# Patient Record
Sex: FEMALE | Race: BLACK OR AFRICAN AMERICAN | Employment: PART TIME | URBAN - METROPOLITAN AREA
[De-identification: names, ages, dates, MRNs, and addresses within clinical notes are randomized per-mention and may not be internally consistent; named-entity substitution may affect disease eponyms.]

---

## 2018-03-06 ENCOUNTER — ANESTHESIA EVENT (OUTPATIENT)
Dept: ENDOSCOPY | Age: 48
End: 2018-03-06
Payer: MEDICARE

## 2018-03-07 ENCOUNTER — ANESTHESIA (OUTPATIENT)
Dept: ENDOSCOPY | Age: 48
End: 2018-03-07
Payer: MEDICARE

## 2018-03-07 ENCOUNTER — HOSPITAL ENCOUNTER (OUTPATIENT)
Age: 48
Setting detail: OUTPATIENT SURGERY
Discharge: HOME | End: 2018-03-07
Attending: INTERNAL MEDICINE | Admitting: INTERNAL MEDICINE
Payer: MEDICARE

## 2018-03-07 VITALS
HEART RATE: 92 BPM | HEIGHT: 69 IN | RESPIRATION RATE: 16 BRPM | BODY MASS INDEX: 36.29 KG/M2 | OXYGEN SATURATION: 91 % | DIASTOLIC BLOOD PRESSURE: 115 MMHG | TEMPERATURE: 98.3 F | SYSTOLIC BLOOD PRESSURE: 176 MMHG | WEIGHT: 245 LBS

## 2018-03-07 PROCEDURE — 77030013991 HC SNR POLYP ENDOSC BSC -A: Performed by: INTERNAL MEDICINE

## 2018-03-07 PROCEDURE — 76060000033 HC ANESTHESIA 1 TO 1.5 HR: Performed by: INTERNAL MEDICINE

## 2018-03-07 PROCEDURE — 74011250636 HC RX REV CODE- 250/636: Performed by: ANESTHESIOLOGY

## 2018-03-07 PROCEDURE — 74011250636 HC RX REV CODE- 250/636

## 2018-03-07 PROCEDURE — 88305 TISSUE EXAM BY PATHOLOGIST: CPT | Performed by: INTERNAL MEDICINE

## 2018-03-07 PROCEDURE — 76040000026: Performed by: INTERNAL MEDICINE

## 2018-03-07 RX ORDER — PROPOFOL 10 MG/ML
INJECTION, EMULSION INTRAVENOUS AS NEEDED
Status: DISCONTINUED | OUTPATIENT
Start: 2018-03-07 | End: 2018-03-07 | Stop reason: HOSPADM

## 2018-03-07 RX ORDER — SODIUM CHLORIDE, SODIUM LACTATE, POTASSIUM CHLORIDE, CALCIUM CHLORIDE 600; 310; 30; 20 MG/100ML; MG/100ML; MG/100ML; MG/100ML
100 INJECTION, SOLUTION INTRAVENOUS CONTINUOUS
Status: DISCONTINUED | OUTPATIENT
Start: 2018-03-07 | End: 2018-03-07 | Stop reason: HOSPADM

## 2018-03-07 RX ORDER — PROPOFOL 10 MG/ML
INJECTION, EMULSION INTRAVENOUS
Status: DISCONTINUED | OUTPATIENT
Start: 2018-03-07 | End: 2018-03-07 | Stop reason: HOSPADM

## 2018-03-07 RX ADMIN — PROPOFOL 160 MCG/KG/MIN: 10 INJECTION, EMULSION INTRAVENOUS at 10:37

## 2018-03-07 RX ADMIN — PROPOFOL 50 MG: 10 INJECTION, EMULSION INTRAVENOUS at 11:02

## 2018-03-07 RX ADMIN — PROPOFOL 50 MG: 10 INJECTION, EMULSION INTRAVENOUS at 10:53

## 2018-03-07 RX ADMIN — PROPOFOL 50 MG: 10 INJECTION, EMULSION INTRAVENOUS at 10:39

## 2018-03-07 RX ADMIN — PROPOFOL 50 MG: 10 INJECTION, EMULSION INTRAVENOUS at 10:47

## 2018-03-07 RX ADMIN — PROPOFOL 50 MG: 10 INJECTION, EMULSION INTRAVENOUS at 11:14

## 2018-03-07 RX ADMIN — PROPOFOL 50 MG: 10 INJECTION, EMULSION INTRAVENOUS at 10:56

## 2018-03-07 RX ADMIN — PROPOFOL 50 MG: 10 INJECTION, EMULSION INTRAVENOUS at 11:00

## 2018-03-07 RX ADMIN — PROPOFOL 50 MG: 10 INJECTION, EMULSION INTRAVENOUS at 10:50

## 2018-03-07 RX ADMIN — PROPOFOL 50 MG: 10 INJECTION, EMULSION INTRAVENOUS at 10:40

## 2018-03-07 RX ADMIN — PROPOFOL 50 MG: 10 INJECTION, EMULSION INTRAVENOUS at 10:58

## 2018-03-07 RX ADMIN — PROPOFOL 50 MG: 10 INJECTION, EMULSION INTRAVENOUS at 11:09

## 2018-03-07 RX ADMIN — SODIUM CHLORIDE, SODIUM LACTATE, POTASSIUM CHLORIDE, AND CALCIUM CHLORIDE 100 ML/HR: 600; 310; 30; 20 INJECTION, SOLUTION INTRAVENOUS at 09:15

## 2018-03-07 RX ADMIN — PROPOFOL 50 MG: 10 INJECTION, EMULSION INTRAVENOUS at 11:16

## 2018-03-07 RX ADMIN — PROPOFOL 50 MG: 10 INJECTION, EMULSION INTRAVENOUS at 11:12

## 2018-03-07 RX ADMIN — PROPOFOL 50 MG: 10 INJECTION, EMULSION INTRAVENOUS at 11:22

## 2018-03-07 RX ADMIN — PROPOFOL 50 MG: 10 INJECTION, EMULSION INTRAVENOUS at 11:04

## 2018-03-07 RX ADMIN — PROPOFOL 50 MG: 10 INJECTION, EMULSION INTRAVENOUS at 11:07

## 2018-03-07 RX ADMIN — PROPOFOL 50 MG: 10 INJECTION, EMULSION INTRAVENOUS at 11:20

## 2018-03-07 RX ADMIN — PROPOFOL 50 MG: 10 INJECTION, EMULSION INTRAVENOUS at 10:37

## 2018-03-07 RX ADMIN — PROPOFOL 50 MG: 10 INJECTION, EMULSION INTRAVENOUS at 11:18

## 2018-03-07 RX ADMIN — PROPOFOL 50 MG: 10 INJECTION, EMULSION INTRAVENOUS at 10:41

## 2018-03-07 RX ADMIN — PROPOFOL 50 MG: 10 INJECTION, EMULSION INTRAVENOUS at 10:45

## 2018-03-07 NOTE — ROUTINE PROCESS
Discharge instructions given to pt and family. Awaiting the doctor to speak with the patient so she can be discharged home. Pt will be taken to car via wheelchair once MD speaks with patient.

## 2018-03-07 NOTE — ANESTHESIA PREPROCEDURE EVALUATION
Anesthetic History   No history of anesthetic complications            Review of Systems / Medical History  Patient summary reviewed and pertinent labs reviewed    Pulmonary        Sleep apnea: No treatment           Neuro/Psych   Within defined limits           Cardiovascular    Hypertension: well controlled              Exercise tolerance: >4 METS     GI/Hepatic/Renal     GERD: well controlled           Endo/Other      Hypothyroidism: well controlled  Obesity     Other Findings            Physical Exam    Airway  Mallampati: II  TM Distance: 4 - 6 cm  Neck ROM: normal range of motion        Cardiovascular  Regular rate and rhythm,  S1 and S2 normal,  no murmur, click, rub, or gallop             Dental         Pulmonary  Breath sounds clear to auscultation               Abdominal  GI exam deferred       Other Findings            Anesthetic Plan    ASA: 3  Anesthesia type: total IV anesthesia          Induction: Intravenous  Anesthetic plan and risks discussed with: Patient and Family      Patient is very difficult to sedate. Will probability need general anesthesia with subsequent GI procedures.

## 2018-03-07 NOTE — PROGRESS NOTES
Patient passing flatus, tolerating po fluids well. Patient escorted to car via wheelchair  by Claudia  for discharge home with sister. Discharge instructions reviewed. Dr. Campos Chavez spoke with pt and family.

## 2018-03-07 NOTE — PROCEDURES
COLONOSCOPY    DATE of PROCEDURE: 3/7/2018    INDICATION:  1. Screening  2. MARK    POSTPROCEDURE DIAGNOSIS:  1. Colon polyps  2. Diverticulosis colon  3. Internal hemorrhoids    MEDICATION:   MAC. Further details per anesthesia note. INSTRUMENT: [id number]    PROCEDURE: After obtaining informed consent, the patient was placed in the left lateral position and sedated. The endoscope was advanced to the cecum where the appendiceal orifice and ileocecal valve were identified. Terminal ileum was not entered. . On withdrawal, the colon was carefully visualized in a 360 degree fashion. The patient was taken to the recovery area in stable condition. Good bowel prep    FINDINGS:  Rectum: Small internal hemorrhoids. Otherwise normal exam.  Sigmoid: Mild diverticulosis. Otherwise normal exam.  Descending Colon: Single sessile polyp <5 mm in size. Cold-snare polypectomy. Otherwise normal exam.  Transverse Colon: Single sessile polyp <5 mm in size. Cold-snare polypectomy. Otherwise normal exam.  Ascending Colon: normal  Cecum: normal  Terminal ileum: not entered    Estimated blood loss: 0-minimal   Specimens obtained during procedure:   1. Transverse colon polyp  2. Ascending colon polyp      PLAN:  1. Follow-up pathology  2. High fiber diet  3. Repeat CBC in 4 weeks  4.  Repeat exam based on pathology    Lind Severance, MD

## 2018-03-07 NOTE — IP AVS SNAPSHOT
Yara Eller 
 
 
 [address] [facility] [facility] [address] [facility] 
[phone] Patient: Christian Gifford 
MRN: [id number] EIN:[birth year] About your hospitalization You were admitted on:  March 7, 2018 You last received care in the:  [facility] You were discharged on:  March 7, 2018 Why you were hospitalized Your primary diagnosis was:  Not on File Follow-up Information None Discharge Orders None A check chelsea indicates which time of day the medication should be taken. My Medications ASK your doctor about these medications Instructions Each Dose to Equal  
 Morning Noon Evening Bedtime AMBIEN 10 mg tablet Generic drug:  zolpidem Your last dose was: Your next dose is: Take  by mouth nightly. amitriptyline 150 mg tablet Commonly known as:  ELAVIL Your last dose was: Your next dose is: Take 150 mg by mouth nightly. 150 mg  
    
   
   
   
  
 dilTIAZem 120 mg SR capsule Commonly known as:  [facility] Your last dose was: Your next dose is: Take 120 mg by mouth daily. 120 mg NORCO  mg tablet Generic drug:  HYDROcodone-acetaminophen Your last dose was: Your next dose is: Take 1 Tab by mouth four (4) times daily. 1 Tab  
    
   
   
   
  
 omeprazole 20 mg capsule Commonly known as:  PRILOSEC Your last dose was: Your next dose is: Take 40 mg by mouth two (2) times a day. 40 mg  
    
   
   
   
  
 SEROquel 300 mg tablet Generic drug:  QUEtiapine Your last dose was: Your next dose is: Take 300 mg by mouth two (2) times a day. Takes both in evenings 300 mg  
    
   
   
   
  
 XANAX 2 mg tablet Generic drug:  ALPRAZolam  
   
Your last dose was: Your next dose is: Take 2 mg by mouth three (3) times daily as needed for Anxiety. 2 mg Discharge Instructions Gastrointestinal Colonoscopy/Flexible Sigmoidoscopy - Lower Exam Discharge Instructions 1. Call Dr. Mays Comes at his office for any problems or questions. 2. Contact the doctors office for follow up appointment as directed 3. Medication may cause drowsiness for several hours, therefore, do not drive or operate machinery for remainder of the day. 4. No alcohol today. 5. Ordinarily, you may resume regular diet and activity after exam unless otherwise specified by your physician. 6. Because of air put into your colon during exam, you may experience some abdominal distension, relieved by the passage of gas, for several hours. 7. Contact your physician if you have any of the following: 
a. Excessive amount of bleeding  large amount when having a bowel movement. Occasional specks of blood with bowel movement would not be unusual. 
b. Severe abdominal pain 
c. Fever or Chills 8. Polyp Removal  follow these additional instructions 
a. Clear liquid diet for the next meal (jell-o, broth, clear drinks) b. Soft diet for 24 hours, then resume regular diet  
c. Take Metamucil  1 tablespoon in juice every morning for 3 days 
d. No Aspirin, Advil, Aleve, Nuprin, Ibuprofen, or medications that contain these drugs for 2 weeks. Any additional instructions: Instructions given to Fidencio Bc and other family members. Instructions given by:  Brenda Pitt RN Gastrointestinal Esophagogastroduodenoscopy (EGD) - Upper Exam Discharge Instructions 1. Call Dr. Mays Comes at his office for any problems or questions. 2. Contact the doctor's office for follow up appointment as directed. 3. Medication may cause drowsiness for several hours, therefore, do not drive or  operate machinery for remainder of the day. 4. No alcohol today. 5. Ordinarily, you may resume regular diet and activity after exam unless otherwise specified by your physician. 6. For mild soreness in your throat you may use Cepacol throat lozenges or warm salt-water gargles as needed. Any additional instructions: Instructions given to Ezra Luphilly and other family members. Instructions given by:  Dangelo Nettles RN Gastrointestinal Colonoscopy/Flexible Sigmoidoscopy - Lower Exam Discharge Instructions 9. Call Dr. Leonor Johnson at his office for any problems or questions. 10. Contact the doctors office for follow up appointment as directed 11. Medication may cause drowsiness for several hours, therefore, do not drive or operate machinery for remainder of the day. 12. No alcohol today. 15. Ordinarily, you may resume regular diet and activity after exam unless otherwise specified by your physician. 14. Because of air put into your colon during exam, you may experience some abdominal distension, relieved by the passage of gas, for several hours. 13. Contact your physician if you have any of the following: 
a. Excessive amount of bleeding  large amount when having a bowel movement. Occasional specks of blood with bowel movement would not be unusual. 
b. Severe abdominal pain 
c. Fever or Chills 16. Polyp Removal  follow these additional instructions 
a. Clear liquid diet for the next meal (jell-o, broth, clear drinks) b. Soft diet for 24 hours, then resume regular diet  
c. Take Metamucil  1 tablespoon in juice every morning for 3 days 
d. No Aspirin, Advil, Aleve, Nuprin, Ibuprofen, or medications that contain these drugs for 2 weeks. Any additional instructions: Instructions given to Ezra Tank and other family members. Instructions given by:  Dangelo Nettles RN Instructions given to Ezra Tank and other family members. Instructions given by:  Dangelo Nettles RN Introducing [facility]! Nita Hancock introduces [employer] patient portal. Now you can access parts of your medical record, email your doctor's office, and request medication refills online. 1. In your internet browser, go to [email or web address] 2. Click on the First Time User? Click Here link in the Sign In box. You will see the New Member Sign Up page. 3. Enter your [employer] Access Code exactly as it appears below. You will not need to use this code after youve completed the sign-up process. If you do not sign up before the expiration date, you must request a new code. · [employer] Access Code: [email or web address] Expires: 6/1/2018 10:05 AM 
 
4. Enter the last four digits of your Social Security Number (xxxx) and Date of Birth (mm/dd/yyyy) as indicated and click Submit. You will be taken to the next sign-up page. 5. Create a [employer] ID. This will be your [employer] login ID and cannot be changed, so think of one that is secure and easy to remember. 6. Create a [employer] password. You can change your password at any time. 7. Enter your Password Reset Question and Answer. This can be used at a later time if you forget your password. 8. Enter your e-mail address. You will receive e-mail notification when new information is available in [address] 9. Click Sign Up. You can now view and download portions of your medical record. 10. Click the Download Summary menu link to download a portable copy of your medical information. If you have questions, please visit the Frequently Asked Questions section of the [employer] website. Remember, [employer] is NOT to be used for urgent needs. For medical emergencies, dial 911. Now available from your iPhone and Android! Providers Seen During Your Hospitalization Provider Specialty Primary office phone Loki Esteban MD Gastroenterology [phone] Your Primary Care Physician (PCP) Primary Care Physician Office Phone Office Fax Rhea Pathak 1420 [phone] You are allergic to the following Allergen Reactions Ace Inhibitors Anaphylaxis Recent Documentation Height Weight Breastfeeding? BMI OB Status Smoking Status 1.753 m 111.1 kg No 36.18 kg/m2 Hysterectomy Former Smoker Emergency Contacts Name Discharge Info Relation Home Work Mobile Ana Prado  Other Relative [6] [phone] Zhanna Hamilton  Other Relative [6] [phone] Sarah Calderón  Child [2] [phone] Patient Belongings The following personal items are in your possession at time of discharge: 
  Dental Appliances: None  Visual Aid: None Please provide this summary of care documentation to your next provider. Signatures-by signing, you are acknowledging that this After Visit Summary has been reviewed with you and you have received a copy. Patient Signature:  ____________________________________________________________ Date:  ____________________________________________________________  
  
[facility] Dandy Provider Signature:  ____________________________________________________________ Date:  ____________________________________________________________

## 2018-03-07 NOTE — DISCHARGE INSTRUCTIONS
Gastrointestinal Colonoscopy/Flexible Sigmoidoscopy - Lower Exam Discharge Instructions  1. Call Dr. Concepcion Chávez at his office for any problems or questions. 2. Contact the doctors office for follow up appointment as directed  3. Medication may cause drowsiness for several hours, therefore, do not drive or operate machinery for remainder of the day. 4. No alcohol today. 5. Ordinarily, you may resume regular diet and activity after exam unless otherwise specified by your physician. 6. Because of air put into your colon during exam, you may experience some abdominal distension, relieved by the passage of gas, for several hours. 7. Contact your physician if you have any of the following:  a. Excessive amount of bleeding - large amount when having a bowel movement. Occasional specks of blood with bowel movement would not be unusual.  b. Severe abdominal pain  c. Fever or Chills  8. Polyp Removal - follow these additional instructions  a. Clear liquid diet for the next meal (jell-o, broth, clear drinks)  b. Soft diet for 24 hours, then resume regular diet   c. Take Metamucil - 1 tablespoon in juice every morning for 3 days  d. No Aspirin, Advil, Aleve, Nuprin, Ibuprofen, or medications that contain these drugs for 2 weeks. Any additional instructions: Instructions given to Ena Kim and other family members. Instructions given by:  Taylor Miller RN Gastrointestinal Esophagogastroduodenoscopy (EGD) - Upper Exam Discharge Instructions    1. Call Dr. Concepcion Chávez at his office for any problems or questions. 2. Contact the doctor's office for follow up appointment as directed. 3. Medication may cause drowsiness for several hours, therefore, do not drive or  operate machinery for remainder of the day. 4. No alcohol today. 5. Ordinarily, you may resume regular diet and activity after exam unless otherwise specified by your physician.   6. For mild soreness in your throat you may use Cepacol throat lozenges or warm salt-water gargles as needed. Any additional instructions: Instructions given to Quirinoyasmani Savi and other family members. Instructions given by:  Marci David RN    Gastrointestinal Colonoscopy/Flexible Sigmoidoscopy - Lower Exam Discharge Instructions  9. Call Dr. Stuart Abdi at his office for any problems or questions. 10. Contact the doctors office for follow up appointment as directed  11. Medication may cause drowsiness for several hours, therefore, do not drive or operate machinery for remainder of the day. 12. No alcohol today. 15. Ordinarily, you may resume regular diet and activity after exam unless otherwise specified by your physician. 14. Because of air put into your colon during exam, you may experience some abdominal distension, relieved by the passage of gas, for several hours. 13. Contact your physician if you have any of the following:  a. Excessive amount of bleeding - large amount when having a bowel movement. Occasional specks of blood with bowel movement would not be unusual.  b. Severe abdominal pain  c. Fever or Chills  16. Polyp Removal - follow these additional instructions  a. Clear liquid diet for the next meal (jell-o, broth, clear drinks)  b. Soft diet for 24 hours, then resume regular diet   c. Take Metamucil - 1 tablespoon in juice every morning for 3 days  d. No Aspirin, Advil, Aleve, Nuprin, Ibuprofen, or medications that contain these drugs for 2 weeks. Any additional instructions: Instructions given to Quirinoaysmani Schulerarlyn and other family members. Instructions given by:  Marci David RN        Instructions given to Stuart Hou and other family members.   Instructions given by:  Marci David RN

## 2018-03-07 NOTE — H&P
Gastroenterology Outpatient History and Physical    Patient: Cheryl Campos    Physician: Elsa Neville MD    Vital Signs: Blood pressure (!) 199/107, pulse 93, temperature 98.3 °F (36.8 °C), resp. rate 16, height 5' 9\" (1.753 m), weight 111.1 kg (245 lb), SpO2 97 %, not currently breastfeeding. Allergies:    Allergies   Allergen Reactions    Ace Inhibitors Anaphylaxis       Chief Complaint: Dysphagia, emesis, GERD, epigastric pain, MARK, Screening colonoscopy    History of Present Illness: As Above    Justification for Procedure: As Above    History:  Past Medical History:   Diagnosis Date    Anxiety     Asthma     none since stopped smoking--5 yrs    Benzodiazepine (tranquilizer) overdose 20 yrs ago    Chronic pain     back, knees    Depression     medication    GERD (gastroesophageal reflux disease)     controlled with medication    Hypertension     controlled with med    Obese     bmi =35    IAN (obstructive sleep apnea)     DOES NOT WEAR CPAP    Panic attacks     S/P gastric bypass 2013    wt loss 60lbs-- gained all back    Schizophrenia ([facility] [address]     Suicide, attempted 20 YRS AGO    Thromboembolus ([facility] [address] [facility] 2010    left leg-- IVF PLACED    Thyroid disease     HYPOTHYROIDISM      Past Surgical History:   Procedure Laterality Date    HX BREAST REDUCTION      HX GASTRECTOMY  3/4/13    Sleeve    HX HYSTERECTOMY      HX OOPHORECTOMY      HX TUBAL LIGATION      HX VASCULAR ACCESS  2010    IVF PLACED      Social History     Social History    Marital status: SINGLE     Spouse name: N/A    Number of children: N/A    Years of education: N/A     Social History Main Topics    Smoking status: Former Smoker     Packs/day: 0.25     Years: 11.00     Quit date: 2/1/2013    Smokeless tobacco: Never Used    Alcohol use 0.0 oz/week      Comment: occ    Drug use: No    Sexual activity: Not Asked     Other Topics Concern    None     Social History Narrative      Family History   Problem Relation Age of Onset    Cancer Father        Medications:   Prior to Admission medications    Medication Sig Start Date End Date Taking? Authorizing Provider   ALPRAZolam Hazeline Ta) 2 mg tablet Take 2 mg by mouth three (3) times daily as needed for Anxiety. Yes Historical Provider   dilTIAZem (TIAZAC) 120 mg SR capsule Take 120 mg by mouth daily. Yes Historical Provider   omeprazole (PRILOSEC) 20 mg capsule Take 40 mg by mouth two (2) times a day. Historical Provider   HYDROcodone-acetaminophen (NORCO)  mg tablet Take 1 Tab by mouth four (4) times daily. Historical Provider   QUEtiapine (SEROQUEL) 300 mg tablet Take 300 mg by mouth two (2) times a day. Takes both in evenings    Historical Provider   amitriptyline (ELAVIL) 150 mg tablet Take 150 mg by mouth nightly. Historical Provider   zolpidem (AMBIEN) 10 mg tablet Take  by mouth nightly.     Historical Provider       Physical Exam:         Heart: regular rate and rhythm, S1, S2 normal, no murmur, click, rub or gallop   Lungs: chest clear, no wheezing, rales, normal symmetric air entry, Heart exam - S1, S2 normal, no murmur, no gallop, rate regular   Abdominal: Bowel sounds are normal, Bowel sounds active, liver is not enlarged, spleen is not enlarged           Findings/Diagnosis: Dysphagia, emesis, GERD, epigastric pain, MARK, Screening colonoscopy    EGD/colonoscopy        Signed:  Nirav Mata MD 3/7/2018

## 2018-03-07 NOTE — ANESTHESIA POSTPROCEDURE EVALUATION
Post-Anesthesia Evaluation and Assessment    Patient: Kayla Watters MRN: [id number]  SSN: [id number]    YOB: 1970  Age: 50 y.o. Sex: female       Cardiovascular Function/Vital Signs  Visit Vitals    BP (!) 140/99    Pulse 92    Temp 36.8 °C (98.3 °F)    Resp 16    Ht 5' 9\" (1.753 m)    Wt 111.1 kg (245 lb)    SpO2 94%    Breastfeeding No    BMI 36.18 kg/m2       Patient is status post total IV anesthesia anesthesia for Procedure(s):  ESOPHAGOGASTRODUODENOSCOPY (EGD)  COLONOSCOPY  BMI 39  ESOPHAGEAL DILATION  ENDOSCOPIC POLYPECTOMY. Nausea/Vomiting: None    Postoperative hydration reviewed and adequate. Pain:  Pain Scale 1: Numeric (0 - 10) (03/07/18 0908)  Pain Intensity 1: 0 (03/07/18 0908)   Managed    Neurological Status: At baseline    Mental Status and Level of Consciousness: Arousable    Pulmonary Status:   O2 Device: Oxygen mask (03/07/18 1134)   Adequate oxygenation and airway patent    Complications related to anesthesia: None    Post-anesthesia assessment completed.  No concerns    Signed By: Peyton Gerber MD     March 7, 2018

## 2018-03-07 NOTE — PROCEDURES
Esophagogastroduodenoscopy    DATE of PROCEDURE: 3/7/2018    INDICATION:  1. Dysphagia  2. MARK  3. GERD  4. Epigastric pain    POSTPROCEDURE DIAGNOSIS:  1. Hiatal hernia    MEDICATIONS ADMINISTERED: MAC. Further details per anesthesia note. INSTRUMENT: [id number]    PROCEDURE:  After obtaining informed consent, the patient was placed in the left lateral position and sedated. The endoscope was advanced under direct vision without difficulty. The esophagus, stomach (including retroflexed views) and duodenum were evaluated. The patient was taken to the recovery area in stable condition. FINDINGS:  ESOPHAGUS: Small hiatal hernia. Otherwise normal exam. Empiric dilation with 60 Fr Longo dilator. Negative heme. STOMACH: Normal.  DUODENUM: Normal    Estimated blood loss: 0-minimal   Specimens obtained during procedure: none    PLAN:  1. Continue omeprazole 40 mg PO BID  2.  Proceed with colonoscopy      Manuel Diego MD

## 2018-07-09 ENCOUNTER — HOSPITAL ENCOUNTER (OUTPATIENT)
Dept: MAMMOGRAPHY | Age: 48
Discharge: HOME | End: 2018-07-09
Attending: INTERNAL MEDICINE
Payer: MEDICARE

## 2018-07-09 DIAGNOSIS — Z12.31 VISIT FOR SCREENING MAMMOGRAM: ICD-10-CM

## 2018-07-09 PROCEDURE — 77067 SCR MAMMO BI INCL CAD: CPT

## 2018-10-23 DIAGNOSIS — J35.01 CHRONIC TONSILLITIS: Primary | ICD-10-CM

## 2018-10-30 ENCOUNTER — HOSPITAL ENCOUNTER (OUTPATIENT)
Dept: SURGERY | Age: 48
Discharge: HOME | End: 2018-10-30

## 2018-11-01 VITALS — WEIGHT: 252 LBS | HEIGHT: 69 IN | BODY MASS INDEX: 37.33 KG/M2

## 2018-11-01 NOTE — PERIOP NOTES
Patient verified name and . Order for consent was found in EHR and matches case posting; patient verifies procedure. Type 1B surgery, PAT phone assessment complete. Orders were received. Labs per surgeon: none  Labs per anesthesia protocol: Hgb- Pt instructed to come for lab work entrance B (Monday- Friday 8:30 AM- 4:00 PM) prior to surgery day. Pt voiced an understanding. Patient answered medical/surgical history questions at their best of ability. All prior to admission medications documented in [facility]. Patient instructed to take the following medications the day of surgery according to anesthesia guidelines with a small sip of water: Norco, tiazac, prilosec, seroquel and xanax if needed. Hold all vitamins 7 days prior to surgery and NSAIDS 5 days prior to surgery. Medications to be held- none. Patient instructed on the following:  Arrive at A Entrance, time of arrival to be called the day before by 1700  NPO after midnight including gum, mints, and ice chips  Responsible adult must drive patient to the hospital, stay during surgery, and patient will need supervision 24 hours after anesthesia  Use antibacterial soap in shower the night before surgery and on the morning of surgery  All piercings must be removed prior to arrival.    Leave all valuables (money and jewelry) at home but bring insurance card and ID on       DOS. Do not wear make-up, nail polish, lotions, cologne, perfumes, powders, or oil on skin. Patient teach back successful and patient demonstrates knowledge of instruction.

## 2018-11-02 ENCOUNTER — HOSPITAL ENCOUNTER (OUTPATIENT)
Dept: LAB | Age: 48
Discharge: HOME | End: 2018-11-02
Attending: OTOLARYNGOLOGY
Payer: MEDICARE

## 2018-11-02 LAB — HGB BLD-MCNC: 9.8 G/DL (ref 11.7–15.4)

## 2018-11-02 PROCEDURE — 36415 COLL VENOUS BLD VENIPUNCTURE: CPT

## 2018-11-02 PROCEDURE — 85018 HEMOGLOBIN: CPT

## 2018-11-02 NOTE — PERIOP NOTES
HGB done today within anesthesia protocols. Voice mail left for [facility] @ Dr. Rajesh Higuera office with HGB result. Recent Results (from the past 12 hour(s)) HEMOGLOBIN Collection Time: 11/02/18  1:15 PM  
Result Value Ref Range HGB 9.8 (L) 11.7 - 15.4 g/dL

## 2018-11-04 RX ORDER — NALOXONE HYDROCHLORIDE 0.4 MG/ML
0.1 INJECTION, SOLUTION INTRAMUSCULAR; INTRAVENOUS; SUBCUTANEOUS AS NEEDED
Status: CANCELLED | OUTPATIENT
Start: 2018-11-04 | End: 2018-11-04

## 2018-11-04 RX ORDER — ONDANSETRON 2 MG/ML
4 INJECTION INTRAMUSCULAR; INTRAVENOUS ONCE
Status: CANCELLED | OUTPATIENT
Start: 2018-11-04 | End: 2018-11-04

## 2018-11-04 RX ORDER — OXYCODONE HYDROCHLORIDE 5 MG/1
10 TABLET ORAL
Status: CANCELLED | OUTPATIENT
Start: 2018-11-04 | End: 2018-11-05

## 2018-11-04 RX ORDER — HYDROMORPHONE HYDROCHLORIDE 2 MG/ML
0.5 INJECTION, SOLUTION INTRAMUSCULAR; INTRAVENOUS; SUBCUTANEOUS
Status: CANCELLED | OUTPATIENT
Start: 2018-11-04

## 2018-11-04 RX ORDER — DIPHENHYDRAMINE HYDROCHLORIDE 50 MG/ML
12.5 INJECTION, SOLUTION INTRAMUSCULAR; INTRAVENOUS ONCE
Status: CANCELLED | OUTPATIENT
Start: 2018-11-04 | End: 2018-11-04

## 2018-11-04 RX ORDER — ACETAMINOPHEN 500 MG
500 TABLET ORAL ONCE
Status: CANCELLED | OUTPATIENT
Start: 2018-11-04 | End: 2018-11-04

## 2018-11-04 RX ORDER — SODIUM CHLORIDE, SODIUM LACTATE, POTASSIUM CHLORIDE, CALCIUM CHLORIDE 600; 310; 30; 20 MG/100ML; MG/100ML; MG/100ML; MG/100ML
75 INJECTION, SOLUTION INTRAVENOUS CONTINUOUS
Status: CANCELLED | OUTPATIENT
Start: 2018-11-04

## 2018-11-04 RX ORDER — SODIUM CHLORIDE 0.9 % (FLUSH) 0.9 %
5-10 SYRINGE (ML) INJECTION AS NEEDED
Status: CANCELLED | OUTPATIENT
Start: 2018-11-04

## 2018-11-04 RX ORDER — OXYCODONE HYDROCHLORIDE 5 MG/1
5 TABLET ORAL
Status: CANCELLED | OUTPATIENT
Start: 2018-11-04 | End: 2018-11-05

## 2018-11-05 ENCOUNTER — HOSPITAL ENCOUNTER (OUTPATIENT)
Age: 48
Setting detail: OUTPATIENT SURGERY
Discharge: HOME | End: 2018-11-05
Attending: OTOLARYNGOLOGY | Admitting: OTOLARYNGOLOGY

## 2018-11-05 VITALS
SYSTOLIC BLOOD PRESSURE: 195 MMHG | RESPIRATION RATE: 16 BRPM | OXYGEN SATURATION: 97 % | DIASTOLIC BLOOD PRESSURE: 129 MMHG | TEMPERATURE: 97.8 F | HEART RATE: 99 BPM

## 2018-11-05 DIAGNOSIS — J35.01 CHRONIC TONSILLITIS: ICD-10-CM

## 2018-11-05 RX ORDER — MIDAZOLAM HYDROCHLORIDE 1 MG/ML
2 INJECTION, SOLUTION INTRAMUSCULAR; INTRAVENOUS
Status: DISCONTINUED | OUTPATIENT
Start: 2018-11-05 | End: 2018-11-05 | Stop reason: HOSPADM

## 2018-11-05 RX ORDER — LIDOCAINE HYDROCHLORIDE 10 MG/ML
0.1 INJECTION INFILTRATION; PERINEURAL AS NEEDED
Status: DISCONTINUED | OUTPATIENT
Start: 2018-11-05 | End: 2018-11-05 | Stop reason: HOSPADM

## 2018-11-05 RX ORDER — SODIUM CHLORIDE 0.9 % (FLUSH) 0.9 %
5-10 SYRINGE (ML) INJECTION EVERY 8 HOURS
Status: DISCONTINUED | OUTPATIENT
Start: 2018-11-05 | End: 2018-11-05 | Stop reason: HOSPADM

## 2018-11-05 RX ORDER — FENTANYL CITRATE 50 UG/ML
100 INJECTION, SOLUTION INTRAMUSCULAR; INTRAVENOUS AS NEEDED
Status: DISCONTINUED | OUTPATIENT
Start: 2018-11-05 | End: 2018-11-05 | Stop reason: HOSPADM

## 2018-11-05 RX ORDER — SODIUM CHLORIDE 0.9 % (FLUSH) 0.9 %
5-10 SYRINGE (ML) INJECTION AS NEEDED
Status: DISCONTINUED | OUTPATIENT
Start: 2018-11-05 | End: 2018-11-05 | Stop reason: HOSPADM

## 2018-11-05 RX ORDER — SODIUM CHLORIDE, SODIUM LACTATE, POTASSIUM CHLORIDE, CALCIUM CHLORIDE 600; 310; 30; 20 MG/100ML; MG/100ML; MG/100ML; MG/100ML
1000 INJECTION, SOLUTION INTRAVENOUS CONTINUOUS
Status: DISCONTINUED | OUTPATIENT
Start: 2018-11-05 | End: 2018-11-05 | Stop reason: HOSPADM

## 2018-11-05 NOTE — PROGRESS NOTES
Spiritual Care Visit, initial visit. Surgery was cancelled. Visit by Jorge Luis Scott, Staff .  Natalie., Nelson.JUN., B.A.

## 2018-12-03 DIAGNOSIS — J35.01 CHRONIC TONSILLITIS: Primary | ICD-10-CM

## 2018-12-04 ENCOUNTER — HOSPITAL ENCOUNTER (OUTPATIENT)
Dept: SURGERY | Age: 48
Discharge: HOME | End: 2018-12-04

## 2018-12-04 ENCOUNTER — HOSPITAL ENCOUNTER (OUTPATIENT)
Dept: LAB | Age: 48
Discharge: HOME | End: 2018-12-04
Attending: OTOLARYNGOLOGY

## 2018-12-04 VITALS — WEIGHT: 244 LBS | HEIGHT: 69 IN | BODY MASS INDEX: 36.14 KG/M2

## 2018-12-04 LAB — HGB BLD-MCNC: 9.7 G/DL (ref 11.7–15.4)

## 2018-12-04 RX ORDER — HYDROCODONE BITARTRATE AND ACETAMINOPHEN 10; 325 MG/1; MG/1
1 TABLET ORAL
COMMUNITY
End: 2020-02-29

## 2018-12-04 NOTE — PERIOP NOTES
Patient verified name and . Order for consent found in EHR and matches case posting; patient verifies procedure. Type 1B surgery, phone assessment complete. Orders received. Labs per surgeon: none needed. Labs per anesthesia protocol: HGB. Coming to the outpatient lab. Patient originally was scheduled for 18. Patient came in that morning for surgery but surgery was cancelled due to elevated BP. PCP (Dr. Colette Multani) office called and requested most recent office note. [phone])      Patient answered medical/surgical history questions at their best of ability. All prior to admission medications documented in [facility]. Patient instructed to take the following medications the day of surgery according to anesthesia guidelines with a small sip of water: xanax if needed, diltiazem, norco if needed, omeprazole. Hold all vitamins 7 days prior to surgery and NSAIDS 5 days prior to surgery. Medications to be held: none. Patient instructed on the following:  Arrive at A Entrance, time of arrival to be called the day before by 1700  NPO after midnight including gum, mints, and ice chips  Responsible adult must drive patient to the hospital, stay during surgery, and patient will need supervision 24 hours after anesthesia  Use dial antibacterial soap in shower 3 nights before surgery and on the morning of surgery  All piercings must be removed prior to arrival.    Leave all valuables (money and jewelry) at home but bring insurance card and ID on DOS. Do not wear make-up, nail polish, lotions, cologne, perfumes, powders, or oil on skin. Patient teach back successful and patient demonstrates knowledge of instruction.

## 2018-12-04 NOTE — PERIOP NOTES
Hemoglobin 9.7 ~results routed via fax to PCP, Dr Andrea Kennedy and to surgeon, Dr. Bentley Tinsley, per protocol/review. Attempt to call Dr Yvan Farrar office to make his nurse be aware of hemoglobin 9.7; office is already closed for the day.

## 2018-12-04 NOTE — PERIOP NOTES
Recent Results (from the past 8 hour(s))   HEMOGLOBIN    Collection Time: 12/04/18  4:10 PM   Result Value Ref Range    HGB 9.7 (L) 11.7 - 15.4 g/dL

## 2018-12-05 NOTE — PERIOP NOTES
Called Dr. Juancarlos Prieto office. Left detailed voicemail message for [place], [place]. Reported pt's hgb 9.7.

## 2018-12-09 ENCOUNTER — ANESTHESIA EVENT (OUTPATIENT)
Dept: SURGERY | Age: 48
End: 2018-12-09
Payer: MEDICARE

## 2018-12-09 RX ORDER — SODIUM CHLORIDE 0.9 % (FLUSH) 0.9 %
5-10 SYRINGE (ML) INJECTION EVERY 8 HOURS
Status: CANCELLED | OUTPATIENT
Start: 2018-12-09

## 2018-12-09 RX ORDER — SODIUM CHLORIDE 0.9 % (FLUSH) 0.9 %
5-10 SYRINGE (ML) INJECTION AS NEEDED
Status: CANCELLED | OUTPATIENT
Start: 2018-12-09

## 2018-12-09 RX ORDER — OXYCODONE HYDROCHLORIDE 5 MG/1
5 TABLET ORAL
Status: CANCELLED | OUTPATIENT
Start: 2018-12-09 | End: 2018-12-10

## 2018-12-09 RX ORDER — HYDROMORPHONE HYDROCHLORIDE 2 MG/ML
0.5 INJECTION, SOLUTION INTRAMUSCULAR; INTRAVENOUS; SUBCUTANEOUS
Status: CANCELLED | OUTPATIENT
Start: 2018-12-09

## 2018-12-09 RX ORDER — NALOXONE HYDROCHLORIDE 0.4 MG/ML
0.1 INJECTION, SOLUTION INTRAMUSCULAR; INTRAVENOUS; SUBCUTANEOUS AS NEEDED
Status: CANCELLED | OUTPATIENT
Start: 2018-12-09

## 2018-12-09 RX ORDER — HYDROCODONE BITARTRATE AND ACETAMINOPHEN 7.5; 325 MG/1; MG/1
1 TABLET ORAL AS NEEDED
Status: CANCELLED | OUTPATIENT
Start: 2018-12-09

## 2018-12-10 ENCOUNTER — HOSPITAL ENCOUNTER (OUTPATIENT)
Age: 48
Setting detail: OUTPATIENT SURGERY
Discharge: HOME | End: 2018-12-10
Attending: OTOLARYNGOLOGY | Admitting: OTOLARYNGOLOGY
Payer: MEDICARE

## 2018-12-10 ENCOUNTER — ANESTHESIA (OUTPATIENT)
Dept: SURGERY | Age: 48
End: 2018-12-10
Payer: MEDICARE

## 2018-12-10 VITALS
TEMPERATURE: 97.2 F | RESPIRATION RATE: 16 BRPM | SYSTOLIC BLOOD PRESSURE: 161 MMHG | HEIGHT: 69 IN | HEART RATE: 87 BPM | WEIGHT: 264.38 LBS | OXYGEN SATURATION: 95 % | BODY MASS INDEX: 39.16 KG/M2 | DIASTOLIC BLOOD PRESSURE: 87 MMHG

## 2018-12-10 DIAGNOSIS — J35.01 CHRONIC TONSILLITIS: ICD-10-CM

## 2018-12-10 PROCEDURE — 77030012840 HC ELECTRD COAG SUC CNMD -C: Performed by: OTOLARYNGOLOGY

## 2018-12-10 PROCEDURE — 77030008703 HC TU ET UNCUF COVD -A: Performed by: NURSE ANESTHETIST, CERTIFIED REGISTERED

## 2018-12-10 PROCEDURE — 74011000250 HC RX REV CODE- 250

## 2018-12-10 PROCEDURE — 74011250636 HC RX REV CODE- 250/636

## 2018-12-10 PROCEDURE — 77030020782 HC GWN BAIR PAWS FLX 3M -B: Performed by: NURSE ANESTHETIST, CERTIFIED REGISTERED

## 2018-12-10 PROCEDURE — 74011250636 HC RX REV CODE- 250/636: Performed by: ANESTHESIOLOGY

## 2018-12-10 PROCEDURE — 77030039425 HC BLD LARYNG TRULITE DISP TELE -A: Performed by: NURSE ANESTHETIST, CERTIFIED REGISTERED

## 2018-12-10 PROCEDURE — 76060000032 HC ANESTHESIA 0.5 TO 1 HR: Performed by: OTOLARYNGOLOGY

## 2018-12-10 PROCEDURE — 77030011267 HC ELECTRD BLD COVD -A: Performed by: OTOLARYNGOLOGY

## 2018-12-10 PROCEDURE — 76210000026 HC REC RM PH II 1 TO 1.5 HR: Performed by: OTOLARYNGOLOGY

## 2018-12-10 PROCEDURE — 76010000138 HC OR TIME 0.5 TO 1 HR: Performed by: OTOLARYNGOLOGY

## 2018-12-10 PROCEDURE — 77030008477 HC STYL SATN SLP COVD -A: Performed by: NURSE ANESTHETIST, CERTIFIED REGISTERED

## 2018-12-10 PROCEDURE — 76210000017 HC OR PH I REC 1.5 TO 2 HR: Performed by: OTOLARYNGOLOGY

## 2018-12-10 PROCEDURE — 77030018836 HC SOL IRR NACL ICUM -A: Performed by: OTOLARYNGOLOGY

## 2018-12-10 RX ORDER — LIDOCAINE HYDROCHLORIDE 10 MG/ML
0.1 INJECTION INFILTRATION; PERINEURAL AS NEEDED
Status: DISCONTINUED | OUTPATIENT
Start: 2018-12-10 | End: 2018-12-10 | Stop reason: HOSPADM

## 2018-12-10 RX ORDER — ROCURONIUM BROMIDE 10 MG/ML
INJECTION, SOLUTION INTRAVENOUS AS NEEDED
Status: DISCONTINUED | OUTPATIENT
Start: 2018-12-10 | End: 2018-12-10 | Stop reason: HOSPADM

## 2018-12-10 RX ORDER — MIDAZOLAM HYDROCHLORIDE 1 MG/ML
2 INJECTION, SOLUTION INTRAMUSCULAR; INTRAVENOUS
Status: DISCONTINUED | OUTPATIENT
Start: 2018-12-10 | End: 2018-12-10 | Stop reason: HOSPADM

## 2018-12-10 RX ORDER — DEXAMETHASONE SODIUM PHOSPHATE 4 MG/ML
INJECTION, SOLUTION INTRA-ARTICULAR; INTRALESIONAL; INTRAMUSCULAR; INTRAVENOUS; SOFT TISSUE AS NEEDED
Status: DISCONTINUED | OUTPATIENT
Start: 2018-12-10 | End: 2018-12-10 | Stop reason: HOSPADM

## 2018-12-10 RX ORDER — SUCCINYLCHOLINE CHLORIDE 20 MG/ML
INJECTION INTRAMUSCULAR; INTRAVENOUS AS NEEDED
Status: DISCONTINUED | OUTPATIENT
Start: 2018-12-10 | End: 2018-12-10 | Stop reason: HOSPADM

## 2018-12-10 RX ORDER — LIDOCAINE HYDROCHLORIDE 20 MG/ML
INJECTION, SOLUTION EPIDURAL; INFILTRATION; INTRACAUDAL; PERINEURAL AS NEEDED
Status: DISCONTINUED | OUTPATIENT
Start: 2018-12-10 | End: 2018-12-10 | Stop reason: HOSPADM

## 2018-12-10 RX ORDER — FAMOTIDINE 20 MG/1
20 TABLET, FILM COATED ORAL ONCE
Status: DISCONTINUED | OUTPATIENT
Start: 2018-12-10 | End: 2018-12-10 | Stop reason: HOSPADM

## 2018-12-10 RX ORDER — ONDANSETRON 2 MG/ML
INJECTION INTRAMUSCULAR; INTRAVENOUS AS NEEDED
Status: DISCONTINUED | OUTPATIENT
Start: 2018-12-10 | End: 2018-12-10 | Stop reason: HOSPADM

## 2018-12-10 RX ORDER — SODIUM CHLORIDE, SODIUM LACTATE, POTASSIUM CHLORIDE, CALCIUM CHLORIDE 600; 310; 30; 20 MG/100ML; MG/100ML; MG/100ML; MG/100ML
100 INJECTION, SOLUTION INTRAVENOUS CONTINUOUS
Status: DISCONTINUED | OUTPATIENT
Start: 2018-12-10 | End: 2018-12-10 | Stop reason: HOSPADM

## 2018-12-10 RX ORDER — SODIUM CHLORIDE 9 MG/ML
25 INJECTION, SOLUTION INTRAVENOUS CONTINUOUS
Status: DISCONTINUED | OUTPATIENT
Start: 2018-12-10 | End: 2018-12-10 | Stop reason: HOSPADM

## 2018-12-10 RX ORDER — PROPOFOL 10 MG/ML
INJECTION, EMULSION INTRAVENOUS AS NEEDED
Status: DISCONTINUED | OUTPATIENT
Start: 2018-12-10 | End: 2018-12-10 | Stop reason: HOSPADM

## 2018-12-10 RX ORDER — FENTANYL CITRATE 50 UG/ML
100 INJECTION, SOLUTION INTRAMUSCULAR; INTRAVENOUS ONCE
Status: DISCONTINUED | OUTPATIENT
Start: 2018-12-10 | End: 2018-12-10 | Stop reason: HOSPADM

## 2018-12-10 RX ADMIN — SODIUM CHLORIDE, SODIUM LACTATE, POTASSIUM CHLORIDE, AND CALCIUM CHLORIDE: 600; 310; 30; 20 INJECTION, SOLUTION INTRAVENOUS at 14:10

## 2018-12-10 RX ADMIN — DEXAMETHASONE SODIUM PHOSPHATE 10 MG: 4 INJECTION, SOLUTION INTRA-ARTICULAR; INTRALESIONAL; INTRAMUSCULAR; INTRAVENOUS; SOFT TISSUE at 14:25

## 2018-12-10 RX ADMIN — PROPOFOL 200 MG: 10 INJECTION, EMULSION INTRAVENOUS at 14:18

## 2018-12-10 RX ADMIN — ROCURONIUM BROMIDE 10 MG: 10 INJECTION, SOLUTION INTRAVENOUS at 14:18

## 2018-12-10 RX ADMIN — ONDANSETRON 4 MG: 2 INJECTION INTRAMUSCULAR; INTRAVENOUS at 14:25

## 2018-12-10 RX ADMIN — SUCCINYLCHOLINE CHLORIDE 140 MG: 20 INJECTION INTRAMUSCULAR; INTRAVENOUS at 14:18

## 2018-12-10 RX ADMIN — LIDOCAINE HYDROCHLORIDE 60 MG: 20 INJECTION, SOLUTION EPIDURAL; INFILTRATION; INTRACAUDAL; PERINEURAL at 14:18

## 2018-12-10 NOTE — ANESTHESIA PREPROCEDURE EVALUATION
Anesthetic History               Review of Systems / Medical History  Patient summary reviewed    Pulmonary        Sleep apnea: No treatment  Smoker (Former)  Asthma        Neuro/Psych     seizures    Psychiatric history (Depression, Schizophrenia      )     Cardiovascular    Hypertension                Comments:  Thromboembolus-IVC filter   GI/Hepatic/Renal     GERD          Comments: S/P gastric bypass Endo/Other      Hypothyroidism  Obesity     Other Findings              Physical Exam    Airway  Mallampati: III  TM Distance: > 6 cm  Neck ROM: decreased range of motion   Mouth opening: Normal     Cardiovascular  Regular rate and rhythm,  S1 and S2 normal,  no murmur, click, rub, or gallop             Dental         Pulmonary  Breath sounds clear to auscultation               Abdominal         Other Findings            Anesthetic Plan    ASA: 3  Anesthesia type: general            Anesthetic plan and risks discussed with: Patient and Spouse      Patient somewhat lethargic, easily arouseable,  states she took her Xanax prior to coming to hospital, makes hersleepy

## 2018-12-10 NOTE — DISCHARGE INSTRUCTIONS
Tonsillectomy: What to Expect at Home  Your Recovery  A tonsillectomy is surgery to remove the tonsils. Sometimes the adenoids are removed during the same surgery. The tonsils and adenoids are in the throat. Your doctor did the surgery through your mouth. Most adults have a lot of throat pain for 1 to 2 weeks or longer. The pain may get worse before it gets better. The pain in your throat can also make your ears hurt. You may have good days and bad days. Most people find that they have the most pain in the first 8 days. You probably will feel tired for 1 to 2 weeks. You may have bad breath for up to 2 weeks. You may be able to go back to work or your usual routine in 1 to 2 weeks. There will be a white coating in your throat where the tonsils were. The coating is like a scab, and it usually starts to come off in 5 to 10 days. It is usually gone in 10 to 16 days. You may see some blood in your spit as the coating comes off. After surgery, you may snore or breathe through your mouth at night. This usually gets better 1 to 2 weeks after surgery. Mouth breathing can make your mouth and throat dry or sore. Place a humidifier by your bed when you sleep. This may make it easier for you to breathe. Follow the directions for cleaning the machine. At first, your voice may sound different. Your voice probably will return to normal in 2 to 6 weeks. It is common for people to lose weight after this surgery, because it can hurt to swallow food at first. As long as you are drinking plenty of liquids, this is okay. You will probably gain the weight back when you begin to eat normally again. This care sheet gives you a general idea about how long it will take for you to recover. But each person recovers at a different pace. Follow the steps below to get better as quickly as possible. How can you care for yourself at home? Activity    · Rest when you feel tired.  Getting enough sleep will help you recover.     · Try to walk each day. Start by walking a little more than you did the day before. Bit by bit, increase the amount you walk. Walking boosts blood flow and helps prevent pneumonia and constipation.     · Avoid strenuous activities, such as bicycle riding, jogging, weight lifting, or aerobic exercise, for 2 weeks or until your doctor says it is okay.     · For 2 weeks, avoid lifting anything that would make you strain. This may include a child, heavy grocery bags and milk containers, a heavy briefcase or backpack, cat litter or dog food bags, or a vacuum .     · Avoid dirt, dust, and heat for 2 weeks after surgery. These things can irritate your throat.     · For about 1 week, try to avoid crowds or people who you know have a cold or the flu. This can help prevent you from getting an infection.     · You may bathe as usual.     · Ask your doctor when you can drive again.     · You will probably need to take 1 to 2 weeks off from work. It depends on the type of work you do and how you feel. Diet    · Drink plenty of fluids to avoid becoming dehydrated.     · If it is painful to swallow, start out with Popsicles, ice cream, or cold or room-temperature drinks. Do not eat or drink red food items, such as red juice or red Jell-O. The color may make you think you are bleeding. Avoid hot drinks, soda pop, orange or tomato juice, and other acidic foods that can sting the throat. These may make throat pain worse and cause bleeding.     · For 2 weeks, choose soft foods like pudding, yogurt, canned or cooked fruit, scrambled eggs, and mashed potatoes. Avoid eating hard or scratchy foods like chips or raw vegetables.     · You may notice that your bowel movements are not regular right after your surgery. This is common. Try to avoid constipation and straining with bowel movements. You may want to take a fiber supplement every day.  If you have not had a bowel movement after a couple of days, ask your doctor about taking a mild laxative. Medicines    · Your doctor will tell you if and when you can restart your medicines. He or she will also give you instructions about taking any new medicines.     · If you take blood thinners, such as warfarin (Coumadin), clopidogrel (Plavix), or aspirin, be sure to talk to your doctor. He or she will tell you if and when to start taking those medicines again. Make sure that you understand exactly what your doctor wants you to do.     · Be safe with medicines. Take pain medicines exactly as directed. ? If the doctor gave you a prescription medicine for pain, take it as prescribed. ? If you are not taking a prescription pain medicine, ask your doctor if you can take an over-the-counter medicine.     · If you think your pain medicine is making you sick to your stomach:  ? Take your pain medicine after meals (unless your doctor has told you not to). ? Ask your doctor for a different pain medicine.     · If your doctor prescribed antibiotics, take them as directed. Do not stop taking them just because you feel better. You need to take the full course of antibiotics. Follow-up care is a key part of your treatment and safety. Be sure to make and go to all appointments, and call your doctor if you are having problems. It's also a good idea to know your test results and keep a list of the medicines you take. When should you call for help? Call 911 anytime you think you may need emergency care. For example, call if:    · You passed out (lost consciousness).     · You have severe trouble breathing.     · You have a lot of bleeding.    Call your doctor now or seek immediate medical care if:    · You have signs of infection, such as:  ? Increased pain, swelling, warmth, or redness. ? Red streaks leading from the area. ? Pus draining from the area.   ? A fever.     · You are bleeding.     · You are too sick to your stomach to drink any fluids.     · You cannot keep down fluids.     · You have new pain, or your pain gets worse.    Watch closely for changes in your health, and be sure to contact your doctor if:    · You do not get better as expected. Where can you learn more? Go to http://bev-ginger.info/. Enter W282 in the search box to learn more about \"Tonsillectomy: What to Expect at Home. \"  Current as of: March 28, 2018  Content Version: 11.8  © 8219-3374 [employer]wise, [employer]. Care instructions adapted under license by [employer] (which disclaims liability or warranty for this information). If you have questions about a medical condition or this instruction, always ask your healthcare professional. [address] any warranty or liability for your use of this information.

## 2018-12-10 NOTE — ANESTHESIA POSTPROCEDURE EVALUATION
Procedure(s): 
TONSILLECTOMY. Anesthesia Post Evaluation Multimodal analgesia: multimodal analgesia not used between 6 hours prior to anesthesia start to PACU discharge Patient location during evaluation: PACU Patient participation: complete - patient participated Level of consciousness: awake and alert Pain management: adequate Airway patency: patent Anesthetic complications: no 
Cardiovascular status: hemodynamically stable Respiratory status: acceptable Hydration status: acceptable Visit Vitals /87 (BP 1 Location: Left arm, BP Patient Position: At rest) Pulse 87 Temp 36.2 °C (97.2 °F) Resp 16 Ht 5' 9\" (1.753 m) Wt 119.9 kg (264 lb 6 oz) SpO2 95% BMI 39.04 kg/m²

## 2018-12-10 NOTE — OP NOTES
[facility] REPORT    Orville Ruth  MR#: [id number]  : 1970  ACCOUNT #: [de-identified]   DATE OF SERVICE: 12/10/2018    PREOPERATIVE DIAGNOSIS:  Chronic tonsillitis, tonsillar hypertrophy. POSTOPERATIVE DIAGNOSIS:  Chronic tonsillitis, tonsillar hypertrophy. PROCEDURE PERFORMED:  Tonsillectomy. SURGEON:  Spike Cunningham DO    ASSISTANT:  None. ANESTHESIA:  General.    COMPLICATIONS:  None. ESTIMATED BLOOD LOSS:  5 mL. HISTORY OF PRESENT ILLNESS:  This is a 49-year-old female. She came to see me because of recurrent peritonsillar abscesses. She is getting recurrent sore throats recently. She had a severe sore throat, placed on antibiotics and it got even worse. She was found to have a peritonsillar abscess. She did see Dr. Tony West and she had to do an incision and drainage to clear it out and then she was sent to see me for a tonsillectomy. She states again that the problem has been going on for years. She most recently had a peritonsillar abscess, but the tonsil problems have been so chronic, she would prefer at this point just to have her tonsils removed. Tonsils are 3+ to 4+ in size. She has a very narrow oropharyngeal airway due to not just the tonsils but a lot of redundant tissue. There is no sign of any active infection. So based on the history and physical exam, it was my recommendation that she undergo a tonsillectomy procedure. Risks and benefits were discussed with her in the office. All questions were answered and she is agreeable to the surgery. SURGERY ITSELF:  The patient was identified in the preoperative waiting area, taken back to the operating room where she underwent general anesthesia. The bed was turned 90 degrees. A Hilton-Gustavo mouth gag was inserted and opened.   A curved Allis was used to medialize the left tonsil which was removed using the Bovie and the tonsillar fossa was cauterized using suction cautery. There was minimal bleeding from the left side. This was then repeated on the right side and again a curved Allis used to medialize the right tonsil which was removed using the Bovie and a tonsillar fossa was cauterized using suction cautery and there was minimal bleeding from the right side, so once there was good hemostasis. This did give her a much wider oropharyngeal lobe airway and other still a little redundant tissue from the other soft tissues of the throat. But tonsils were completely removed. Hilton-Gustavo mouth gag was released and removed. The patient was awakened and taken to postop recovery room in stable condition.       DO CORINA Agustin / JAKY  D: 12/10/2018 14:43     T: 12/10/2018 16:27  JOB #: [id number]

## 2020-02-29 ENCOUNTER — HOSPITAL ENCOUNTER (EMERGENCY)
Age: 50
Discharge: HOME | End: 2020-02-29
Attending: STUDENT IN AN ORGANIZED HEALTH CARE EDUCATION/TRAINING PROGRAM
Payer: MEDICARE

## 2020-02-29 ENCOUNTER — APPOINTMENT (OUTPATIENT)
Dept: GENERAL RADIOLOGY | Age: 50
End: 2020-02-29
Attending: EMERGENCY MEDICINE
Payer: MEDICARE

## 2020-02-29 ENCOUNTER — APPOINTMENT (OUTPATIENT)
Dept: GENERAL RADIOLOGY | Age: 50
End: 2020-02-29
Attending: NURSE PRACTITIONER
Payer: MEDICARE

## 2020-02-29 VITALS
HEART RATE: 88 BPM | WEIGHT: 252 LBS | RESPIRATION RATE: 18 BRPM | TEMPERATURE: 97.8 F | HEIGHT: 69 IN | BODY MASS INDEX: 37.33 KG/M2 | SYSTOLIC BLOOD PRESSURE: 178 MMHG | DIASTOLIC BLOOD PRESSURE: 105 MMHG | OXYGEN SATURATION: 95 %

## 2020-02-29 DIAGNOSIS — R03.0 ELEVATED BLOOD PRESSURE READING: ICD-10-CM

## 2020-02-29 DIAGNOSIS — S16.1XXA STRAIN OF NECK MUSCLE, INITIAL ENCOUNTER: ICD-10-CM
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
## 2020-02-29 DIAGNOSIS — S39.012A LUMBAR STRAIN, INITIAL ENCOUNTER: ICD-10-CM

## 2020-02-29 PROCEDURE — 72100 X-RAY EXAM L-S SPINE 2/3 VWS: CPT

## 2020-02-29 PROCEDURE — 73030 X-RAY EXAM OF SHOULDER: CPT

## 2020-02-29 PROCEDURE — 72040 X-RAY EXAM NECK SPINE 2-3 VW: CPT

## 2020-02-29 PROCEDURE — 99284 EMERGENCY DEPT VISIT MOD MDM: CPT

## 2020-02-29 PROCEDURE — 74011250637 HC RX REV CODE- 250/637: Performed by: NURSE PRACTITIONER

## 2020-02-29 PROCEDURE — 74011000250 HC RX REV CODE- 250: Performed by: NURSE PRACTITIONER

## 2020-02-29 RX ORDER — METAXALONE 800 MG/1
400 TABLET ORAL 2 TIMES DAILY
Qty: 5 TAB | Refills: 0 | Status: SHIPPED | OUTPATIENT
Start: 2020-02-29 | End: 2020-03-05

## 2020-02-29 RX ORDER — LIDOCAINE 4 G/100G
PATCH TOPICAL
Qty: 10 PATCH | Refills: 0 | Status: SHIPPED | OUTPATIENT
Start: 2020-02-29

## 2020-02-29 RX ORDER — CYCLOBENZAPRINE HCL 10 MG
5 TABLET ORAL
Status: COMPLETED | OUTPATIENT
Start: 2020-02-29 | End: 2020-02-29

## 2020-02-29 RX ORDER — DICLOFENAC SODIUM 10 MG/G
4 GEL TOPICAL 4 TIMES DAILY
Qty: 2 EACH | Refills: 0 | Status: SHIPPED | OUTPATIENT
Start: 2020-02-29 | End: 2020-03-07

## 2020-02-29 RX ORDER — LIDOCAINE 4 G/100G
1 PATCH TOPICAL
Status: DISCONTINUED | OUTPATIENT
Start: 2020-02-29 | End: 2020-02-29 | Stop reason: HOSPADM

## 2020-02-29 RX ADMIN — CYCLOBENZAPRINE 5 MG: 10 TABLET, FILM COATED ORAL at 18:55
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
## 2020-02-29 NOTE — LETTER
[facility] EMERGENCY DEPT 
[address] [facility] 
[phone] Work/School Note Date: 2/29/2020 To Whom It May concern: 
 
Silvia Arevalo was seen and treated today in the emergency room by the following provider(s): 
Attending Provider: Isi Gerard MD 
Nurse Practitioner: Kizzy Monroe NP. Silvia Arevalo may return to work on Tuesday. Sincerely, Ute Dugan NP
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
## 2020-03-01 NOTE — ED NOTES
I have reviewed discharge instructions with the patient. The patient verbalized understanding. Patient left ED via Discharge Method: ambulatory to Home with self. Opportunity for questions and clarification provided. Patient given 3 scripts. To continue your aftercare when you leave the hospital, you may receive an automated call from our care team to check in on how you are doing. This is a free service and part of our promise to provide the best care and service to meet your aftercare needs.  If you have questions, or wish to unsubscribe from this service please call [phone]. Thank you for Choosing our [facility] Emergency Department.

## 2020-11-18 ENCOUNTER — TRANSCRIBE ORDER (OUTPATIENT)
Dept: SCHEDULING | Age: 50
End: 2020-11-18

## 2020-11-18 DIAGNOSIS — Z12.31 ENCOUNTER FOR SCREENING MAMMOGRAM FOR MALIGNANT NEOPLASM OF BREAST: Primary | ICD-10-CM

## 2021-03-15 ENCOUNTER — HOSPITAL ENCOUNTER (INPATIENT)
Age: 51
LOS: 4 days | Discharge: HOME | DRG: 166 | End: 2021-03-19
Attending: STUDENT IN AN ORGANIZED HEALTH CARE EDUCATION/TRAINING PROGRAM | Admitting: FAMILY MEDICINE
Payer: MEDICARE

## 2021-03-15 ENCOUNTER — APPOINTMENT (OUTPATIENT)
Dept: GENERAL RADIOLOGY | Age: 51
DRG: 166 | End: 2021-03-15
Attending: EMERGENCY MEDICINE
Payer: MEDICARE

## 2021-03-15 ENCOUNTER — APPOINTMENT (OUTPATIENT)
Dept: CT IMAGING | Age: 51
DRG: 166 | End: 2021-03-15
Attending: STUDENT IN AN ORGANIZED HEALTH CARE EDUCATION/TRAINING PROGRAM
Payer: MEDICARE

## 2021-03-15 DIAGNOSIS — I26.99 OTHER ACUTE PULMONARY EMBOLISM, UNSPECIFIED WHETHER ACUTE COR PULMONALE PRESENT (HCC): Primary | ICD-10-CM

## 2021-03-15 PROBLEM — E87.1 HYPONATREMIA: Status: ACTIVE | Noted: 2021-03-15

## 2021-03-15 PROBLEM — D64.9 NORMOCYTIC ANEMIA: Status: ACTIVE | Noted: 2021-03-15

## 2021-03-15 PROBLEM — D72.829 LEUKOCYTOSIS: Status: ACTIVE | Noted: 2021-03-15

## 2021-03-15 LAB
ALBUMIN SERPL-MCNC: 2.7 G/DL (ref 3.5–5)
ALBUMIN/GLOB SERPL: 0.4 {RATIO} (ref 1.2–3.5)
ALP SERPL-CCNC: 134 U/L (ref 50–136)
ALT SERPL-CCNC: 33 U/L (ref 12–65)
AMPHET UR QL SCN: NEGATIVE
ANION GAP SERPL CALC-SCNC: 2 MMOL/L (ref 7–16)
APPEARANCE UR: CLEAR
AST SERPL-CCNC: 114 U/L (ref 15–37)
BARBITURATES UR QL SCN: NEGATIVE
BASOPHILS # BLD: 0.1 K/UL (ref 0–0.2)
BASOPHILS # BLD: 0.1 K/UL (ref 0–0.2)
BASOPHILS NFR BLD: 0 % (ref 0–2)
BASOPHILS NFR BLD: 1 % (ref 0–2)
BENZODIAZ UR QL: NEGATIVE
BILIRUB SERPL-MCNC: 0.5 MG/DL (ref 0.2–1.1)
BILIRUB UR QL: NEGATIVE
BNP SERPL-MCNC: 846 PG/ML (ref 5–125)
BUN SERPL-MCNC: 13 MG/DL (ref 6–23)
CALCIUM SERPL-MCNC: 8.4 MG/DL (ref 8.3–10.4)
CANNABINOIDS UR QL SCN: NEGATIVE
CHLORIDE SERPL-SCNC: 104 MMOL/L (ref 98–107)
CO2 SERPL-SCNC: 26 MMOL/L (ref 21–32)
COCAINE UR QL SCN: NEGATIVE
COLOR UR: YELLOW
CREAT SERPL-MCNC: 0.8 MG/DL (ref 0.6–1)
DIFFERENTIAL METHOD BLD: ABNORMAL
DIFFERENTIAL METHOD BLD: ABNORMAL
EOSINOPHIL # BLD: 0.1 K/UL (ref 0–0.8)
EOSINOPHIL # BLD: 0.1 K/UL (ref 0–0.8)
EOSINOPHIL NFR BLD: 1 % (ref 0.5–7.8)
EOSINOPHIL NFR BLD: 1 % (ref 0.5–7.8)
ERYTHROCYTE [DISTWIDTH] IN BLOOD BY AUTOMATED COUNT: 17.5 % (ref 11.9–14.6)
ERYTHROCYTE [DISTWIDTH] IN BLOOD BY AUTOMATED COUNT: 17.9 % (ref 11.9–14.6)
GLOBULIN SER CALC-MCNC: 6.1 G/DL (ref 2.3–3.5)
GLUCOSE SERPL-MCNC: 78 MG/DL (ref 65–100)
GLUCOSE UR STRIP.AUTO-MCNC: NEGATIVE MG/DL
HCT VFR BLD AUTO: 34.9 % (ref 35.8–46.3)
HCT VFR BLD AUTO: 37.2 % (ref 35.8–46.3)
HGB BLD-MCNC: 11 G/DL (ref 11.7–15.4)
HGB BLD-MCNC: 11.5 G/DL (ref 11.7–15.4)
HGB UR QL STRIP: NEGATIVE
IMM GRANULOCYTES # BLD AUTO: 0.1 K/UL (ref 0–0.5)
IMM GRANULOCYTES # BLD AUTO: 0.1 K/UL (ref 0–0.5)
IMM GRANULOCYTES NFR BLD AUTO: 0 % (ref 0–5)
IMM GRANULOCYTES NFR BLD AUTO: 1 % (ref 0–5)
KETONES UR QL STRIP.AUTO: NEGATIVE MG/DL
LEUKOCYTE ESTERASE UR QL STRIP.AUTO: NEGATIVE
LYMPHOCYTES # BLD: 1.6 K/UL (ref 0.5–4.6)
LYMPHOCYTES # BLD: 2.2 K/UL (ref 0.5–4.6)
LYMPHOCYTES NFR BLD: 11 % (ref 13–44)
LYMPHOCYTES NFR BLD: 15 % (ref 13–44)
MCH RBC QN AUTO: 25.6 PG (ref 26.1–32.9)
MCH RBC QN AUTO: 25.8 PG (ref 26.1–32.9)
MCHC RBC AUTO-ENTMCNC: 30.9 G/DL (ref 31.4–35)
MCHC RBC AUTO-ENTMCNC: 31.5 G/DL (ref 31.4–35)
MCV RBC AUTO: 81.9 FL (ref 79.6–97.8)
MCV RBC AUTO: 82.9 FL (ref 79.6–97.8)
METHADONE UR QL: NEGATIVE
MONOCYTES # BLD: 1 K/UL (ref 0.1–1.3)
MONOCYTES # BLD: 1.2 K/UL (ref 0.1–1.3)
MONOCYTES NFR BLD: 7 % (ref 4–12)
MONOCYTES NFR BLD: 8 % (ref 4–12)
NEUTS SEG # BLD: 10.9 K/UL (ref 1.7–8.2)
NEUTS SEG # BLD: 11.9 K/UL (ref 1.7–8.2)
NEUTS SEG NFR BLD: 76 % (ref 43–78)
NEUTS SEG NFR BLD: 79 % (ref 43–78)
NITRITE UR QL STRIP.AUTO: NEGATIVE
NRBC # BLD: 0 K/UL (ref 0–0.2)
NRBC # BLD: 0 K/UL (ref 0–0.2)
OPIATES UR QL: POSITIVE
PCP UR QL: NEGATIVE
PH UR STRIP: 7.5 [PH] (ref 5–9)
PLATELET # BLD AUTO: 266 K/UL (ref 150–450)
PLATELET # BLD AUTO: 292 K/UL (ref 150–450)
PMV BLD AUTO: 9.1 FL (ref 9.4–12.3)
PMV BLD AUTO: 9.8 FL (ref 9.4–12.3)
POTASSIUM SERPL-SCNC: 4.1 MMOL/L (ref 3.5–5.1)
POTASSIUM SERPL-SCNC: 9.5 MMOL/L (ref 3.5–5.1)
PROT SERPL-MCNC: 8.8 G/DL (ref 6.3–8.2)
PROT UR STRIP-MCNC: NEGATIVE MG/DL
RBC # BLD AUTO: 4.26 M/UL (ref 4.05–5.2)
RBC # BLD AUTO: 4.49 M/UL (ref 4.05–5.2)
SODIUM SERPL-SCNC: 132 MMOL/L (ref 136–145)
SP GR UR REFRACTOMETRY: 1.01 (ref 1–1.02)
TROPONIN-HIGH SENSITIVITY: 51.5 PG/ML (ref 0–14)
TROPONIN-HIGH SENSITIVITY: 56.4 PG/ML (ref 0–14)
UFH PPP CHRO-ACNC: <0.1 IU/ML (ref 0.3–0.7)
UROBILINOGEN UR QL STRIP.AUTO: 2 EU/DL (ref 0.2–1)
WBC # BLD AUTO: 14.4 K/UL (ref 4.3–11.1)
WBC # BLD AUTO: 15 K/UL (ref 4.3–11.1)

## 2021-03-15 PROCEDURE — 74011000636 HC RX REV CODE- 636: Performed by: STUDENT IN AN ORGANIZED HEALTH CARE EDUCATION/TRAINING PROGRAM

## 2021-03-15 PROCEDURE — 85520 HEPARIN ASSAY: CPT

## 2021-03-15 PROCEDURE — 99285 EMERGENCY DEPT VISIT HI MDM: CPT

## 2021-03-15 PROCEDURE — 83880 ASSAY OF NATRIURETIC PEPTIDE: CPT

## 2021-03-15 PROCEDURE — 96365 THER/PROPH/DIAG IV INF INIT: CPT

## 2021-03-15 PROCEDURE — 65270000029 HC RM PRIVATE

## 2021-03-15 PROCEDURE — 96375 TX/PRO/DX INJ NEW DRUG ADDON: CPT

## 2021-03-15 PROCEDURE — 93005 ELECTROCARDIOGRAM TRACING: CPT | Performed by: EMERGENCY MEDICINE

## 2021-03-15 PROCEDURE — 74011250636 HC RX REV CODE- 250/636: Performed by: STUDENT IN AN ORGANIZED HEALTH CARE EDUCATION/TRAINING PROGRAM

## 2021-03-15 PROCEDURE — 84484 ASSAY OF TROPONIN QUANT: CPT

## 2021-03-15 PROCEDURE — 80307 DRUG TEST PRSMV CHEM ANLYZR: CPT

## 2021-03-15 PROCEDURE — 96376 TX/PRO/DX INJ SAME DRUG ADON: CPT

## 2021-03-15 PROCEDURE — 80053 COMPREHEN METABOLIC PANEL: CPT

## 2021-03-15 PROCEDURE — 96374 THER/PROPH/DIAG INJ IV PUSH: CPT

## 2021-03-15 PROCEDURE — 71260 CT THORAX DX C+: CPT

## 2021-03-15 PROCEDURE — 85730 THROMBOPLASTIN TIME PARTIAL: CPT

## 2021-03-15 PROCEDURE — 84132 ASSAY OF SERUM POTASSIUM: CPT

## 2021-03-15 PROCEDURE — 36415 COLL VENOUS BLD VENIPUNCTURE: CPT

## 2021-03-15 PROCEDURE — 81003 URINALYSIS AUTO W/O SCOPE: CPT

## 2021-03-15 PROCEDURE — 85025 COMPLETE CBC W/AUTO DIFF WBC: CPT

## 2021-03-15 PROCEDURE — 74011000258 HC RX REV CODE- 258: Performed by: STUDENT IN AN ORGANIZED HEALTH CARE EDUCATION/TRAINING PROGRAM

## 2021-03-15 PROCEDURE — 71045 X-RAY EXAM CHEST 1 VIEW: CPT

## 2021-03-15 RX ORDER — MORPHINE SULFATE 2 MG/ML
2 INJECTION, SOLUTION INTRAMUSCULAR; INTRAVENOUS
Status: COMPLETED | OUTPATIENT
Start: 2021-03-15 | End: 2021-03-15

## 2021-03-15 RX ORDER — AMITRIPTYLINE HYDROCHLORIDE 50 MG/1
150 TABLET, FILM COATED ORAL
Status: DISCONTINUED | OUTPATIENT
Start: 2021-03-16 | End: 2021-03-19 | Stop reason: HOSPADM

## 2021-03-15 RX ORDER — ZOLPIDEM TARTRATE 5 MG/1
5 TABLET ORAL
Status: DISCONTINUED | OUTPATIENT
Start: 2021-03-16 | End: 2021-03-19 | Stop reason: HOSPADM

## 2021-03-15 RX ORDER — PANTOPRAZOLE SODIUM 40 MG/1
40 TABLET, DELAYED RELEASE ORAL
Status: DISCONTINUED | OUTPATIENT
Start: 2021-03-16 | End: 2021-03-19 | Stop reason: HOSPADM

## 2021-03-15 RX ORDER — SODIUM CHLORIDE 9 MG/ML
150 INJECTION, SOLUTION INTRAVENOUS CONTINUOUS
Status: DISCONTINUED | OUTPATIENT
Start: 2021-03-16 | End: 2021-03-16

## 2021-03-15 RX ORDER — LORAZEPAM 2 MG/ML
1 INJECTION INTRAMUSCULAR
Status: COMPLETED | OUTPATIENT
Start: 2021-03-15 | End: 2021-03-15

## 2021-03-15 RX ORDER — SODIUM CHLORIDE 0.9 % (FLUSH) 0.9 %
10 SYRINGE (ML) INJECTION
Status: COMPLETED | OUTPATIENT
Start: 2021-03-15 | End: 2021-03-15

## 2021-03-15 RX ORDER — SODIUM CHLORIDE 0.9 % (FLUSH) 0.9 %
5-40 SYRINGE (ML) INJECTION EVERY 8 HOURS
Status: DISCONTINUED | OUTPATIENT
Start: 2021-03-16 | End: 2021-03-19 | Stop reason: HOSPADM

## 2021-03-15 RX ORDER — QUETIAPINE FUMARATE 100 MG/1
400 TABLET, FILM COATED ORAL 2 TIMES DAILY
Status: DISCONTINUED | OUTPATIENT
Start: 2021-03-16 | End: 2021-03-16

## 2021-03-15 RX ORDER — DEXTROAMPHETAMINE SACCHARATE, AMPHETAMINE ASPARTATE, DEXTROAMPHETAMINE SULFATE AND AMPHETAMINE SULFATE 3.75; 3.75; 3.75; 3.75 MG/1; MG/1; MG/1; MG/1
15 TABLET ORAL 2 TIMES DAILY
COMMUNITY
End: 2021-03-19

## 2021-03-15 RX ORDER — HEPARIN SODIUM 5000 [USP'U]/ML
80 INJECTION, SOLUTION INTRAVENOUS; SUBCUTANEOUS ONCE
Status: COMPLETED | OUTPATIENT
Start: 2021-03-15 | End: 2021-03-15

## 2021-03-15 RX ORDER — SODIUM CHLORIDE 0.9 % (FLUSH) 0.9 %
5-40 SYRINGE (ML) INJECTION AS NEEDED
Status: DISCONTINUED | OUTPATIENT
Start: 2021-03-15 | End: 2021-03-19 | Stop reason: HOSPADM

## 2021-03-15 RX ORDER — DILTIAZEM HYDROCHLORIDE 120 MG/1
120 CAPSULE, COATED, EXTENDED RELEASE ORAL DAILY
Status: DISCONTINUED | OUTPATIENT
Start: 2021-03-16 | End: 2021-03-19 | Stop reason: HOSPADM

## 2021-03-15 RX ORDER — HEPARIN SODIUM 5000 [USP'U]/100ML
18-36 INJECTION, SOLUTION INTRAVENOUS
Status: DISCONTINUED | OUTPATIENT
Start: 2021-03-15 | End: 2021-03-15 | Stop reason: SDUPTHER

## 2021-03-15 RX ORDER — HYDROCODONE BITARTRATE AND ACETAMINOPHEN 10; 325 MG/1; MG/1
1 TABLET ORAL
Status: DISCONTINUED | OUTPATIENT
Start: 2021-03-15 | End: 2021-03-19 | Stop reason: HOSPADM

## 2021-03-15 RX ORDER — HEPARIN SODIUM 5000 [USP'U]/100ML
18-36 INJECTION, SOLUTION INTRAVENOUS
Status: DISCONTINUED | OUTPATIENT
Start: 2021-03-16 | End: 2021-03-16

## 2021-03-15 RX ORDER — ALPRAZOLAM 0.5 MG/1
2 TABLET ORAL
Status: DISCONTINUED | OUTPATIENT
Start: 2021-03-15 | End: 2021-03-19 | Stop reason: HOSPADM

## 2021-03-15 RX ADMIN — HEPARIN SODIUM 9150 UNITS: 5000 INJECTION INTRAVENOUS; SUBCUTANEOUS at 21:33

## 2021-03-15 RX ADMIN — LORAZEPAM 1 MG: 2 INJECTION INTRAMUSCULAR; INTRAVENOUS at 19:52

## 2021-03-15 RX ADMIN — HEPARIN SODIUM 18 UNITS/KG/HR: 5000 INJECTION, SOLUTION INTRAVENOUS at 21:33

## 2021-03-15 RX ADMIN — Medication 10 ML: at 20:42

## 2021-03-15 RX ADMIN — IOPAMIDOL 80 ML: 755 INJECTION, SOLUTION INTRAVENOUS at 20:42

## 2021-03-15 RX ADMIN — MORPHINE SULFATE 2 MG: 2 INJECTION, SOLUTION INTRAMUSCULAR; INTRAVENOUS at 21:50

## 2021-03-15 RX ADMIN — SODIUM CHLORIDE 100 ML: 900 INJECTION, SOLUTION INTRAVENOUS at 20:42

## 2021-03-15 NOTE — ED TRIAGE NOTES
Pt arrives POV. Complains of cough and SOB x 3 days.  Last night she felt tight in her chest. Symptoms worse today with exertion

## 2021-03-15 NOTE — ED PROVIDER NOTES
75-year-old female presents to the emergency department complaining of chest pain and some shortness of breath. States chest pain is bilateral, worse with inhalation, reports a cough with sputum production over the last 2 to 3 days. States I symptoms have been ongoing from that same timeframe. Smokes 5 to 6 cigarettes daily. Denies fever or chills. Reports lower extremity swelling worse on the left. Has history of DVT, states she has a IVC filter in place. Reports chest pain 5/10 in severity, no radiation of the pain, no diaphoresis, nausea or vomiting. Denies any exertional component of her chest pain. No improving or worsening factors. Patient reports she has left hand pain, from recent fracture, left hand is in splint per orthopedics. Patient also reports history of anxiety, requesting to be given Xanax here in the emerge department which she takes at home.              Past Medical History:   Diagnosis Date    Anemia     no supplements     Anxiety     Managed with meds     Asthma     none since stopped smoking-- 6 months ago, Inhalers prn     Benzodiazepine (tranquilizer) overdose 20 yrs ago    BMI 36.0-36.9,adult     Chronic pain     back, knees    Depression     medication    GERD (gastroesophageal reflux disease)     controlled with medication    Hypertension     controlled with med    Obese     bmi =35    IAN (obstructive sleep apnea)     DOES NOT WEAR CPAP    Panic attacks     S/P gastric bypass 2013    wt loss 60lbs-- gained all back    Schizophrenia ([address]     Seizures ([address]     one seizure several yrs ago- undetermined cause     Suicide, attempted 21 YRS AGO    [facility] 2010    left leg-- IVF PLACED    Thyroid disease     HYPOTHYROIDISM       Past Surgical History:   Procedure Laterality Date    COLONOSCOPY N/A 3/7/2018    COLONOSCOPY  BMI 39 performed by Awilda More MD at [facility] ENDOSCOPY    HX BREAST REDUCTION      HX GASTRECTOMY  3/4/13    Sleeve    HX HYSTERECTOMY      HX OOPHORECTOMY      HX TUBAL LIGATION      HX VASCULAR ACCESS  2010    IVF PLACED         Family History:   Problem Relation Age of Onset    Cancer Father     Breast Cancer Neg Hx        Social History     Socioeconomic History    Marital status: SINGLE     Spouse name: Not on file    Number of children: Not on file    Years of education: Not on file    Highest education level: Not on file   Occupational History    Not on file   Social Needs    Financial resource strain: Not on file    Food insecurity     Worry: Not on file     Inability: Not on file    Transportation needs     Medical: Not on file     Non-medical: Not on file   Tobacco Use    Smoking status: Former Smoker     Packs/day: 0.25     Years: 12.00     Pack years: 3.00     Quit date: 6/3/2018     Years since quittin.7    Smokeless tobacco: Never Used    Tobacco comment: current social smoker (noted 18)   Substance and Sexual Activity    Alcohol use: Yes     Alcohol/week: 0.0 standard drinks     Comment: occ    Drug use: No    Sexual activity: Not on file   Lifestyle    Physical activity     Days per week: Not on file     Minutes per session: Not on file    Stress: Not on file   Relationships    Social connections     Talks on phone: Not on file     Gets together: Not on file     Attends Zoroastrianism service: Not on file     Active member of club or organization: Not on file     Attends meetings of clubs or organizations: Not on file     Relationship status: Not on file    Intimate partner violence     Fear of current or ex partner: Not on file     Emotionally abused: Not on file     Physically abused: Not on file     Forced sexual activity: Not on file   Other Topics Concern    Not on file   Social History Narrative    Not on file         ALLERGIES: Ace inhibitors and Lisinopril    Review of Systems   Constitutional: Negative for chills, fatigue and fever.    HENT: Negative for facial swelling and sore throat. Eyes: Negative for visual disturbance. Respiratory: Positive for cough and shortness of breath. Negative for chest tightness. Cardiovascular: Positive for chest pain and leg swelling. Negative for palpitations. Gastrointestinal: Negative for abdominal pain, diarrhea, nausea and vomiting. Genitourinary: Negative for difficulty urinating, dysuria and flank pain. Musculoskeletal: Negative for myalgias, neck pain and neck stiffness. Skin: Negative for color change. Neurological: Negative for dizziness, speech difficulty, weakness, numbness and headaches. Psychiatric/Behavioral: Negative for confusion. Vitals:    03/15/21 1705   BP: (!) 166/101   Pulse: (!) 114   Resp: 18   Temp: 98.6 °F (37 °C)   SpO2: 94%   Weight: 114.3 kg (252 lb)   Height: 5' 9\" (1.753 m)            Physical Exam  Vitals signs and nursing note reviewed. Constitutional:       Appearance: Normal appearance. She is not ill-appearing or toxic-appearing. HENT:      Head: Normocephalic and atraumatic. Nose: Nose normal.      Mouth/Throat:      Mouth: Mucous membranes are moist.   Eyes:      Extraocular Movements: Extraocular movements intact. Neck:      Musculoskeletal: Normal range of motion. No neck rigidity. Cardiovascular:      Rate and Rhythm: Regular rhythm. Tachycardia present. Pulses: Normal pulses. Heart sounds: Normal heart sounds. Pulmonary:      Effort: Pulmonary effort is normal. No respiratory distress. Breath sounds: Normal breath sounds. Chest:      Chest wall: No tenderness. Abdominal:      General: Abdomen is flat. There is no distension. Palpations: Abdomen is soft. Tenderness: There is no abdominal tenderness. Musculoskeletal: Normal range of motion. Right lower leg: Edema present. Left lower leg: Edema present. Comments: Edema present bilateral lower extremities, left greater than right. Nontender. Skin:     General: Skin is warm and dry. Neurological:      General: No focal deficit present. Mental Status: She is alert and oriented to person, place, and time. Psychiatric:         Mood and Affect: Mood normal.          MDM  Number of Diagnoses or Management Options  Other acute pulmonary embolism, unspecified whether acute cor pulmonale present [facility])  Diagnosis management comments: 57-year-old female, mildly anxious on exam, presents to the ER with complaints of chest pain shortness of breath. States symptoms have been ongoing for the last 2 to 3 days. Requesting her home dose of Xanax which she did not take. This will be provided for her. Patient has history of DVT PE, has filter in place. Given patient's chest pain, will obtain additional imaging. Chest x-ray shows cardiomegaly which is new. Lab resulted showing white count 15,000, stable H&H, initial potassium was hemolyzed, this was repeated, potassium is 4.1, normal kidney function, first troponin 51, repeat 56, . CT of the chest showed bilateral pulmonary emboli. Patient was started on heparin bolus and drip. Patient with normal O2 saturation on 2 L nasal cannula. Patient with no significant blood pressure abnormalities. But is mildly tachycardic. I then spoke with the hospitalist who agreed admit this patient for continued evaluation and treatment. Patient voiced understanding and appreciation with this plan.                    Amount and/or Complexity of Data Reviewed  Clinical lab tests: ordered and reviewed  Tests in the radiology section of CPT®: ordered and reviewed  Discussion of test results with the performing providers: yes  Discuss the patient with other providers: yes  Independent visualization of images, tracings, or specimens: yes    Risk of Complications, Morbidity, and/or Mortality  Presenting problems: moderate  Diagnostic procedures: moderate  Management options: high           Procedures

## 2021-03-16 ENCOUNTER — APPOINTMENT (OUTPATIENT)
Dept: ULTRASOUND IMAGING | Age: 51
DRG: 166 | End: 2021-03-16
Attending: RADIOLOGY
Payer: MEDICARE

## 2021-03-16 ENCOUNTER — APPOINTMENT (OUTPATIENT)
Dept: INTERVENTIONAL RADIOLOGY/VASCULAR | Age: 51
DRG: 166 | End: 2021-03-16
Attending: HOSPITALIST
Payer: MEDICARE

## 2021-03-16 LAB
ANION GAP SERPL CALC-SCNC: 5 MMOL/L (ref 7–16)
APTT PPP: 35.2 SEC (ref 24.1–35.1)
APTT PPP: >200 SEC (ref 24.1–35.1)
ATRIAL RATE: 115 BPM
BASOPHILS # BLD: 0.1 K/UL (ref 0–0.2)
BASOPHILS # BLD: 0.1 K/UL (ref 0–0.2)
BASOPHILS NFR BLD: 1 % (ref 0–2)
BASOPHILS NFR BLD: 1 % (ref 0–2)
BUN SERPL-MCNC: 11 MG/DL (ref 6–23)
CALCIUM SERPL-MCNC: 8.8 MG/DL (ref 8.3–10.4)
CALCULATED P AXIS, ECG09: 73 DEGREES
CALCULATED R AXIS, ECG10: 14 DEGREES
CALCULATED T AXIS, ECG11: 67 DEGREES
CHLORIDE SERPL-SCNC: 108 MMOL/L (ref 98–107)
CO2 SERPL-SCNC: 26 MMOL/L (ref 21–32)
CREAT SERPL-MCNC: 0.7 MG/DL (ref 0.6–1)
DIAGNOSIS, 93000: NORMAL
DIFFERENTIAL METHOD BLD: ABNORMAL
DIFFERENTIAL METHOD BLD: ABNORMAL
EOSINOPHIL # BLD: 0.1 K/UL (ref 0–0.8)
EOSINOPHIL # BLD: 0.1 K/UL (ref 0–0.8)
EOSINOPHIL NFR BLD: 1 % (ref 0.5–7.8)
EOSINOPHIL NFR BLD: 1 % (ref 0.5–7.8)
ERYTHROCYTE [DISTWIDTH] IN BLOOD BY AUTOMATED COUNT: 17.3 % (ref 11.9–14.6)
ERYTHROCYTE [DISTWIDTH] IN BLOOD BY AUTOMATED COUNT: 17.6 % (ref 11.9–14.6)
FIBRINOGEN PPP-MCNC: 666 MG/DL (ref 190–501)
GLUCOSE SERPL-MCNC: 87 MG/DL (ref 65–100)
HCT VFR BLD AUTO: 30.9 % (ref 35.8–46.3)
HCT VFR BLD AUTO: 34.1 % (ref 35.8–46.3)
HGB BLD-MCNC: 10.8 G/DL (ref 11.7–15.4)
HGB BLD-MCNC: 9.5 G/DL (ref 11.7–15.4)
IMM GRANULOCYTES # BLD AUTO: 0.1 K/UL (ref 0–0.5)
IMM GRANULOCYTES # BLD AUTO: 0.1 K/UL (ref 0–0.5)
IMM GRANULOCYTES NFR BLD AUTO: 0 % (ref 0–5)
IMM GRANULOCYTES NFR BLD AUTO: 1 % (ref 0–5)
LYMPHOCYTES # BLD: 1.5 K/UL (ref 0.5–4.6)
LYMPHOCYTES # BLD: 2.1 K/UL (ref 0.5–4.6)
LYMPHOCYTES NFR BLD: 15 % (ref 13–44)
LYMPHOCYTES NFR BLD: 16 % (ref 13–44)
MCH RBC QN AUTO: 25.5 PG (ref 26.1–32.9)
MCH RBC QN AUTO: 25.6 PG (ref 26.1–32.9)
MCHC RBC AUTO-ENTMCNC: 30.7 G/DL (ref 31.4–35)
MCHC RBC AUTO-ENTMCNC: 31.7 G/DL (ref 31.4–35)
MCV RBC AUTO: 80.8 FL (ref 79.6–97.8)
MCV RBC AUTO: 83.1 FL (ref 79.6–97.8)
MONOCYTES # BLD: 0.9 K/UL (ref 0.1–1.3)
MONOCYTES # BLD: 1 K/UL (ref 0.1–1.3)
MONOCYTES NFR BLD: 7 % (ref 4–12)
MONOCYTES NFR BLD: 9 % (ref 4–12)
NEUTS SEG # BLD: 10.1 K/UL (ref 1.7–8.2)
NEUTS SEG # BLD: 7.6 K/UL (ref 1.7–8.2)
NEUTS SEG NFR BLD: 75 % (ref 43–78)
NEUTS SEG NFR BLD: 75 % (ref 43–78)
NRBC # BLD: 0 K/UL (ref 0–0.2)
NRBC # BLD: 0 K/UL (ref 0–0.2)
P-R INTERVAL, ECG05: 160 MS
PLATELET # BLD AUTO: 223 K/UL (ref 150–450)
PLATELET # BLD AUTO: 250 K/UL (ref 150–450)
PMV BLD AUTO: 9.5 FL (ref 9.4–12.3)
PMV BLD AUTO: 9.5 FL (ref 9.4–12.3)
POTASSIUM SERPL-SCNC: 3.9 MMOL/L (ref 3.5–5.1)
Q-T INTERVAL, ECG07: 328 MS
QRS DURATION, ECG06: 96 MS
QTC CALCULATION (BEZET), ECG08: 453 MS
RBC # BLD AUTO: 3.72 M/UL (ref 4.05–5.2)
RBC # BLD AUTO: 4.22 M/UL (ref 4.05–5.2)
SODIUM SERPL-SCNC: 139 MMOL/L (ref 136–145)
UFH PPP CHRO-ACNC: 0.36 IU/ML (ref 0.3–0.7)
UFH PPP CHRO-ACNC: <0.1 IU/ML (ref 0.3–0.7)
UFH PPP CHRO-ACNC: >1.1 IU/ML (ref 0.3–0.7)
VENTRICULAR RATE, ECG03: 115 BPM
WBC # BLD AUTO: 10.2 K/UL (ref 4.3–11.1)
WBC # BLD AUTO: 13.5 K/UL (ref 4.3–11.1)

## 2021-03-16 PROCEDURE — 74011000250 HC RX REV CODE- 250: Performed by: HOSPITALIST

## 2021-03-16 PROCEDURE — 77030025126 HC CATH ANGI DX SUPR TORQ CARD -B

## 2021-03-16 PROCEDURE — 85520 HEPARIN ASSAY: CPT

## 2021-03-16 PROCEDURE — 76937 US GUIDE VASCULAR ACCESS: CPT

## 2021-03-16 PROCEDURE — 75774 ARTERY X-RAY EACH VESSEL: CPT

## 2021-03-16 PROCEDURE — 94760 N-INVAS EAR/PLS OXIMETRY 1: CPT

## 2021-03-16 PROCEDURE — 85384 FIBRINOGEN ACTIVITY: CPT

## 2021-03-16 PROCEDURE — 02FR3Z0 FRAGMENTATION OF LEFT PULMONARY ARTERY, PERCUTANEOUS APPROACH, ULTRASONIC: ICD-10-PCS | Performed by: RADIOLOGY

## 2021-03-16 PROCEDURE — 65620000000 HC RM CCU GENERAL

## 2021-03-16 PROCEDURE — 74011250636 HC RX REV CODE- 250/636: Performed by: RADIOLOGY

## 2021-03-16 PROCEDURE — 74011250636 HC RX REV CODE- 250/636: Performed by: HOSPITALIST

## 2021-03-16 PROCEDURE — C1751 CATH, INF, PER/CENT/MIDLINE: HCPCS

## 2021-03-16 PROCEDURE — C1894 INTRO/SHEATH, NON-LASER: HCPCS

## 2021-03-16 PROCEDURE — 77030041974 HC CATH SYS PERIPH TELE -B

## 2021-03-16 PROCEDURE — 85730 THROMBOPLASTIN TIME PARTIAL: CPT

## 2021-03-16 PROCEDURE — 94640 AIRWAY INHALATION TREATMENT: CPT

## 2021-03-16 PROCEDURE — 74011250636 HC RX REV CODE- 250/636: Performed by: FAMILY MEDICINE

## 2021-03-16 PROCEDURE — 75825 VEIN X-RAY TRUNK: CPT

## 2021-03-16 PROCEDURE — 74011250637 HC RX REV CODE- 250/637: Performed by: FAMILY MEDICINE

## 2021-03-16 PROCEDURE — 85025 COMPLETE CBC W/AUTO DIFF WBC: CPT

## 2021-03-16 PROCEDURE — 74011000250 HC RX REV CODE- 250: Performed by: INTERNAL MEDICINE

## 2021-03-16 PROCEDURE — 74011000250 HC RX REV CODE- 250: Performed by: RADIOLOGY

## 2021-03-16 PROCEDURE — C1769 GUIDE WIRE: HCPCS

## 2021-03-16 PROCEDURE — 06H03DZ INSERTION OF INTRALUMINAL DEVICE INTO INFERIOR VENA CAVA, PERCUTANEOUS APPROACH: ICD-10-PCS | Performed by: RADIOLOGY

## 2021-03-16 PROCEDURE — 77010033678 HC OXYGEN DAILY

## 2021-03-16 PROCEDURE — 36015 PLACE CATHETER IN ARTERY: CPT

## 2021-03-16 PROCEDURE — 93970 EXTREMITY STUDY: CPT

## 2021-03-16 PROCEDURE — B31T1ZZ FLUOROSCOPY OF LEFT PULMONARY ARTERY USING LOW OSMOLAR CONTRAST: ICD-10-PCS | Performed by: RADIOLOGY

## 2021-03-16 PROCEDURE — 80048 BASIC METABOLIC PNL TOTAL CA: CPT

## 2021-03-16 PROCEDURE — 36415 COLL VENOUS BLD VENIPUNCTURE: CPT

## 2021-03-16 PROCEDURE — 77030004558 HC CATH ANGI DX SUPR TORQ CARD -A

## 2021-03-16 PROCEDURE — C8929 TTE W OR WO FOL WCON,DOPPLER: HCPCS

## 2021-03-16 PROCEDURE — 36010 PLACE CATHETER IN VEIN: CPT

## 2021-03-16 PROCEDURE — 74011000636 HC RX REV CODE- 636: Performed by: RADIOLOGY

## 2021-03-16 PROCEDURE — 74011250637 HC RX REV CODE- 250/637: Performed by: INTERNAL MEDICINE

## 2021-03-16 RX ORDER — SODIUM CHLORIDE 9 MG/ML
125 INJECTION, SOLUTION INTRAVENOUS CONTINUOUS
Status: DISPENSED | OUTPATIENT
Start: 2021-03-16 | End: 2021-03-18

## 2021-03-16 RX ORDER — SODIUM CHLORIDE 9 MG/ML
125 INJECTION, SOLUTION INTRAVENOUS CONTINUOUS
Status: CANCELLED | OUTPATIENT
Start: 2021-03-16

## 2021-03-16 RX ORDER — HEPARIN SODIUM 5000 [USP'U]/100ML
300 INJECTION, SOLUTION INTRAVENOUS CONTINUOUS
Status: DISCONTINUED | OUTPATIENT
Start: 2021-03-16 | End: 2021-03-17

## 2021-03-16 RX ORDER — ACETAMINOPHEN 325 MG/1
650 TABLET ORAL
Status: DISCONTINUED | OUTPATIENT
Start: 2021-03-16 | End: 2021-03-19 | Stop reason: HOSPADM

## 2021-03-16 RX ORDER — ZOLPIDEM TARTRATE 5 MG/1
5 TABLET ORAL
Status: DISCONTINUED | OUTPATIENT
Start: 2021-03-16 | End: 2021-03-19 | Stop reason: HOSPADM

## 2021-03-16 RX ORDER — OXYCODONE HYDROCHLORIDE 5 MG/1
5 TABLET ORAL
Status: DISCONTINUED | OUTPATIENT
Start: 2021-03-16 | End: 2021-03-19 | Stop reason: HOSPADM

## 2021-03-16 RX ORDER — MIDAZOLAM HYDROCHLORIDE 1 MG/ML
.5-2 INJECTION, SOLUTION INTRAMUSCULAR; INTRAVENOUS
Status: DISCONTINUED | OUTPATIENT
Start: 2021-03-16 | End: 2021-03-16

## 2021-03-16 RX ORDER — LORAZEPAM 2 MG/ML
1 INJECTION INTRAMUSCULAR
Status: DISCONTINUED | OUTPATIENT
Start: 2021-03-16 | End: 2021-03-19 | Stop reason: HOSPADM

## 2021-03-16 RX ORDER — LIDOCAINE HYDROCHLORIDE 20 MG/ML
2-20 INJECTION, SOLUTION INFILTRATION; PERINEURAL ONCE
Status: COMPLETED | OUTPATIENT
Start: 2021-03-16 | End: 2021-03-16

## 2021-03-16 RX ORDER — HYDROMORPHONE HYDROCHLORIDE 1 MG/ML
1 INJECTION, SOLUTION INTRAMUSCULAR; INTRAVENOUS; SUBCUTANEOUS
Status: DISCONTINUED | OUTPATIENT
Start: 2021-03-16 | End: 2021-03-19 | Stop reason: HOSPADM

## 2021-03-16 RX ORDER — LEVALBUTEROL INHALATION SOLUTION 0.63 MG/3ML
0.63 SOLUTION RESPIRATORY (INHALATION)
Status: DISCONTINUED | OUTPATIENT
Start: 2021-03-16 | End: 2021-03-19

## 2021-03-16 RX ORDER — FENTANYL CITRATE 50 UG/ML
25-50 INJECTION, SOLUTION INTRAMUSCULAR; INTRAVENOUS
Status: DISCONTINUED | OUTPATIENT
Start: 2021-03-16 | End: 2021-03-16

## 2021-03-16 RX ORDER — METOPROLOL TARTRATE 5 MG/5ML
2.5 INJECTION INTRAVENOUS
Status: DISCONTINUED | OUTPATIENT
Start: 2021-03-16 | End: 2021-03-19 | Stop reason: HOSPADM

## 2021-03-16 RX ORDER — SODIUM CHLORIDE 9 MG/ML
35 INJECTION, SOLUTION INTRAVENOUS CONTINUOUS
Status: DISCONTINUED | OUTPATIENT
Start: 2021-03-16 | End: 2021-03-17

## 2021-03-16 RX ORDER — SODIUM CHLORIDE 9 MG/ML
25 INJECTION, SOLUTION INTRAVENOUS CONTINUOUS
Status: DISCONTINUED | OUTPATIENT
Start: 2021-03-16 | End: 2021-03-16

## 2021-03-16 RX ORDER — LABETALOL HYDROCHLORIDE 5 MG/ML
20 INJECTION, SOLUTION INTRAVENOUS
Status: DISCONTINUED | OUTPATIENT
Start: 2021-03-16 | End: 2021-03-17

## 2021-03-16 RX ADMIN — LEVALBUTEROL HYDROCHLORIDE 0.63 MG: 0.63 SOLUTION RESPIRATORY (INHALATION) at 23:52

## 2021-03-16 RX ADMIN — SODIUM CHLORIDE 150 ML/HR: 900 INJECTION, SOLUTION INTRAVENOUS at 06:49

## 2021-03-16 RX ADMIN — HYDROCODONE BITARTRATE AND ACETAMINOPHEN 1 TABLET: 10; 325 TABLET ORAL at 05:15

## 2021-03-16 RX ADMIN — PANTOPRAZOLE SODIUM 40 MG: 40 TABLET, DELAYED RELEASE ORAL at 05:15

## 2021-03-16 RX ADMIN — MIDAZOLAM 0.5 MG: 1 INJECTION INTRAMUSCULAR; INTRAVENOUS at 15:00

## 2021-03-16 RX ADMIN — Medication 10 ML: at 21:35

## 2021-03-16 RX ADMIN — FENTANYL CITRATE 25 MCG: 50 INJECTION, SOLUTION INTRAMUSCULAR; INTRAVENOUS at 15:00

## 2021-03-16 RX ADMIN — LABETALOL HYDROCHLORIDE 20 MG: 5 INJECTION INTRAVENOUS at 19:52

## 2021-03-16 RX ADMIN — SODIUM CHLORIDE 125 ML/HR: 900 INJECTION, SOLUTION INTRAVENOUS at 15:25

## 2021-03-16 RX ADMIN — IOPAMIDOL 13 ML: 612 INJECTION, SOLUTION INTRAVENOUS at 15:25

## 2021-03-16 RX ADMIN — ALTEPLASE 1 MG/HR: 2.2 INJECTION, POWDER, LYOPHILIZED, FOR SOLUTION INTRAVENOUS at 15:25

## 2021-03-16 RX ADMIN — LIDOCAINE HYDROCHLORIDE 150 MG: 20 INJECTION, SOLUTION INFILTRATION; PERINEURAL at 15:04

## 2021-03-16 RX ADMIN — METOPROLOL TARTRATE 2.5 MG: 5 INJECTION INTRAVENOUS at 15:53

## 2021-03-16 RX ADMIN — SODIUM CHLORIDE 150 ML/HR: 900 INJECTION, SOLUTION INTRAVENOUS at 00:25

## 2021-03-16 RX ADMIN — Medication 10 ML: at 13:00

## 2021-03-16 RX ADMIN — SODIUM CHLORIDE 25 ML/HR: 900 INJECTION, SOLUTION INTRAVENOUS at 14:50

## 2021-03-16 RX ADMIN — Medication 10 ML: at 05:10

## 2021-03-16 RX ADMIN — HYDROCODONE BITARTRATE AND ACETAMINOPHEN 1 TABLET: 10; 325 TABLET ORAL at 19:51

## 2021-03-16 RX ADMIN — ZOLPIDEM TARTRATE 5 MG: 5 TABLET, COATED ORAL at 21:35

## 2021-03-16 RX ADMIN — HYDROCODONE BITARTRATE AND ACETAMINOPHEN 1 TABLET: 10; 325 TABLET ORAL at 00:24

## 2021-03-16 RX ADMIN — LEVALBUTEROL HYDROCHLORIDE 0.63 MG: 0.63 SOLUTION RESPIRATORY (INHALATION) at 20:35

## 2021-03-16 RX ADMIN — SODIUM CHLORIDE 125 ML/HR: 900 INJECTION, SOLUTION INTRAVENOUS at 23:25

## 2021-03-16 RX ADMIN — ALPRAZOLAM 2 MG: 0.5 TABLET ORAL at 07:47

## 2021-03-16 RX ADMIN — HEPARIN SODIUM 300 UNITS/HR: 5000 INJECTION, SOLUTION INTRAVENOUS at 15:25

## 2021-03-16 RX ADMIN — HYDROCODONE BITARTRATE AND ACETAMINOPHEN 1 TABLET: 10; 325 TABLET ORAL at 15:55

## 2021-03-16 RX ADMIN — DILTIAZEM HYDROCHLORIDE 120 MG: 120 CAPSULE, COATED, EXTENDED RELEASE ORAL at 08:58

## 2021-03-16 RX ADMIN — SODIUM CHLORIDE 35 ML/HR: 900 INJECTION, SOLUTION INTRAVENOUS at 15:25

## 2021-03-16 RX ADMIN — AMITRIPTYLINE HYDROCHLORIDE 150 MG: 50 TABLET, FILM COATED ORAL at 21:35

## 2021-03-16 RX ADMIN — PERFLUTREN 1 ML: 6.52 INJECTION, SUSPENSION INTRAVENOUS at 09:40

## 2021-03-16 NOTE — PROGRESS NOTES
Asked to start a PIV. Using US assistance, placed a 20g 6cm Endurance PIV in right forearm on first attempt.  Brandi Doty RN, VAT 62

## 2021-03-16 NOTE — ED NOTES
TRANSFER - OUT REPORT:    Verbal report given to [facility] RN(name) on [employer]  being transferred to 90(unit) for routine progression of care       Report consisted of patients Situation, Background, Assessment and   Recommendations(SBAR). Information from the following report(s) ED Summary was reviewed with the receiving nurse. Lines:   Peripheral IV 03/15/21 Right Hand (Active)       Peripheral IV 03/15/21 Right Antecubital (Active)   Site Assessment Clean, dry, & intact 03/15/21 1944   Phlebitis Assessment 0 03/15/21 1944   Infiltration Assessment 0 03/15/21 1944   Dressing Status Clean, dry, & intact 03/15/21 1944   Dressing Type 4 X 4;Transparent 03/15/21 1944   Hub Color/Line Status Green 03/15/21 1944        Opportunity for questions and clarification was provided.       Patient transported with:   O2 @ 2 liters

## 2021-03-16 NOTE — PROGRESS NOTES
Bedside and Verbal shift change report given to Oneal Camargo RN (oncoming nurse) by [address], RN (offgoing nurse). Report included the following information SBAR, Kardex, Intake/Output and Recent Results.    gtts rate verified at bedside
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
## 2021-03-16 NOTE — ROUTINE PROCESS
TRANSFER - IN REPORT: 
 
Verbal report received from GINA Lance(name) on [employer]  being received from ED(unit) for routine progression of care Report consisted of patients Situation, Background, Assessment and  
Recommendations(SBAR). Information from the following report(s) SBAR was reviewed with the receiving nurse. Opportunity for questions and clarification was provided. Assessment completed upon patients arrival to unit and care assumed. Primary Nurse Mary Keith and Dali Mcgee RN performed a dual skin assessment on this patient No impairment noted Augustine score is 23.

## 2021-03-16 NOTE — PROGRESS NOTES
Patient going down to IR at this time for surgery. A&Ox4. No complaints of acute pain or discomfort, just anxious. 95% on 2L at this time. Called down to IR to let them know that lab has not released Heparin XA level yet and still waiting for result. Care transferred at this time. Safety precautions maintained.

## 2021-03-16 NOTE — PROGRESS NOTES
BSN, CM met with pt at bedside with appropriate PPE and social distancing. Pt lying in bed. Pt drowsy but easily woken with soft touch and oriented to person, place, time, and situation. Pt verified demographic information. PCP verified as Dr. Meme Echols and pt was last seen by PCP about 2 months ago. Pt lives in 1 story home alone, 4 steps to enter into the home. Pt reports being independent with ADLs and driving prior to admission. Pt watches 10 y/o grandson about 5 days per week. Pt reports having no DMEs. Pt \"friend alice\" able to transport home and pt able to transport self to doctor apt,  medications, and get groceries. Pt primary pharmacy is [facility] in [place]. Pt able to afford medications. Pt reports plans to discharge \"straight\" home. Pt has used SNF in [place] years ago but never had [place] in the past. Pt has had [employer] nurse come to home previously but not routinely. No needs noted at this time. CM to continue to follow and monitor for any needs that may occur. Care Management Interventions  PCP Verified by CM: Yes(Dr. Meme Echols)  Mode of Transport at Discharge:  Other (see comment)(friend)  Transition of Care Consult (CM Consult): Discharge Planning  Discharge Durable Medical Equipment: No  Physical Therapy Consult: No  Occupational Therapy Consult: No  Speech Therapy Consult: No  Current Support Network: Own Home, Lives Alone  Confirm Follow Up Transport: Self  The Plan for Transition of Care is Related to the Following Treatment Goals : Return to baseline   Freedom of Choice List was Provided with Basic Dialogue that Supports the Patient's Individualized Plan of Care/Goals, Treatment Preferences and Shares the Quality Data Associated with the Providers?: Yes   Resource Information Provided?: No  Discharge Location  Discharge Placement: Home

## 2021-03-16 NOTE — PROGRESS NOTES
Hospitalist Progress Note    3/16/2021  Admit Date: 3/15/2021  5:57 PM   NAME: Rosales Ferraro   :  1970   MRN:  [id number]   Attending: Robert Barrientos MD  PCP:  Chu Crawford MD    SUBJECTIVE:     Rosales Ferraro is a 53-year-old -American female with history of obesity, bipolar 1, HDL, dyslipidemia, IAN, schizophrenia, prior DVT status post IVC filter admitted on 3/15 for acute hypoxic respiratory failure secondary to bilateral PE.  CT chest showed bilateral pulmonary emboli with emboli extending from the distal main pulmonary artery into arteries of left upper and lower lobes additional more distal emboli in the right upper and lower lobe,  Left upper lobe airspace changes and small pleural left pleural effusion. 3/16:  Patient seen at bedside. She appears to be lethargic and somnolent but easily aroused. Reports a slight chest discomfort 5/10 in intensity. Currently on 2 L via NC. No nausea vomiting or headache or blurry vision. Review of Systems negative with exception of pertinent positives noted above      PHYSICAL EXAM       Visit Vitals  BP (!) 153/92   Pulse (!) 110   Temp 97.8 °F (36.6 °C)   Resp 20   Ht 5' 9\" (1.753 m)   Wt 114.3 kg (252 lb)   SpO2 94%   BMI 37.21 kg/m²      Temp (24hrs), Av.1 °F (36.7 °C), Min:97.8 °F (36.6 °C), Max:98.6 °F (37 °C)    Oxygen Therapy  O2 Sat (%): 94 % (21 0430)  O2 Device: Nasal cannula (21)  O2 Flow Rate (L/min): 2 l/min (21)    Intake/Output Summary (Last 24 hours) at 3/16/2021 0821  Last data filed at 3/16/2021 0336  Gross per 24 hour   Intake 260 ml   Output 500 ml   Net -240 ml          General: Alert, lethargic, NAD, on 2-3 L via NC  Head:  Atraumatic Normocephalic. Eyes:  PERRLA, EOMI, Anicteric. ENT:  No discharges/lesions.   Lungs:  Coarse breath sounds bilaterally  CVS:  Regular rate and rhythm,  No murmur, rub, or gallop, No JVD, trace 1+ pitting edema in bilateral extremity  Abdomen: Soft, Non distended, Non tender, Positive bowel sounds. MSK:  No deformities, lesions, Spontaneously moves extremities. Neurologic:  GCS 15, no motor or sensory deficits  Psychiatry:      No anxiety/Depression  Skin:   No rash/lesions. Good skin turgor  Heme/Lymph/Immune:  No petechiae, ecchymoses, overt signs of bleeding or    lymphadenopathy noted. Recent Results (from the past 24 hour(s))   EKG, 12 LEAD, INITIAL    Collection Time: 03/15/21  5:05 PM   Result Value Ref Range    Ventricular Rate 115 BPM    Atrial Rate 115 BPM    P-R Interval 160 ms    QRS Duration 96 ms    Q-T Interval 328 ms    QTC Calculation (Bezet) 453 ms    Calculated P Axis 73 degrees    Calculated R Axis 14 degrees    Calculated T Axis 67 degrees    Diagnosis       Sinus tachycardia  Possible Left atrial enlargement  Borderline ECG  When compared with ECG of 12-MAR-2009 19:19,  No significant change was found  Confirmed by SERJIO HALL ([id number]) on 3/16/2021 7:12:37 AM     CBC WITH AUTOMATED DIFF    Collection Time: 03/15/21  5:08 PM   Result Value Ref Range    WBC 15.0 (H) 4.3 - 11.1 K/uL    RBC 4.49 4.05 - 5.2 M/uL    HGB 11.5 (L) 11.7 - 15.4 g/dL    HCT 37.2 35.8 - 46.3 %    MCV 82.9 79.6 - 97.8 FL    MCH 25.6 (L) 26.1 - 32.9 PG    MCHC 30.9 (L) 31.4 - 35.0 g/dL    RDW 17.9 (H) 11.9 - 14.6 %    PLATELET 624 272 - 604 K/uL    MPV 9.1 (L) 9.4 - 12.3 FL    ABSOLUTE NRBC 0.00 0.0 - 0.2 K/uL    DF AUTOMATED      NEUTROPHILS 79 (H) 43 - 78 %    LYMPHOCYTES 11 (L) 13 - 44 %    MONOCYTES 8 4.0 - 12.0 %    EOSINOPHILS 1 0.5 - 7.8 %    BASOPHILS 1 0.0 - 2.0 %    IMMATURE GRANULOCYTES 1 0.0 - 5.0 %    ABS. NEUTROPHILS 11.9 (H) 1.7 - 8.2 K/UL    ABS. LYMPHOCYTES 1.6 0.5 - 4.6 K/UL    ABS. MONOCYTES 1.2 0.1 - 1.3 K/UL    ABS. EOSINOPHILS 0.1 0.0 - 0.8 K/UL    ABS. BASOPHILS 0.1 0.0 - 0.2 K/UL    ABS. IMM.  GRANS. 0.1 0.0 - 0.5 K/UL   METABOLIC PANEL, COMPREHENSIVE    Collection Time: 03/15/21  5:08 PM   Result Value Ref Range    Sodium 132 (L) 136 - 145 mmol/L    Potassium 9.5 (HH) 3.5 - 5.1 mmol/L    Chloride 104 98 - 107 mmol/L    CO2 26 21 - 32 mmol/L    Anion gap 2 (L) 7 - 16 mmol/L    Glucose 78 65 - 100 mg/dL    BUN 13 6 - 23 MG/DL    Creatinine 0.80 0.6 - 1.0 MG/DL    GFR est AA >60 >60 ml/min/1.73m2    GFR est non-AA >60 >60 ml/min/1.73m2    Calcium 8.4 8.3 - 10.4 MG/DL    Bilirubin, total 0.5 0.2 - 1.1 MG/DL    ALT (SGPT) 33 12 - 65 U/L    AST (SGOT) 114 (H) 15 - 37 U/L    Alk.  phosphatase 134 50 - 136 U/L    Protein, total 8.8 (H) 6.3 - 8.2 g/dL    Albumin 2.7 (L) 3.5 - 5.0 g/dL    Globulin 6.1 (H) 2.3 - 3.5 g/dL    A-G Ratio 0.4 (L) 1.2 - 3.5     TROPONIN-HIGH SENSITIVITY    Collection Time: 03/15/21  5:08 PM   Result Value Ref Range    Troponin-High Sensitivity 51.5 (H) 0 - 14 pg/mL   TROPONIN-HIGH SENSITIVITY    Collection Time: 03/15/21  6:40 PM   Result Value Ref Range    Troponin-High Sensitivity 56.4 (H) 0 - 14 pg/mL   NT-PRO BNP    Collection Time: 03/15/21  6:40 PM   Result Value Ref Range    NT pro- (H) 5 - 125 PG/ML   POTASSIUM    Collection Time: 03/15/21  6:40 PM   Result Value Ref Range    Potassium 4.1 3.5 - 5.1 mmol/L   DRUG SCREEN, URINE    Collection Time: 03/15/21  8:12 PM   Result Value Ref Range    PCP(PHENCYCLIDINE) Negative      BENZODIAZEPINES Negative      COCAINE Negative      AMPHETAMINES Negative      METHADONE Negative      THC (TH-CANNABINOL) Negative      OPIATES Positive      BARBITURATES Negative     URINALYSIS W/ RFLX MICROSCOPIC    Collection Time: 03/15/21  8:12 PM   Result Value Ref Range    Color YELLOW      Appearance CLEAR      Specific gravity 1.010 1.001 - 1.023      pH (UA) 7.5 5.0 - 9.0      Protein Negative NEG mg/dL    Glucose Negative mg/dL    Ketone Negative NEG mg/dL    Bilirubin Negative NEG      Blood Negative NEG      Urobilinogen 2.0 (H) 0.2 - 1.0 EU/dL    Nitrites Negative NEG      Leukocyte Esterase Negative NEG     HEPARIN XA UFH    Collection Time: 03/15/21 9:31 PM   Result Value Ref Range    Heparin Xa UFH <0.10 (L) 0.3 - 0.7 IU/mL   PTT    Collection Time: 03/15/21 11:25 PM   Result Value Ref Range    aPTT >200.0 (HH) 24.1 - 35.1 SEC   CBC WITH AUTOMATED DIFF    Collection Time: 03/15/21 11:25 PM   Result Value Ref Range    WBC 14.4 (H) 4.3 - 11.1 K/uL    RBC 4.26 4.05 - 5.2 M/uL    HGB 11.0 (L) 11.7 - 15.4 g/dL    HCT 34.9 (L) 35.8 - 46.3 %    MCV 81.9 79.6 - 97.8 FL    MCH 25.8 (L) 26.1 - 32.9 PG    MCHC 31.5 31.4 - 35.0 g/dL    RDW 17.5 (H) 11.9 - 14.6 %    PLATELET 116 915 - 414 K/uL    MPV 9.8 9.4 - 12.3 FL    ABSOLUTE NRBC 0.00 0.0 - 0.2 K/uL    DF AUTOMATED      NEUTROPHILS 76 43 - 78 %    LYMPHOCYTES 15 13 - 44 %    MONOCYTES 7 4.0 - 12.0 %    EOSINOPHILS 1 0.5 - 7.8 %    BASOPHILS 0 0.0 - 2.0 %    IMMATURE GRANULOCYTES 0 0.0 - 5.0 %    ABS. NEUTROPHILS 10.9 (H) 1.7 - 8.2 K/UL    ABS. LYMPHOCYTES 2.2 0.5 - 4.6 K/UL    ABS. MONOCYTES 1.0 0.1 - 1.3 K/UL    ABS. EOSINOPHILS 0.1 0.0 - 0.8 K/UL    ABS. BASOPHILS 0.1 0.0 - 0.2 K/UL    ABS. IMM.  GRANS. 0.1 0.0 - 0.5 K/UL   HEPARIN XA UFH    Collection Time: 03/15/21 11:25 PM   Result Value Ref Range    Heparin Xa UFH >1.1 (H) 0.3 - 0.7 IU/mL   METABOLIC PANEL, BASIC    Collection Time: 03/16/21  4:19 AM   Result Value Ref Range    Sodium 139 136 - 145 mmol/L    Potassium 3.9 3.5 - 5.1 mmol/L    Chloride 108 (H) 98 - 107 mmol/L    CO2 26 21 - 32 mmol/L    Anion gap 5 (L) 7 - 16 mmol/L    Glucose 87 65 - 100 mg/dL    BUN 11 6 - 23 MG/DL    Creatinine 0.70 0.6 - 1.0 MG/DL    GFR est AA >60 >60 ml/min/1.73m2    GFR est non-AA >60 >60 ml/min/1.73m2    Calcium 8.8 8.3 - 10.4 MG/DL   CBC WITH AUTOMATED DIFF    Collection Time: 03/16/21  4:19 AM   Result Value Ref Range    WBC 13.5 (H) 4.3 - 11.1 K/uL    RBC 4.22 4.05 - 5.2 M/uL    HGB 10.8 (L) 11.7 - 15.4 g/dL    HCT 34.1 (L) 35.8 - 46.3 %    MCV 80.8 79.6 - 97.8 FL    MCH 25.6 (L) 26.1 - 32.9 PG    MCHC 31.7 31.4 - 35.0 g/dL    RDW 17.3 (H) 11.9 - 14.6 %    PLATELET 836 150 - 450 K/uL    MPV 9.5 9.4 - 12.3 FL    ABSOLUTE NRBC 0.00 0.0 - 0.2 K/uL    DF AUTOMATED      NEUTROPHILS 75 43 - 78 %    LYMPHOCYTES 16 13 - 44 %    MONOCYTES 7 4.0 - 12.0 %    EOSINOPHILS 1 0.5 - 7.8 %    BASOPHILS 1 0.0 - 2.0 %    IMMATURE GRANULOCYTES 0 0.0 - 5.0 %    ABS. NEUTROPHILS 10.1 (H) 1.7 - 8.2 K/UL    ABS. LYMPHOCYTES 2.1 0.5 - 4.6 K/UL    ABS. MONOCYTES 1.0 0.1 - 1.3 K/UL    ABS. EOSINOPHILS 0.1 0.0 - 0.8 K/UL    ABS. BASOPHILS 0.1 0.0 - 0.2 K/UL    ABS. IMM. GRANS. 0.1 0.0 - 0.5 K/UL   HEPARIN XA UFH    Collection Time: 03/16/21  4:19 AM   Result Value Ref Range    Heparin Xa UFH 0.36 0.3 - 0.7 IU/mL         Imaging /Procedures /Studies   All diagnostic imaging personally reviewed by me. CT chest with contrast:  Findings: The base of the neck is unremarkable in appearance. No clear adenopathy is seen  although there are asymmetric with prominent left axillary lymph nodes without  clear dysmorphism. These could be reactive. The thoracic aorta is normal in  caliber. Opacification of the pulmonary arteries is adequate. Extensive  bilateral pulmonary emboli are seen. These are seen extending from the distal  left main pulmonary artery into the left lower lobe and left upper lobe. In  addition, this is seen within right upper and lower lobe pulmonary arteries.       Evaluation with lung windows demonstrates left upper lobe infiltrate best  appreciated on axial image 33. Given the patient's known pulmonary emboli, this  is suspicious for a pulmonary infarction. Otherwise, mild dependent atelectatic  appearing changes are seen. A small dependent left pleural effusion is seen. Lungs are expanded without evidence for pneumothorax. No acute osseous  abnormality is seen.       Limited evaluation of the upper abdomen demonstrates no acute abnormality. The  distal tip of what appears to be an IVC filter is seen.  The patient appears to  be status post gastric bypass surgery.     IMPRESSION  1. Bilateral pulmonary emboli with emboli extending from the distal left main  pulmonary artery into arteries of the left upper and lower lobes and additional  more distal emboli in the right upper and lower lobes.      2. Left upper lobe airspace changes and small dependent left pleural effusion  likely representing reactive changes from pulmonary emboli. Specifically,  airspace changes in the left upper lobe could represent a developing infarction.     3. Prominent left axillary lymph nodes. These are not clearly dysmorphic and  favored to be reactive. Specifically, this can be seen in the setting of a  recent vaccine. ASSESSMENT      Hospital Problems as of 3/16/2021 Date Reviewed: 12/20/2018          Codes Class Noted - Resolved POA    * (Principal) Bilateral pulmonary embolism ([facility] ICD-10-CM: I26.99  ICD-9-CM: 415.19  3/15/2021 - Present Yes        Leukocytosis ICD-10-CM: D72.829  ICD-9-CM: 288.60  3/15/2021 - Present Yes        Normocytic anemia ICD-10-CM: D64.9  ICD-9-CM: 285.9  3/15/2021 - Present Yes        Hyponatremia ICD-10-CM: E87.1  ICD-9-CM: 276.1  3/15/2021 - Present Yes        Hyperlipidemia (Chronic) ICD-10-CM: E78.5  ICD-9-CM: 272.4  3/15/2013 - Present Yes        IAN (obstructive sleep apnea) (Chronic) ICD-10-CM: G47.33  ICD-9-CM: 327.23  3/15/2013 - Present Yes        Asthma ICD-10-CM: J45.909  ICD-9-CM: 493.90  3/15/2013 - Present Yes        Schizophrenia ([facility] ICD-10-CM: F20.9  ICD-9-CM: 295.90  3/15/2013 - Present Yes        Morbid obesity ([facility] ICD-10-CM: E66.01  ICD-9-CM: 278.01  3/5/2013 - Present Yes        Bipolar 1 disorder ([facility] (Chronic) ICD-10-CM: F31.9  ICD-9-CM: 296.7  3/5/2013 - Present Yes        HTN (hypertension) (Chronic) ICD-10-CM: I10  ICD-9-CM: 401.9  3/5/2013 - Present Yes                  Plan:  #Acute bilateral PE with acute hypoxic respiratory failure:  3/16:  Patient currently on IV heparin. IR consulted to evaluate for catheter mediated thrombolysis.   Continue supplemental oxygen to titrate SPO2 greater than 92%. Added Xopenex nebs every 4 hours RT. #Acute hypoxic respiratory failure:  3/16: Continue supplemental oxygen to titrate SPO2 to 90%. Added Xopenex nebs every 4 hours RT. #Sinus tachycardia:  3/16: Likely hypoxia driven  Continue diltiazem  mg daily  Use as needed Lopressor IV given for milligrams every 6 hours for HR greater than 150    #Bipolar/depression:  3/16:  Continue Elavil 150 mg p.o. nightly along with Seroquel 400 mg p.o. twice daily  Xanax 2 mg 3 times daily as needed  Ambien as needed for insomnia  GI prophylaxis with Protonix    DVT Prophylaxis: Heparin drip  High risk with Norco on board.   Disposition: Pending clinical course  Janis Saucedo MD

## 2021-03-16 NOTE — PROGRESS NOTES
Problem: Falls - Risk of  Goal: *Absence of Falls  Description: Document Salazar Hartman Fall Risk and appropriate interventions in the flowsheet.   Outcome: Progressing Towards Goal  Note: Fall Risk Interventions:  Mobility Interventions: Communicate number of staff needed for ambulation/transfer, Bed/chair exit alarm         Medication Interventions: Patient to call before getting OOB    Elimination Interventions: Bed/chair exit alarm, Call light in reach, Patient to call for help with toileting need  Problem: Patient Education: Go to Patient Education Activity  Goal: Patient/Family Education  Outcome: Progressing Towards Goal

## 2021-03-16 NOTE — PROGRESS NOTES
Department of Interventional Radiology  [phone]        Interventional Radiology Brief Procedure Note    Patient: Flores Wilson MRN: [id number]  SSN: [id number]    YOB: 1970  Age: 44 y.o. Sex: female      Date of Procedure: 3/16/2021    Pre-Procedure Diagnosis: SOB, small pulmonary infarct, L >> R PE. Chronic Bi L > R LE swelling--improved w overnight elevation. IVC Filter placed 2013. Patent IVC 2015. Post-Procedure Diagnosis: SAME    Procedure(s): Pulmonary arteriogram w initiation of tPA thrombolysis. Brief Description of Procedure: RIJV access. Performed By: Faythe Kanner, MD     Assistants: None    Anesthesia:Moderate Sedation    Estimated Blood Loss: Less than 10ml    Specimens:  None    Implants:   None. Findings: LLL PE. No clot above the IVC Filter. Complications: None    Recommendations: Heparin 300 units/hr. tPA 1 mg/hr. NPO after MN. LE venous US tonight/tomorrow. She may need additional venous intervention this admission. Follow Up: Anticipate catheter removal tomorrow. Office follow up in 1-2 months.       Signed By: Faythe Kanner, MD     March 16, 2021

## 2021-03-17 ENCOUNTER — APPOINTMENT (OUTPATIENT)
Dept: INTERVENTIONAL RADIOLOGY/VASCULAR | Age: 51
DRG: 166 | End: 2021-03-17
Attending: RADIOLOGY
Payer: MEDICARE

## 2021-03-17 PROBLEM — I82.409 DVT (DEEP VENOUS THROMBOSIS) (HCC): Status: ACTIVE | Noted: 2021-03-17

## 2021-03-17 LAB
ANION GAP SERPL CALC-SCNC: 5 MMOL/L (ref 7–16)
APTT PPP: 35.9 SEC (ref 24.1–35.1)
APTT PPP: 36.3 SEC (ref 24.1–35.1)
BASOPHILS # BLD: 0 K/UL (ref 0–0.2)
BASOPHILS # BLD: 0.1 K/UL (ref 0–0.2)
BASOPHILS NFR BLD: 0 % (ref 0–2)
BASOPHILS NFR BLD: 1 % (ref 0–2)
BUN SERPL-MCNC: 8 MG/DL (ref 6–23)
CALCIUM SERPL-MCNC: 8.3 MG/DL (ref 8.3–10.4)
CHLORIDE SERPL-SCNC: 110 MMOL/L (ref 98–107)
CO2 SERPL-SCNC: 25 MMOL/L (ref 21–32)
CREAT SERPL-MCNC: 0.61 MG/DL (ref 0.6–1)
DIFFERENTIAL METHOD BLD: ABNORMAL
DIFFERENTIAL METHOD BLD: ABNORMAL
EOSINOPHIL # BLD: 0.1 K/UL (ref 0–0.8)
EOSINOPHIL # BLD: 0.1 K/UL (ref 0–0.8)
EOSINOPHIL NFR BLD: 1 % (ref 0.5–7.8)
EOSINOPHIL NFR BLD: 1 % (ref 0.5–7.8)
ERYTHROCYTE [DISTWIDTH] IN BLOOD BY AUTOMATED COUNT: 17.3 % (ref 11.9–14.6)
ERYTHROCYTE [DISTWIDTH] IN BLOOD BY AUTOMATED COUNT: 17.4 % (ref 11.9–14.6)
FIBRINOGEN PPP-MCNC: 653 MG/DL (ref 190–501)
FIBRINOGEN PPP-MCNC: 653 MG/DL (ref 190–501)
GLUCOSE SERPL-MCNC: 87 MG/DL (ref 65–100)
HCT VFR BLD AUTO: 30.9 % (ref 35.8–46.3)
HCT VFR BLD AUTO: 31.4 % (ref 35.8–46.3)
HGB BLD-MCNC: 9.7 G/DL (ref 11.7–15.4)
HGB BLD-MCNC: 9.7 G/DL (ref 11.7–15.4)
IMM GRANULOCYTES # BLD AUTO: 0 K/UL (ref 0–0.5)
IMM GRANULOCYTES # BLD AUTO: 0 K/UL (ref 0–0.5)
IMM GRANULOCYTES NFR BLD AUTO: 0 % (ref 0–5)
IMM GRANULOCYTES NFR BLD AUTO: 0 % (ref 0–5)
INR PPP: 1.1
LYMPHOCYTES # BLD: 1.1 K/UL (ref 0.5–4.6)
LYMPHOCYTES # BLD: 1.3 K/UL (ref 0.5–4.6)
LYMPHOCYTES NFR BLD: 12 % (ref 13–44)
LYMPHOCYTES NFR BLD: 14 % (ref 13–44)
MCH RBC QN AUTO: 25.7 PG (ref 26.1–32.9)
MCH RBC QN AUTO: 26.2 PG (ref 26.1–32.9)
MCHC RBC AUTO-ENTMCNC: 30.9 G/DL (ref 31.4–35)
MCHC RBC AUTO-ENTMCNC: 31.4 G/DL (ref 31.4–35)
MCV RBC AUTO: 83.3 FL (ref 79.6–97.8)
MCV RBC AUTO: 83.5 FL (ref 79.6–97.8)
MONOCYTES # BLD: 0.9 K/UL (ref 0.1–1.3)
MONOCYTES # BLD: 1 K/UL (ref 0.1–1.3)
MONOCYTES NFR BLD: 10 % (ref 4–12)
MONOCYTES NFR BLD: 11 % (ref 4–12)
NEUTS SEG # BLD: 6.8 K/UL (ref 1.7–8.2)
NEUTS SEG # BLD: 7.4 K/UL (ref 1.7–8.2)
NEUTS SEG NFR BLD: 74 % (ref 43–78)
NEUTS SEG NFR BLD: 77 % (ref 43–78)
NRBC # BLD: 0 K/UL (ref 0–0.2)
NRBC # BLD: 0 K/UL (ref 0–0.2)
PLATELET # BLD AUTO: 221 K/UL (ref 150–450)
PLATELET # BLD AUTO: 224 K/UL (ref 150–450)
PMV BLD AUTO: 10.1 FL (ref 9.4–12.3)
PMV BLD AUTO: 9.3 FL (ref 9.4–12.3)
POTASSIUM SERPL-SCNC: 3.9 MMOL/L (ref 3.5–5.1)
PROTHROMBIN TIME: 14.8 SEC (ref 12.5–14.7)
RBC # BLD AUTO: 3.7 M/UL (ref 4.05–5.2)
RBC # BLD AUTO: 3.77 M/UL (ref 4.05–5.2)
SODIUM SERPL-SCNC: 140 MMOL/L (ref 136–145)
WBC # BLD AUTO: 9.1 K/UL (ref 4.3–11.1)
WBC # BLD AUTO: 9.6 K/UL (ref 4.3–11.1)

## 2021-03-17 PROCEDURE — 74011250637 HC RX REV CODE- 250/637: Performed by: INTERNAL MEDICINE

## 2021-03-17 PROCEDURE — 77030038269 HC DRN EXT URIN PURWCK BARD -A

## 2021-03-17 PROCEDURE — 85730 THROMBOPLASTIN TIME PARTIAL: CPT

## 2021-03-17 PROCEDURE — 74011250636 HC RX REV CODE- 250/636: Performed by: RADIOLOGY

## 2021-03-17 PROCEDURE — 74011000250 HC RX REV CODE- 250: Performed by: RADIOLOGY

## 2021-03-17 PROCEDURE — 77030010507 HC ADH SKN DERMBND J&J -B

## 2021-03-17 PROCEDURE — 85384 FIBRINOGEN ACTIVITY: CPT

## 2021-03-17 PROCEDURE — B5191ZZ FLUOROSCOPY OF INFERIOR VENA CAVA USING LOW OSMOLAR CONTRAST: ICD-10-PCS | Performed by: RADIOLOGY

## 2021-03-17 PROCEDURE — 85025 COMPLETE CBC W/AUTO DIFF WBC: CPT

## 2021-03-17 PROCEDURE — 74011000250 HC RX REV CODE- 250: Performed by: HOSPITALIST

## 2021-03-17 PROCEDURE — 75822 VEIN X-RAY ARMS/LEGS: CPT

## 2021-03-17 PROCEDURE — 74011000636 HC RX REV CODE- 636: Performed by: RADIOLOGY

## 2021-03-17 PROCEDURE — 94640 AIRWAY INHALATION TREATMENT: CPT

## 2021-03-17 PROCEDURE — 74011000250 HC RX REV CODE- 250: Performed by: INTERNAL MEDICINE

## 2021-03-17 PROCEDURE — 80048 BASIC METABOLIC PNL TOTAL CA: CPT

## 2021-03-17 PROCEDURE — 36011 PLACE CATHETER IN VEIN: CPT

## 2021-03-17 PROCEDURE — C1769 GUIDE WIRE: HCPCS

## 2021-03-17 PROCEDURE — 77030025126 HC CATH ANGI DX SUPR TORQ CARD -B

## 2021-03-17 PROCEDURE — 2709999900 HC NON-CHARGEABLE SUPPLY

## 2021-03-17 PROCEDURE — 85610 PROTHROMBIN TIME: CPT

## 2021-03-17 PROCEDURE — 74011250637 HC RX REV CODE- 250/637: Performed by: RADIOLOGY

## 2021-03-17 PROCEDURE — 06PY3DZ REMOVAL OF INTRALUMINAL DEVICE FROM LOWER VEIN, PERCUTANEOUS APPROACH: ICD-10-PCS | Performed by: RADIOLOGY

## 2021-03-17 PROCEDURE — 65660000000 HC RM CCU STEPDOWN

## 2021-03-17 PROCEDURE — 94760 N-INVAS EAR/PLS OXIMETRY 1: CPT

## 2021-03-17 PROCEDURE — 77010033678 HC OXYGEN DAILY

## 2021-03-17 PROCEDURE — 74011250637 HC RX REV CODE- 250/637: Performed by: FAMILY MEDICINE

## 2021-03-17 RX ORDER — MIDAZOLAM HYDROCHLORIDE 1 MG/ML
.5-2 INJECTION, SOLUTION INTRAMUSCULAR; INTRAVENOUS
Status: DISCONTINUED | OUTPATIENT
Start: 2021-03-17 | End: 2021-03-17

## 2021-03-17 RX ORDER — SODIUM CHLORIDE 9 MG/ML
500 INJECTION, SOLUTION INTRAVENOUS CONTINUOUS
Status: DISCONTINUED | OUTPATIENT
Start: 2021-03-17 | End: 2021-03-17

## 2021-03-17 RX ORDER — FENTANYL CITRATE 50 UG/ML
25-100 INJECTION, SOLUTION INTRAMUSCULAR; INTRAVENOUS
Status: DISCONTINUED | OUTPATIENT
Start: 2021-03-17 | End: 2021-03-17

## 2021-03-17 RX ORDER — HEPARIN SODIUM 200 [USP'U]/100ML
1000-8000 INJECTION, SOLUTION INTRAVENOUS CONTINUOUS
Status: DISCONTINUED | OUTPATIENT
Start: 2021-03-17 | End: 2021-03-17

## 2021-03-17 RX ORDER — HYDROCHLOROTHIAZIDE 25 MG/1
25 TABLET ORAL DAILY
Status: DISCONTINUED | OUTPATIENT
Start: 2021-03-18 | End: 2021-03-19 | Stop reason: HOSPADM

## 2021-03-17 RX ORDER — IBUPROFEN 200 MG
1 TABLET ORAL EVERY 24 HOURS
Status: DISCONTINUED | OUTPATIENT
Start: 2021-03-17 | End: 2021-03-19 | Stop reason: HOSPADM

## 2021-03-17 RX ORDER — LABETALOL HYDROCHLORIDE 5 MG/ML
20 INJECTION, SOLUTION INTRAVENOUS
Status: DISCONTINUED | OUTPATIENT
Start: 2021-03-17 | End: 2021-03-19 | Stop reason: HOSPADM

## 2021-03-17 RX ORDER — LIDOCAINE HYDROCHLORIDE 20 MG/ML
20-300 INJECTION, SOLUTION INFILTRATION; PERINEURAL
Status: DISCONTINUED | OUTPATIENT
Start: 2021-03-17 | End: 2021-03-17

## 2021-03-17 RX ADMIN — PANTOPRAZOLE SODIUM 40 MG: 40 TABLET, DELAYED RELEASE ORAL at 16:55

## 2021-03-17 RX ADMIN — HEPARIN SODIUM 2000 UNITS: 200 INJECTION, SOLUTION INTRAVENOUS at 15:45

## 2021-03-17 RX ADMIN — HYDROCODONE BITARTRATE AND ACETAMINOPHEN 1 TABLET: 10; 325 TABLET ORAL at 03:41

## 2021-03-17 RX ADMIN — SODIUM CHLORIDE 125 ML/HR: 900 INJECTION, SOLUTION INTRAVENOUS at 18:18

## 2021-03-17 RX ADMIN — MIDAZOLAM 1 MG: 1 INJECTION INTRAMUSCULAR; INTRAVENOUS at 15:32

## 2021-03-17 RX ADMIN — ALTEPLASE 1 MG/HR: 2.2 INJECTION, POWDER, LYOPHILIZED, FOR SOLUTION INTRAVENOUS at 02:59

## 2021-03-17 RX ADMIN — OXYCODONE 5 MG: 5 TABLET ORAL at 16:55

## 2021-03-17 RX ADMIN — Medication 5 ML: at 21:23

## 2021-03-17 RX ADMIN — LEVALBUTEROL HYDROCHLORIDE 0.63 MG: 0.63 SOLUTION RESPIRATORY (INHALATION) at 03:49

## 2021-03-17 RX ADMIN — LABETALOL HYDROCHLORIDE 20 MG: 5 INJECTION INTRAVENOUS at 13:14

## 2021-03-17 RX ADMIN — LEVALBUTEROL HYDROCHLORIDE 0.63 MG: 0.63 SOLUTION RESPIRATORY (INHALATION) at 11:28

## 2021-03-17 RX ADMIN — HEPARIN SODIUM 300 UNITS/HR: 5000 INJECTION, SOLUTION INTRAVENOUS at 01:50

## 2021-03-17 RX ADMIN — PANTOPRAZOLE SODIUM 40 MG: 40 TABLET, DELAYED RELEASE ORAL at 05:45

## 2021-03-17 RX ADMIN — Medication 10 ML: at 16:35

## 2021-03-17 RX ADMIN — SODIUM CHLORIDE 125 ML/HR: 900 INJECTION, SOLUTION INTRAVENOUS at 07:30

## 2021-03-17 RX ADMIN — ZOLPIDEM TARTRATE 5 MG: 5 TABLET, COATED ORAL at 21:23

## 2021-03-17 RX ADMIN — IOPAMIDOL 60 ML: 612 INJECTION, SOLUTION INTRAVENOUS at 15:45

## 2021-03-17 RX ADMIN — DILTIAZEM HYDROCHLORIDE 120 MG: 120 CAPSULE, COATED, EXTENDED RELEASE ORAL at 10:08

## 2021-03-17 RX ADMIN — FENTANYL CITRATE 25 MCG: 50 INJECTION, SOLUTION INTRAMUSCULAR; INTRAVENOUS at 15:41

## 2021-03-17 RX ADMIN — FENTANYL CITRATE 50 MCG: 50 INJECTION, SOLUTION INTRAMUSCULAR; INTRAVENOUS at 15:32

## 2021-03-17 RX ADMIN — LABETALOL HYDROCHLORIDE 20 MG: 5 INJECTION, SOLUTION INTRAVENOUS at 23:43

## 2021-03-17 RX ADMIN — AMITRIPTYLINE HYDROCHLORIDE 150 MG: 50 TABLET, FILM COATED ORAL at 21:23

## 2021-03-17 RX ADMIN — MIDAZOLAM 0.5 MG: 1 INJECTION INTRAMUSCULAR; INTRAVENOUS at 15:41

## 2021-03-17 RX ADMIN — SODIUM CHLORIDE 500 ML: 900 INJECTION, SOLUTION INTRAVENOUS at 15:32

## 2021-03-17 RX ADMIN — LEVALBUTEROL HYDROCHLORIDE 0.63 MG: 0.63 SOLUTION RESPIRATORY (INHALATION) at 08:58

## 2021-03-17 RX ADMIN — APIXABAN 10 MG: 5 TABLET, FILM COATED ORAL at 18:28

## 2021-03-17 RX ADMIN — ALPRAZOLAM 2 MG: 0.5 TABLET ORAL at 07:35

## 2021-03-17 RX ADMIN — HYDROCODONE BITARTRATE AND ACETAMINOPHEN 1 TABLET: 10; 325 TABLET ORAL at 13:24

## 2021-03-17 RX ADMIN — LIDOCAINE HYDROCHLORIDE 80 MG: 20 INJECTION, SOLUTION INFILTRATION; PERINEURAL at 15:35

## 2021-03-17 RX ADMIN — LEVALBUTEROL HYDROCHLORIDE 0.63 MG: 0.63 SOLUTION RESPIRATORY (INHALATION) at 23:49

## 2021-03-17 RX ADMIN — Medication 10 ML: at 05:45

## 2021-03-17 NOTE — PROGRESS NOTES
Interdisciplinary team rounds were held 3/17/2021 with the following team members:Care Management, Nursing, Nutrition, Pastoral Care, Pharmacy, Physical Therapy, Physician, Respiratory Therapy and Clinical Coordinator. Plan of care discussed. See clinical pathway and/or care plan for interventions and desired outcomes.

## 2021-03-17 NOTE — PROGRESS NOTES
Department of Interventional Radiology  [phone]        Note     Patient: Wilfredo Avalos MRN: [id number]  SSN: [id number]    YOB: 1970  Age: 44 y.o. Sex: female      Consult Date: 3/17/2021          Breathing much better today, feels much better. Discussed pelvic/abdominal diagnostic venogram to assess extent of clot, stenosis, etc.  Questions answered. She wishes to proceed. Will remove RIJV access afterward.     Tania Simons MD          Past Medical History:   Diagnosis Date    Anemia     no supplements     Anxiety     Managed with meds     Asthma     none since stopped smoking-- 6 months ago, Inhalers prn     Benzodiazepine (tranquilizer) overdose 20 yrs ago    BMI 36.0-36.9,adult     Chronic pain     back, knees    Depression     medication    GERD (gastroesophageal reflux disease)     controlled with medication    Hypertension     controlled with med    Obese     bmi =35    IAN (obstructive sleep apnea)     DOES NOT WEAR CPAP    Panic attacks     S/P gastric bypass 2013    wt loss 60lbs-- gained all back    Schizophrenia ([address]     Seizures ([address]     one seizure several yrs ago- undetermined cause     Suicide, attempted 20 YRS AGO    Thromboembolus ([address] 2010    left leg-- IVF PLACED    Thyroid disease     HYPOTHYROIDISM     Past Surgical History:   Procedure Laterality Date    COLONOSCOPY N/A 3/7/2018    COLONOSCOPY  BMI 39 performed by Lori Griffith [facility] at [facility] ENDOSCOPY    HX BREAST REDUCTION      HX GASTRECTOMY  3/4/13    Sleeve    HX HYSTERECTOMY      HX OOPHORECTOMY      HX TUBAL LIGATION      HX VASCULAR ACCESS  2010    IVF PLACED    IR THROMBOLYSIS TRANSCATH NON CORONARY OR INTRACRANIAL  3/16/2021      Family History   Problem Relation Age of Onset    Cancer Father     Breast Cancer Neg Hx      Social History     Tobacco Use    Smoking status: Former Smoker     Packs/day: 0.25     Years: 12.00     Pack years: 3.00     Quit date: 6/3/2018     Years since quittin.7    Smokeless tobacco: Never Used    Tobacco comment: current social smoker (noted 18)   Substance Use Topics    Alcohol use:  Yes     Alcohol/week: 0.0 standard drinks     Comment: occ      Allergies   Allergen Reactions    Ace Inhibitors Anaphylaxis    Lisinopril Swelling     Lip/Mouth swelling     Current Facility-Administered Medications   Medication Dose Route Frequency Provider Last Rate Last Admin    nicotine (NICODERM CQ) 14 mg/24 hr patch 1 Patch  1 Patch TransDERmal Q24H Rahul Mckay MD        [START ON 3/18/2021] hydroCHLOROthiazide (HYDRODIURIL) tablet 25 mg  25 mg Oral DAILY Rahul Mckay MD        metoprolol (LOPRESSOR) injection 2.5 mg  2.5 mg IntraVENous Q6H PRN Dorothy Dye MD   2.5 mg at 21 1553    levalbuterol (XOPENEX) nebulizer soln 0.63 mg/3 mL  0.63 mg Nebulization Q4H RT Dorothy yDe MD   0.63 mg at 21 1128    alteplase (CATHFLO) 10 mg in 0.9% sodium chloride 500 mL infusion  1 mg/hr IntraCATHeter- VENous CONTINUOUS Sherif Kelly MD 50 mL/hr at 21 0711 1 mg/hr at 21 0711    acetaminophen (TYLENOL) tablet 650 mg  650 mg Oral Q4H PRN Sherif Kelly MD        oxyCODONE IR (ROXICODONE) tablet 5 mg  5 mg Oral Q4H PRN Sherif Kelly MD        HYDROmorphone (DILAUDID) injection 1 mg  1 mg IntraVENous Q1H PRN Sherif Kelly MD        [facility] with saline injection 12.5 mg  12.5 mg IntraVENous Q4H PRN Sherif Kelly MD        LORazepam (ATIVAN) injection 1 mg  1 mg IntraVENous Q2H PRJENNIFER Kelly MD        zolpidem (AMBIEN) tablet 5 mg  5 mg Oral QHS PRN Sherif Kelly MD        heparin 25,000 units in dextrose 500 mL infusion  300 Units/hr IntraVENous CONTINUOUS Sherif Kelly MD 6 mL/hr at 21 0710 300 Units/hr at 21 0710    0.9% sodium chloride infusion  35 mL/hr IntraCATHeter- VENous CONTINUOUS Evonnesie Johan MD BRANDON 35 mL/hr at 03/16/21 1525 35 mL/hr at 03/16/21 1525    0.9% sodium chloride infusion  125 mL/hr IntraCATHeter- VENous CONTINUOUS Jovita Welsh  mL/hr at 03/17/21 0730 125 mL/hr at 03/17/21 0730    labetaloL (NORMODYNE;TRANDATE) injection 20 mg  20 mg IntraVENous Q6H PRN Chema Aguayo MD   20 mg at 03/17/21 1314    ALPRAZolam (XANAX) tablet 2 mg  2 mg Oral TID PRN Jess Washington MD   2 mg at 03/17/21 0735    amitriptyline (ELAVIL) tablet 150 mg  150 mg Oral QHS Jess Washington MD   150 mg at 03/16/21 2135    dilTIAZem ER (CARDIZEM CD) capsule 120 mg  120 mg Oral DAILY Jess Washington MD   120 mg at 03/17/21 1008    pantoprazole (PROTONIX) tablet 40 mg  40 mg Oral ACB&D Jess Washington MD   40 mg at 03/17/21 0545    zolpidem (AMBIEN) tablet 5 mg  5 mg Oral QHS Jess Washington MD   5 mg at 03/16/21 2135    sodium chloride (NS) flush 5-40 mL  5-40 mL IntraVENous Q8H Jess Washington MD   10 mL at 03/17/21 0545    sodium chloride (NS) flush 5-40 mL  5-40 mL IntraVENous PRN Jess Washington MD        HYDROcodone-acetaminophen [facility])  mg tablet 1 Tab  1 Tab Oral Q4H PRN Sabi Christine MD   1 Tab at 03/17/21 1324       Medications Prior to Admission   Medication Sig    ALPRAZolam (XANAX) 2 mg tablet Take 2 mg by mouth three (3) times daily as needed for Anxiety.  dilTIAZem (TIAZAC) 120 mg SR capsule Take 120 mg by mouth daily. Take / use AM day of surgery  per anesthesia protocols.  amitriptyline (ELAVIL) 150 mg tablet Take 150 mg by mouth nightly.  zolpidem (AMBIEN) 10 mg tablet Take 10 mg by mouth nightly.  dextroamphetamine-amphetamine (AdderalL) 15 mg tablet Take 15 mg by mouth two (2) times a day. Pt reported  Indications: attention deficit disorder with hyperactivity    lidocaine 4 % patch Apply one patch to low back for 12 hours daily then remove    omeprazole (PRILOSEC) 20 mg capsule Take 40 mg by mouth two (2) times a day.     QUEtiapine (SEROQUEL) 300 mg tablet Take 400 mg by mouth two (2) times a day. Takes both in evenings        Allergies   Allergen Reactions    Ace Inhibitors Anaphylaxis    Lisinopril Swelling     Lip/Mouth swelling     Objective:     Physical Exam:  Vitals:    03/17/21 1314 03/17/21 1316 03/17/21 1340 03/17/21 1407   BP: (!) 164/91 (!) 162/83 (!) 163/82 (!) 172/89   Pulse: (!) 110 (!) 108 91 93   Resp:    22   Temp:    98.2 °F (36.8 °C)   SpO2: 93% (!) 88% 93% 92%   Weight:    112.9 kg (249 lb)   Height:    5' 9\" (1.753 m)        Pain Assessment  Pain Intensity 1: 7 (03/17/21 1407)  Pain Location 1: Back  Pain Intervention(s) 1: Medication (see MAR)  Patient Stated Pain Goal: 0    Lab/Data Review: All lab results for the last 24 hours reviewed.       Assessment/:     Hospital Problems  Date Reviewed: 12/20/2018          Codes Class Noted POA    * (Principal) Bilateral pulmonary embolism ([facility] ICD-10-CM: I26.99  ICD-9-CM: 415.19  3/15/2021 Yes        Leukocytosis ICD-10-CM: D72.829  ICD-9-CM: 288.60  3/15/2021 Yes        Normocytic anemia ICD-10-CM: D64.9  ICD-9-CM: 285.9  3/15/2021 Yes        Hyponatremia ICD-10-CM: E87.1  ICD-9-CM: 276.1  3/15/2021 Yes        Hyperlipidemia (Chronic) ICD-10-CM: E78.5  ICD-9-CM: 272.4  3/15/2013 Yes        IAN (obstructive sleep apnea) (Chronic) ICD-10-CM: G47.33  ICD-9-CM: 327.23  3/15/2013 Yes        Asthma ICD-10-CM: J45.909  ICD-9-CM: 493.90  3/15/2013 Yes        Schizophrenia ([facility] ICD-10-CM: F20.9  ICD-9-CM: 295.90  3/15/2013 Yes        Morbid obesity ([facility] ICD-10-CM: E66.01  ICD-9-CM: 278.01  3/5/2013 Yes        Bipolar 1 disorder ([facility] (Chronic) ICD-10-CM: F31.9  ICD-9-CM: 296.7  3/5/2013 Yes        HTN (hypertension) (Chronic) ICD-10-CM: I10  ICD-9-CM: 401.9  3/5/2013 Yes

## 2021-03-17 NOTE — PROGRESS NOTES
[facility]ist Progress Note     Name:  Divya Jackson  Age:50 y.o. Sex:female   :  1970    MRN:  [id number]     Admit Date:  3/15/2021    Reason for Admission:  Bilateral pulmonary embolism [facility]) [I26.99]    Hospital Course/Interval history:     49-year-old -American female with history of obesity, bipolar 1, HDL, dyslipidemia, IAN, schizophrenia, prior DVT status post IVC filter admitted on 3/15 for acute hypoxic respiratory failure secondary to bilateral PE.    3/16 - to IR for EKOS    3/17 - remains in ICU on EKOS. Feels much better. Assessment & Plan     BL PE  LLE DVT  3/17  - EKOS per IR    Acute hypoxic resp failure  Secondary to above. - wean O2 as tolerated    HTN  - cont cardizem  3/17  - add HCTZ    Bipolar/depression  - cont home meds    Diet:  DIET NPO  DVT PPx: heparin  Code: Full Code  Dispo/Discharge Planning: home in 1-2 days when cleared by IR and off suppl O2      Subjective (21):    Pt reports feeling much better. Feels LLE swelling has improved as has her SOB. Thankful that she came to the ED when she did. Review of Systems: 14 point review of systems is otherwise negative with the exception of the elements mentioned above.     Objective:     Patient Vitals for the past 24 hrs:   Temp Pulse Resp BP SpO2   21 1316  (!) 108  (!) 162/83 (!) 88 %   21 1314  (!) 110  (!) 164/91    21 1301  (!) 105 27 (!) 164/91 92 %   21 1246  (!) 107 27 (!) 172/99 93 %   21 1231  (!) 105 24 (!) 167/94 (!) 88 %   21 1216  (!) 108 18 (!) 178/104 96 %   21 1201  (!) 107 22 (!) 173/98 94 %   21 1146  (!) 108 24 (!) 185/111 92 %   21 1131  (!) 103 28 (!) 159/82 96 %   21 1128     92 %   21 1116  (!) 109 22 (!) 167/91 (!) 88 %   21 1101  (!) 104 24 (!) 165/81 90 %   21 1046  (!) 104  (!) 177/93 90 %   21 1031  (!) 108  (!) 162/81 93 %   21 1016  (!) 104 15 (!) 158/84 95 %   03/17/21 1008  (!) 104  (!) 161/85    03/17/21 1001  (!) 104  (!) 161/85 95 %   03/17/21 0946  (!) 104  (!) 170/85 98 %   03/17/21 0931  (!) 103  (!) 167/88 95 %   03/17/21 0916  (!) 104 20 (!) 162/88 98 %   03/17/21 0901  (!) 102 22 (!) 166/93 100 %   03/17/21 0858     98 %   03/17/21 0846  (!) 104 16 (!) 163/87 96 %   03/17/21 0831  (!) 102 23 (!) 163/89 96 %   03/17/21 0816  (!) 101 24 (!) 161/88 95 %   03/17/21 0801  (!) 102 20 (!) 165/89 97 %   03/17/21 0746  (!) 101 21 (!) 168/88 95 %   03/17/21 0731 98.7 °F (37.1 °C) (!) 101 19 (!) 142/73 92 %   03/17/21 0616  (!) 101 (!) 32 (!) 162/99 94 %   03/17/21 0601  (!) 102 24 (!) 150/71 93 %   03/17/21 0547  (!) 104 19 (!) 166/79 92 %   03/17/21 0531  (!) 104 (!) 67 (!) 160/91 94 %   03/17/21 0516  (!) 103 20 (!) 153/82 94 %   03/17/21 0501  (!) 103 25 (!) 160/83 93 %   03/17/21 0446  (!) 103 20 (!) 150/75 92 %   03/17/21 0432  (!) 105 (!) 57 (!) 175/86 90 %   03/17/21 0416  (!) 112 30 (!) 158/96 (!) 87 %   03/17/21 0401  (!) 105 16 (!) 154/87 93 %   03/17/21 0346  (!) 104 21 (!) 166/93 99 %   03/17/21 0331  (!) 103 24 (!) 154/86 98 %   03/17/21 0316  (!) 104 27 (!) 153/82 97 %   03/17/21 0301  (!) 104 (!) 39 (!) 157/83 91 %   03/17/21 0300 99 °F (37.2 °C) (!) 104 25 (!) 157/83 92 %   03/17/21 0231  (!) 103 (!) 41 (!) 157/97 (!) 88 %   03/17/21 0216  (!) 101 26 (!) 156/92 93 %   03/17/21 0201  (!) 101 24 (!) 170/94 93 %   03/17/21 0146  100 28 (!) 155/94 95 %   03/17/21 0131  100 (!) 38 (!) 159/94 95 %   03/17/21 0116  100 (!) 40 (!) 158/93 95 %   03/17/21 0101  98 (!) 42 (!) 153/88 94 %   03/17/21 0031  100 (!) 36 (!) 152/83 94 %   03/17/21 0016  99 (!) 42 (!) 160/86 94 %   03/17/21 0002  100 (!) 35 (!) 137/100 93 %   03/16/21 2352     96 %   03/16/21 2346  97 (!) 42 (!) 158/91 97 %   03/16/21 2331  98 (!) 37 (!) 163/93 97 %   03/16/21 2316 99.2 °F (37.3 °C) 97 (!) 36 (!) 155/87 97 %   03/16/21 2301  97 (!) 33 (!) 151/87 97 %   03/16/21 2246  97 (!) 38 (!) 151/90 96 %   03/16/21 2231  97 (!) 38 (!) 149/91 96 %   03/16/21 2216  97 (!) 36 (!) 147/87 96 %   03/16/21 2206  97 (!) 39 (!) 154/93 95 %   03/16/21 2116  97 (!) 58 (!) 156/94 91 %   03/16/21 2100  93 21 (!) 162/96 94 %   03/16/21 2046  96 (!) 39 (!) 156/91 91 %   03/16/21 2016  91 27 (!) 167/94 97 %   03/16/21 2001  95 29 (!) 161/93 94 %   03/16/21 1916 98.8 °F (37.1 °C) (!) 107 (!) 38 (!) 160/106 96 %   03/16/21 1901  (!) 107 (!) 40 (!) 182/104 96 %   03/16/21 1803  (!) 106      03/16/21 1802 99 °F (37.2 °C) (!) 106 21 (!) 174/95 95 %   03/16/21 1801  (!) 105 23  96 %   03/16/21 1728    (!) 184/105 94 %   03/16/21 1712    (!) 171/91 94 %   03/16/21 1657    (!) 163/117 93 %   03/16/21 1628    (!) 159/112 90 %   03/16/21 1612  (!) 105 20 (!) 166/100 92 %   03/16/21 1553  (!) 115  (!) 188/96    03/16/21 1549  (!) 113 22 (!) 188/96 93 %   03/16/21 1527  (!) 112 25 (!) 190/99 93 %   03/16/21 1522  (!) 113 25 (!) 183/105 97 %   03/16/21 1517  (!) 113 25 (!) 183/102 95 %   03/16/21 1512  (!) 106 25 (!) 173/105 94 %   03/16/21 1507  (!) 112 22 (!) 164/101 96 %   03/16/21 1502  (!) 112 20 (!) 172/104 93 %   03/16/21 1457  (!) 114 20 (!) 164/103 96 %   03/16/21 1413    (!) 161/102 90 %     Oxygen Therapy  O2 Sat (%): (!) 88 % (03/17/21 1316)  Pulse via Oximetry: 110 beats per minute (03/17/21 1316)  O2 Device: Nasal cannula (03/17/21 1128)  O2 Flow Rate (L/min): 3 l/min (03/17/21 1128)  FIO2 (%): 32 % (03/17/21 1128)  ETCO2 (mmHg): 0 mmHg (03/16/21 1527)    Body mass index is 36.79 kg/m².     Physical Exam:   General:  No acute distress, speaking in full sentences, no use of accessory muscles   Lungs:  Clear to auscultation bilaterally   CV:  Regular rate and rhythm with normal S1 and S2   Abdomen:  Soft, nontender, nondistended, normoactive bowel sounds   Extremities:  Trace nonpitting edema to LLE   Neuro:   Nonfocal, A&O x3   Psych:  Normal affect     Data Review:  I have reviewed all labs, meds, and studies from the last 24 hours:    Labs:    Recent Results (from the past 24 hour(s))   CBC WITH AUTOMATED DIFF    Collection Time: 03/16/21  9:49 PM   Result Value Ref Range    WBC 10.2 4.3 - 11.1 K/uL    RBC 3.72 (L) 4.05 - 5.2 M/uL    HGB 9.5 (L) 11.7 - 15.4 g/dL    HCT 30.9 (L) 35.8 - 46.3 %    MCV 83.1 79.6 - 97.8 FL    MCH 25.5 (L) 26.1 - 32.9 PG    MCHC 30.7 (L) 31.4 - 35.0 g/dL    RDW 17.6 (H) 11.9 - 14.6 %    PLATELET 194 227 - 556 K/uL    MPV 9.5 9.4 - 12.3 FL    ABSOLUTE NRBC 0.00 0.0 - 0.2 K/uL    DF AUTOMATED      NEUTROPHILS 75 43 - 78 %    LYMPHOCYTES 15 13 - 44 %    MONOCYTES 9 4.0 - 12.0 %    EOSINOPHILS 1 0.5 - 7.8 %    BASOPHILS 1 0.0 - 2.0 %    IMMATURE GRANULOCYTES 1 0.0 - 5.0 %    ABS. NEUTROPHILS 7.6 1.7 - 8.2 K/UL    ABS. LYMPHOCYTES 1.5 0.5 - 4.6 K/UL    ABS. MONOCYTES 0.9 0.1 - 1.3 K/UL    ABS. EOSINOPHILS 0.1 0.0 - 0.8 K/UL    ABS. BASOPHILS 0.1 0.0 - 0.2 K/UL    ABS. IMM. GRANS. 0.1 0.0 - 0.5 K/UL   PTT    Collection Time: 03/16/21  9:49 PM   Result Value Ref Range    aPTT 35.2 (H) 24.1 - 35.1 SEC   FIBRINOGEN    Collection Time: 03/16/21  9:49 PM   Result Value Ref Range    Fibrinogen 666 (H) 190 - 501 mg/dL   CBC WITH AUTOMATED DIFF    Collection Time: 03/17/21  3:46 AM   Result Value Ref Range    WBC 9.6 4.3 - 11.1 K/uL    RBC 3.77 (L) 4.05 - 5.2 M/uL    HGB 9.7 (L) 11.7 - 15.4 g/dL    HCT 31.4 (L) 35.8 - 46.3 %    MCV 83.3 79.6 - 97.8 FL    MCH 25.7 (L) 26.1 - 32.9 PG    MCHC 30.9 (L) 31.4 - 35.0 g/dL    RDW 17.4 (H) 11.9 - 14.6 %    PLATELET 377 576 - 932 K/uL    MPV 9.3 (L) 9.4 - 12.3 FL    ABSOLUTE NRBC 0.00 0.0 - 0.2 K/uL    DF AUTOMATED      NEUTROPHILS 77 43 - 78 %    LYMPHOCYTES 12 (L) 13 - 44 %    MONOCYTES 10 4.0 - 12.0 %    EOSINOPHILS 1 0.5 - 7.8 %    BASOPHILS 1 0.0 - 2.0 %    IMMATURE GRANULOCYTES 0 0.0 - 5.0 %    ABS. NEUTROPHILS 7.4 1.7 - 8.2 K/UL    ABS.  LYMPHOCYTES 1.1 0.5 - 4.6 K/UL ABS. MONOCYTES 0.9 0.1 - 1.3 K/UL    ABS. EOSINOPHILS 0.1 0.0 - 0.8 K/UL    ABS. BASOPHILS 0.1 0.0 - 0.2 K/UL    ABS. IMM. GRANS. 0.0 0.0 - 0.5 K/UL   PTT    Collection Time: 03/17/21  3:46 AM   Result Value Ref Range    aPTT 35.9 (H) 24.1 - 35.1 SEC   PROTHROMBIN TIME + INR    Collection Time: 03/17/21  3:46 AM   Result Value Ref Range    Prothrombin time 14.8 (H) 12.5 - 14.7 sec    INR 1.1     FIBRINOGEN    Collection Time: 03/17/21  3:46 AM   Result Value Ref Range    Fibrinogen 653 (H) 190 - 736 mg/dL   METABOLIC PANEL, BASIC    Collection Time: 03/17/21  3:46 AM   Result Value Ref Range    Sodium 140 136 - 145 mmol/L    Potassium 3.9 3.5 - 5.1 mmol/L    Chloride 110 (H) 98 - 107 mmol/L    CO2 25 21 - 32 mmol/L    Anion gap 5 (L) 7 - 16 mmol/L    Glucose 87 65 - 100 mg/dL    BUN 8 6 - 23 MG/DL    Creatinine 0.61 0.6 - 1.0 MG/DL    GFR est AA >60 >60 ml/min/1.73m2    GFR est non-AA >60 >60 ml/min/1.73m2    Calcium 8.3 8.3 - 10.4 MG/DL   CBC WITH AUTOMATED DIFF    Collection Time: 03/17/21 10:21 AM   Result Value Ref Range    WBC 9.1 4.3 - 11.1 K/uL    RBC 3.70 (L) 4.05 - 5.2 M/uL    HGB 9.7 (L) 11.7 - 15.4 g/dL    HCT 30.9 (L) 35.8 - 46.3 %    MCV 83.5 79.6 - 97.8 FL    MCH 26.2 26.1 - 32.9 PG    MCHC 31.4 31.4 - 35.0 g/dL    RDW 17.3 (H) 11.9 - 14.6 %    PLATELET 918 033 - 287 K/uL    MPV 10.1 9.4 - 12.3 FL    ABSOLUTE NRBC 0.00 0.0 - 0.2 K/uL    DF AUTOMATED      NEUTROPHILS 74 43 - 78 %    LYMPHOCYTES 14 13 - 44 %    MONOCYTES 11 4.0 - 12.0 %    EOSINOPHILS 1 0.5 - 7.8 %    BASOPHILS 0 0.0 - 2.0 %    IMMATURE GRANULOCYTES 0 0.0 - 5.0 %    ABS. NEUTROPHILS 6.8 1.7 - 8.2 K/UL    ABS. LYMPHOCYTES 1.3 0.5 - 4.6 K/UL    ABS. MONOCYTES 1.0 0.1 - 1.3 K/UL    ABS. EOSINOPHILS 0.1 0.0 - 0.8 K/UL    ABS. BASOPHILS 0.0 0.0 - 0.2 K/UL    ABS. IMM.  GRANS. 0.0 0.0 - 0.5 K/UL   PTT    Collection Time: 03/17/21 10:21 AM   Result Value Ref Range    aPTT 36.3 (H) 24.1 - 35.1 SEC   FIBRINOGEN    Collection Time: 03/17/21 10:21 AM   Result Value Ref Range    Fibrinogen 653 (H) 190 - 501 mg/dL       All Micro Results     None          EKG Results     Procedure 720 Value Units Date/Time    EKG 12 LEAD INITIAL [[id number]] Collected: 03/15/21 1705    Order Status: Completed Updated: 03/16/21 0712     Ventricular Rate 115 BPM      Atrial Rate 115 BPM      P-R Interval 160 ms      QRS Duration 96 ms      Q-T Interval 328 ms      QTC Calculation (Bezet) 453 ms      Calculated P Axis 73 degrees      Calculated R Axis 14 degrees      Calculated T Axis 67 degrees      Diagnosis --     Sinus tachycardia  Possible Left atrial enlargement  Borderline ECG  When compared with ECG of 12-MAR-2009 19:19,  No significant change was found  Confirmed by Niels Dunlap [id number]) on 3/16/2021 7:12:37 AM            Other Studies:  Mri Brain Wo Cont    Result Date: 3/15/2021  MRI brain without contrast: History: Left-sided weakness, acute onset Imaging sequences: Sagittal short TR/short TE, axial short TR/short TE, long TR/long TE, FLAIR, gradient recall, diffusion weighted images and ADC mapping. Coronal FLAIR. Imaging was performed on a 1.5 Kelly magnet. Comparison: None. Correlation is made to the CT scan of the brain and CT perfusion study performed one day earlier. Findings: The ventricles and sulci are normal in size and configuration. There are no extra-axial fluid collections. Normal flow voids are present within all of the major intracranial vessels. There are acute infarctions within the right frontotemporal lobes. The largest component within the frontal temporal region involving the insula measures up to 2.5 cm in maximal dimension. There is also involvement of the basal ganglia. There are small components of petechial hemorrhage within the right basal ganglia. Curvilinear blooming artifact within the posterior sylvian region likely represents thrombus within a MCA branch.  Patchy and discrete foci of T2 prolongation are present within the supratentorial white matter. These are nonspecific findings but would be most compatible with mild chronic small vessel ischemic changes. The visualized mastoid air cells and paranasal sinuses are well pneumatized and aerated. 1. Acute infarctions within the right MCA territory with small components of petechial hemorrhage within the basal ganglia. 2. Mild chronic small vessel ischemic change. Ct Head Wo Cont    Result Date: 3/15/2021  EXAM: CT HEAD WITHOUT CONTRAST INDICATION: post tpa. COMPARISON: Brain MRI and CT from 3/14/2021 TECHNIQUE: Contiguous axial images were obtained from the skull base through the vertex without IV contrast. Radiation dose reduction techniques were used for this study. Our CT scanners use one or all of the following: Automated exposure control, adjustment of the mA and/or kV according to patient size, iterative reconstruction. FINDINGS: Right MCA territory infarct now evident. Global parenchymal volume loss and ex vacuo ventriculomegaly. Periventricular white matter hypoattenuation reflects sequelae of small vessel ischemic disease. No hydrocephalus or midline shift. No extra-axial mass or hemorrhage. The basal cisterns are patent. The visualized portions of the orbits are normal. The mastoid air cells and paranasal sinuses are patent. The visualized vascular structures have an unremarkable noncontrast appearance. The calvarium and soft tissues appear normal.     No acute intracranial abnormality evident by CT. No intracranial hemorrhage evident following TPA administration     Ct Perf W Cbf    Result Date: 3/14/2021  CT Perfusion Imaging INDICATION:  Left-sided weakness CT perfusion imaging of the brain was performed after the administration of intravenous contrast.  Perfusion maps and perfusion analysis output were generated using the [email or web address] perfusion processing software algorithm. Radiation dose reduction techniques were used for this study:   All CT scans performed at this facility use one or all of the following: Automated exposure control, adjustment of the mA and/or kVp according to patient's size, iterative reconstruction. COMPARISON: None. Correlation is made to the CT scan and CT brain performed on the same day. The study is now just been brought to my attention for review. [employer] Output Values: CBF < 30% volume:  2 ml   (core infarction volume greater than 50 cc associated with poor outcomes) Tmax > 6 seconds: 167 ml Tmax/CBF Mismatch Volume: 165 ml Tmax/CBF Mismatch Ratio: 83.5 Hypoperfusion Intensity Ratio (Tmax > 10 seconds/Tmax > 6 seconds): 0.0  (values greater than 0.5 associated with poor outcome) Tmax > 10 seconds Volume: 0 ml   (volume greater than 100 mL is associated with poor outcome)     Small core infarct within the right temporal lobe with large penumbra within the right MCA territory. Please note that the determination of patient treatment is not based solely upon imaging factors or calculation values. Management of ischemia is at the discretion of the primary physician and is based upon a combination of clinical and imaging data, along other factors. Results [facility] to [facility] from the neuro interventional service at approximately 5:35 PM 03/14/2021. She was already aware of the findings. Cta Code Neuro Head And Neck W Cont    Result Date: 3/14/2021  Title:  CT arteriogram of the neck and head. Indication: Code stroke. Acute neurological deficit. Large vessel arterial occlusion. Left-sided weakness. Metastatic melanoma. Technique: Axial images of the neck and head were obtained after the uneventful administration of intravenous iodinated contrast media. Contrast was used to best identify the arterial structures. Images were reviewed on a separate, free standing, three-dimensional workstation as per the referring physicians request.  Per [address] [facility] administration: This examination was analyzed by the [address] ai algorithm.  All stenosis percentages derived by comparing the narrowest segment with the distal Internal Carotid Artery luminal diameter, as described in the [place] Symptomatic Carotid Endarterectomy Trial (NASCET) criteria. All CT scans at this facility are performed using dose reduction/dose modulation techniques, as appropriate the performed exam, including the following: Automated Exposure Control; Adjustment of the mA and/or kV according to patient size (this includes techniques or standardized protocols for targeted exams where dose is matched to indication/reason for exam); and Use of Iterative Reconstruction Technique. Comparison: Head CT same date. Findings: Lungs:  Although incompletely visualized, probable small right pleural effusion. Bones:  No destructive lesion. Paranasal sinuses:  Mild mucosal thickening in the right maxillary sinus. Brain:  No midline shift. Soft tissues:  Surgical clips in the right neck and upper chest.  Small gas bubbles in the soft tissues in/around the subclavian and brachiocephalic veins. I suspect this represents gas inadvertently delivered during IV administration. Superior sagittal sinus:  Poorly enhanced, probably patent. Right transverse sinus:  Extremely poorly enhanced, presumably patent. Left transverse sinus:  Extremely poorly enhanced, presumably patent. Aortic arch:  Bovine arch great vessel orientation, mild atherosclerotic disease, patent. Right brachiocephalic artery:  Patent. Right subclavian artery:  Patent. Left subclavian artery:  Patent. Right common carotid artery: Tortuous, patent. Right external carotid artery:  Patent. Cervical Right internal carotid artery:  Mild atherosclerotic disease in the carotid bulb, tortuous, patent. Stenosis:  0 % Left common carotid artery: Patent. Left external carotid artery:  Patent. Cervical Left internal carotid artery:  Mild atherosclerotic disease in the carotid bulb, tortuous, patent.   Stenosis:  0 % Right vertebral artery: Tortuous, patent. Left vertebral artery: Tortuous, patent. Basilar artery:  Patent. Intracranial right internal carotid artery:  Ectatic, mild atherosclerotic disease, patent. Right middle cerebral artery:  Occlusion at the junction of M1 and M2 segments. Right anterior cerebral artery:  Patent. Anterior communicating artery: Not definitely visualized. Left anterior cerebral artery:  Patent. Left middle cerebral artery:  Patent. Intracranial left internal carotid artery:  Ectatic, mild atherosclerotic disease, patent. Right posterior communicating artery: Patent. Left posterior communicating artery:  Not visualized. Right posterior cerebral artery:  Patent. Left posterior cerebral artery:  Patent. Right middle cerebral artery occlusion at the junction of M1 and M2 portions. Ct Code Neuro Head Wo Contrast    Result Date: 3/14/2021  Head CT INDICATION: Left-sided weakness Multiple axial images obtained through the brain without intravenous contrast. Radiation dose reduction techniques were used for this study: All CT scans performed at this facility use one or all of the following: Automated exposure control, adjustment of the mA and/or kVp according to patient's size, iterative reconstruction. Compared with 09/24/2020. FINDINGS: No areas of abnormal attenuation are seen in the brain. There is no CT evidence of acute hemorrhage or infarction. The ventricles are normal in size. There are no extra-axial fluid collections. No masses are seen. The sinuses are clear. There are no bony lesions. No CT evidence of acute intracranial abnormality.           Current Meds:   Current Facility-Administered Medications   Medication Dose Route Frequency    nicotine (NICODERM CQ) 14 mg/24 hr patch 1 Patch  1 Patch TransDERmal Q24H    [START ON 3/18/2021] hydroCHLOROthiazide (HYDRODIURIL) tablet 25 mg  25 mg Oral DAILY    metoprolol (LOPRESSOR) injection 2.5 mg  2.5 mg IntraVENous Q6H PRN  levalbuterol (XOPENEX) nebulizer soln 0.63 mg/3 mL  0.63 mg Nebulization Q4H RT    alteplase (CATHFLO) 10 mg in 0.9% sodium chloride 500 mL infusion  1 mg/hr IntraCATHeter- VENous CONTINUOUS    acetaminophen (TYLENOL) tablet 650 mg  650 mg Oral Q4H PRN    oxyCODONE IR (ROXICODONE) tablet 5 mg  5 mg Oral Q4H PRN    HYDROmorphone (DILAUDID) injection 1 mg  1 mg IntraVENous Q1H PRN    promethazine (PHENERGAN) with saline injection 12.5 mg  12.5 mg IntraVENous Q4H PRN    LORazepam (ATIVAN) injection 1 mg  1 mg IntraVENous Q2H PRN    zolpidem (AMBIEN) tablet 5 mg  5 mg Oral QHS PRN    heparin 25,000 units in dextrose 500 mL infusion  300 Units/hr IntraVENous CONTINUOUS    0.9% sodium chloride infusion  35 mL/hr IntraCATHeter- VENous CONTINUOUS    0.9% sodium chloride infusion  125 mL/hr IntraCATHeter- VENous CONTINUOUS    labetaloL (NORMODYNE;TRANDATE) injection 20 mg  20 mg IntraVENous Q6H PRN    ALPRAZolam (XANAX) tablet 2 mg  2 mg Oral TID PRN    amitriptyline (ELAVIL) tablet 150 mg  150 mg Oral QHS    dilTIAZem ER (CARDIZEM CD) capsule 120 mg  120 mg Oral DAILY    pantoprazole (PROTONIX) tablet 40 mg  40 mg Oral ACB&D    zolpidem (AMBIEN) tablet 5 mg  5 mg Oral QHS    sodium chloride (NS) flush 5-40 mL  5-40 mL IntraVENous Q8H    sodium chloride (NS) flush 5-40 mL  5-40 mL IntraVENous PRN    HYDROcodone-acetaminophen (NORCO)  mg tablet 1 Tab  1 Tab Oral Q4H PRN       Problem List:  Hospital Problems as of 3/17/2021 Date Reviewed: 12/20/2018          Codes Class Noted - Resolved POA    * (Principal) Bilateral pulmonary embolism ([address] ICD-10-CM: I26.99  ICD-9-CM: 415.19  3/15/2021 - Present Yes        Leukocytosis ICD-10-CM: F82.320  ICD-9-CM: 288.60  3/15/2021 - Present Yes        Normocytic anemia ICD-10-CM: D64.9  ICD-9-CM: 285.9  3/15/2021 - Present Yes        Hyponatremia ICD-10-CM: E87.1  ICD-9-CM: 276.1  3/15/2021 - Present Yes        Hyperlipidemia (Chronic) ICD-10-CM: E78.5  ICD-9-CM: 272.4  3/15/2013 - Present Yes        IAN (obstructive sleep apnea) (Chronic) ICD-10-CM: G47.33  ICD-9-CM: 327.23  3/15/2013 - Present Yes        Asthma ICD-10-CM: J45.909  ICD-9-CM: 493.90  3/15/2013 - Present Yes        Schizophrenia ([facility] ICD-10-CM: F20.9  ICD-9-CM: 295.90  3/15/2013 - Present Yes        Morbid obesity ([facility] ICD-10-CM: E66.01  ICD-9-CM: 278.01  3/5/2013 - Present Yes        Bipolar 1 disorder ([facility] (Chronic) ICD-10-CM: F31.9  ICD-9-CM: 296.7  3/5/2013 - Present Yes        HTN (hypertension) (Chronic) ICD-10-CM: I10  ICD-9-CM: 401.9  3/5/2013 - Present Yes               Part of this note was written by using a voice dictation software and the note has been proof read but may still contain some grammatical/other typographical errors.     Signed By: Christian Donovan MD   [employer] [facility]    March 17, 2021  5:15 PM        38 minutes critical care time spent, high risk with EKOS

## 2021-03-17 NOTE — PROGRESS NOTES
TRANSFER - OUT REPORT:    Verbal report given to GINA Campbell on [employer]  being transferred to [facility] (Telemetry Observation) for routine progression of care       Report consisted of patients Situation, Background, Assessment and   Recommendations(SBAR). Information from the following report(s) SBAR, Kardex, ED Summary, Procedure Summary, Intake/Output, MAR, Recent Results, Cardiac Rhythm ST and Quality Measures was reviewed with the receiving nurse. Lines:   Peripheral IV 03/15/21 Right Hand (Active)   Site Assessment Clean, dry, & intact 03/17/21 0731   Phlebitis Assessment 0 03/17/21 0731   Infiltration Assessment 0 03/17/21 0731   Dressing Status Clean, dry, & intact 03/17/21 0731   Dressing Type Transparent;Tape 03/17/21 0731   Hub Color/Line Status Pink; Infusing;Patent 03/17/21 0731   Alcohol Cap Used No 03/17/21 0731       Peripheral IV 03/16/21 Right; Inner Forearm (Active)   Site Assessment Clean, dry, & intact 03/17/21 0731   Phlebitis Assessment 0 03/17/21 0731   Infiltration Assessment 0 03/17/21 0731   Dressing Status Clean, dry, & intact 03/17/21 0731   Dressing Type Transparent;Tape 03/17/21 0731   Hub Color/Line Status Pink; Infusing;Patent 03/17/21 0731   Alcohol Cap Used No 03/17/21 0731        Opportunity for questions and clarification was provided.       Patient transported with:   Monitor  O2 @ 4 liters  Registered Nurse  [facility]

## 2021-03-17 NOTE — PROGRESS NOTES
Chart reviewed s/p tx to CCU for SOB, small pulmonary infarct, L >> R PE. Chronic Bi L > R LE swelling--improved w overnight elevation. IVC Filter placed 2013. Patent IVC 2015 per MD notes and now post Pulmonary arteriogram w initiation of tPA thrombolysis by IR MD. Screen completed prior to tx.  CM will follow for any assist and d/c needs/POC per MD.

## 2021-03-17 NOTE — PROCEDURES
Department of Interventional Radiology  [phone]        Interventional Radiology Brief Procedure Note    Patient: Lawanda Siddiqui MRN: [id number]  SSN: [id number]    YOB: 1970  Age: 44 y.o. Sex: female      Date of Procedure: 3/17/2021    Pre-Procedure Diagnosis: PE s/p thrombolysis. Chronic IVC Filter. Acute and Chronic LLE DVT. Post-Procedure Diagnosis: SAME    Procedure(s):   PA infusion catheter removal.  Pelvic/Abdominal Venogram    Brief Description of Procedure: as Holli Galloway    Performed By: Joseph Page MD     Assistants: None    Anesthesia:Moderate Sedation    Estimated Blood Loss: Less than 10ml    Specimens:  None    Implants:  None    Findings: Non-occlusive thrombus in the apex of the IVC Filter. One broken IVC Filter strut. Patent Bi CFV, EIV, and CIV. Complications: None    Recommendations: 1 hour bedrest w HOB elevated. Begin Eliquis today. Hopefully, home tomorrow. Rx for compression hose given. Follow Up: Office in 2-3 months. Will discuss/plan IVC F retrieval at that time.       Signed By: Joseph Page MD     March 17, 2021

## 2021-03-17 NOTE — PROGRESS NOTES
TRANSFER - OUT REPORT:    Verbal report given to GINA Campbell on [employer]  being transferred to ICU for routine post - op       Report consisted of patients Situation, Background, Assessment and   Recommendations(SBAR). Information from the following report(s) SBAR, Procedure Summary and MAR was reviewed with the receiving nurse. Opportunity for questions and clarification was provided. Conscious Sedation:   75 Mcg of Fentanyl administered  1.5 Mg of Versed administered    Pt tolerated procedure well. Visit Vitals  BP (!) 165/90   Pulse 98   Temp 98.2 °F (36.8 °C)   Resp (P) 18   Ht 5' 9\" (1.753 m)   Wt 112.9 kg (249 lb)   SpO2 97%   BMI 36.77 kg/m²     Past Medical History:   Diagnosis Date    Anemia     no supplements     Anxiety     Managed with meds     Asthma     none since stopped smoking-- 6 months ago, Inhalers prn     Benzodiazepine (tranquilizer) overdose 20 yrs ago    BMI 36.0-36.9,adult     Chronic pain     back, knees    Depression     medication    GERD (gastroesophageal reflux disease)     controlled with medication    Hypertension     controlled with med    Obese     bmi =35    IAN (obstructive sleep apnea)     DOES NOT WEAR CPAP    Panic attacks     S/P gastric bypass 2013    wt loss 60lbs-- gained all back    Schizophrenia (HCC)     Seizures (HCC)     one seizure several yrs ago- undetermined cause     Suicide, attempted 20 YRS AGO    Thromboembolus ([facility] [address] 2010    left leg-- IVF PLACED    Thyroid disease     HYPOTHYROIDISM     Peripheral IV 03/15/21 Right Hand (Active)   Site Assessment Clean, dry, & intact 03/17/21 0731   Phlebitis Assessment 0 03/17/21 0731   Infiltration Assessment 0 03/17/21 0731   Dressing Status Clean, dry, & intact 03/17/21 0731   Dressing Type Transparent;Tape 03/17/21 0731   Hub Color/Line Status Pink; Infusing;Patent 03/17/21 0731   Alcohol Cap Used No 03/17/21 0731       Peripheral IV 03/16/21 Right; Inner Forearm (Active)   Site Assessment Clean, dry, & intact 03/17/21 0731   Phlebitis Assessment 0 03/17/21 0731   Infiltration Assessment 0 03/17/21 0731   Dressing Status Clean, dry, & intact 03/17/21 0731   Dressing Type Transparent;Tape 03/17/21 0731   Hub Color/Line Status Pink; Infusing;Patent 03/17/21 0731   Alcohol Cap Used No 03/17/21 0731           Sheath 03/16/21 (Active)   Site Assessment Clean, dry, & intact 03/17/21 0731   Dressing Status Clean, dry, & intact 03/17/21 0731   Dressing Type Gauze;Transparent 03/17/21 0731   Hub Color/Line Status Infusing;Patent 03/17/21 0731        External Female Catheter 03/17/21 (Active)   Site Assessment Clean, dry, & intact 03/17/21 0731   Repositioned Yes 03/17/21 0731   Perineal Care Yes 03/17/21 0731   Wick Changed Yes 03/17/21 0731   Suction Canister/Tubing Changed Yes 03/17/21 0731   Urine Output (mL) 600 ml 03/17/21 0731

## 2021-03-17 NOTE — PROGRESS NOTES
TRANSFER - IN REPORT:    Verbal report received from Bard Guerrero and Shawna Horta RN (name) on [facility]  being received from IR/CCU (unit) for routine progression of care      Report consisted of patients Situation, Background, Assessment and   Recommendations(SBAR). Information from the following report(s) SBAR, Kardex, ED Summary, Procedure Summary, Intake/Output, MAR, Recent Results and Cardiac Rhythm NSR/ST was reviewed with the receiving nurse. Opportunity for questions and clarification was provided. Assessment completed upon patients arrival to unit and care assumed.

## 2021-03-18 PROCEDURE — 74011250637 HC RX REV CODE- 250/637: Performed by: INTERNAL MEDICINE

## 2021-03-18 PROCEDURE — 74011250636 HC RX REV CODE- 250/636: Performed by: RADIOLOGY

## 2021-03-18 PROCEDURE — 77030012893

## 2021-03-18 PROCEDURE — 97530 THERAPEUTIC ACTIVITIES: CPT

## 2021-03-18 PROCEDURE — 77010033678 HC OXYGEN DAILY

## 2021-03-18 PROCEDURE — 77030040361 HC SLV COMPR DVT MDII -B

## 2021-03-18 PROCEDURE — 74011000250 HC RX REV CODE- 250: Performed by: INTERNAL MEDICINE

## 2021-03-18 PROCEDURE — 97161 PT EVAL LOW COMPLEX 20 MIN: CPT

## 2021-03-18 PROCEDURE — 94760 N-INVAS EAR/PLS OXIMETRY 1: CPT

## 2021-03-18 PROCEDURE — 2709999900 HC NON-CHARGEABLE SUPPLY

## 2021-03-18 PROCEDURE — 74011250637 HC RX REV CODE- 250/637: Performed by: RADIOLOGY

## 2021-03-18 PROCEDURE — 74011250637 HC RX REV CODE- 250/637: Performed by: FAMILY MEDICINE

## 2021-03-18 PROCEDURE — 74011000250 HC RX REV CODE- 250: Performed by: HOSPITALIST

## 2021-03-18 PROCEDURE — 65660000000 HC RM CCU STEPDOWN

## 2021-03-18 PROCEDURE — 77030038269 HC DRN EXT URIN PURWCK BARD -A

## 2021-03-18 PROCEDURE — 94640 AIRWAY INHALATION TREATMENT: CPT

## 2021-03-18 RX ADMIN — LEVALBUTEROL HYDROCHLORIDE 0.63 MG: 0.63 SOLUTION RESPIRATORY (INHALATION) at 08:18

## 2021-03-18 RX ADMIN — DILTIAZEM HYDROCHLORIDE 120 MG: 120 CAPSULE, COATED, EXTENDED RELEASE ORAL at 08:43

## 2021-03-18 RX ADMIN — LEVALBUTEROL HYDROCHLORIDE 0.63 MG: 0.63 SOLUTION RESPIRATORY (INHALATION) at 16:02

## 2021-03-18 RX ADMIN — Medication 10 ML: at 22:00

## 2021-03-18 RX ADMIN — HYDROCHLOROTHIAZIDE 25 MG: 25 TABLET ORAL at 08:43

## 2021-03-18 RX ADMIN — LEVALBUTEROL HYDROCHLORIDE 0.63 MG: 0.63 SOLUTION RESPIRATORY (INHALATION) at 20:41

## 2021-03-18 RX ADMIN — APIXABAN 10 MG: 5 TABLET, FILM COATED ORAL at 08:43

## 2021-03-18 RX ADMIN — ZOLPIDEM TARTRATE 5 MG: 5 TABLET, COATED ORAL at 21:35

## 2021-03-18 RX ADMIN — LEVALBUTEROL HYDROCHLORIDE 0.63 MG: 0.63 SOLUTION RESPIRATORY (INHALATION) at 11:17

## 2021-03-18 RX ADMIN — HYDROCODONE BITARTRATE AND ACETAMINOPHEN 1 TABLET: 10; 325 TABLET ORAL at 21:35

## 2021-03-18 RX ADMIN — PANTOPRAZOLE SODIUM 40 MG: 40 TABLET, DELAYED RELEASE ORAL at 16:18

## 2021-03-18 RX ADMIN — SODIUM CHLORIDE 125 ML/HR: 900 INJECTION, SOLUTION INTRAVENOUS at 03:00

## 2021-03-18 RX ADMIN — LEVALBUTEROL HYDROCHLORIDE 0.63 MG: 0.63 SOLUTION RESPIRATORY (INHALATION) at 03:00

## 2021-03-18 RX ADMIN — PANTOPRAZOLE SODIUM 40 MG: 40 TABLET, DELAYED RELEASE ORAL at 05:09

## 2021-03-18 RX ADMIN — Medication 10 ML: at 05:07

## 2021-03-18 RX ADMIN — APIXABAN 10 MG: 5 TABLET, FILM COATED ORAL at 21:35

## 2021-03-18 RX ADMIN — AMITRIPTYLINE HYDROCHLORIDE 150 MG: 50 TABLET, FILM COATED ORAL at 22:10

## 2021-03-18 RX ADMIN — HYDROCODONE BITARTRATE AND ACETAMINOPHEN 1 TABLET: 10; 325 TABLET ORAL at 13:27

## 2021-03-18 RX ADMIN — OXYCODONE 5 MG: 5 TABLET ORAL at 09:58

## 2021-03-18 RX ADMIN — LABETALOL HYDROCHLORIDE 20 MG: 5 INJECTION, SOLUTION INTRAVENOUS at 20:18

## 2021-03-18 RX ADMIN — Medication 10 ML: at 14:00

## 2021-03-18 RX ADMIN — ALPRAZOLAM 2 MG: 0.5 TABLET ORAL at 16:21

## 2021-03-18 NOTE — PROGRESS NOTES
Problem: Falls - Risk of  Goal: *Absence of Falls  Description: Document Cristal Marking Fall Risk and appropriate interventions in the flowsheet. 3/18/2021 1040 by Marylou Donahue RN  Outcome: Progressing Towards Goal  Note: Fall Risk Interventions:  Mobility Interventions: Assess mobility with egress test, Bed/chair exit alarm, Communicate number of staff needed for ambulation/transfer, Patient to call before getting OOB         Medication Interventions: Evaluate medications/consider consulting pharmacy, Patient to call before getting OOB, Bed/chair exit alarm    Elimination Interventions: Bed/chair exit alarm, Call light in reach, Patient to call for help with toileting needs           3/18/2021 1039 by Marylou Donahue RN  Note: Fall Risk Interventions:  Mobility Interventions: Assess mobility with egress test, Bed/chair exit alarm, Communicate number of staff needed for ambulation/transfer, Patient to call before getting OOB         Medication Interventions: Evaluate medications/consider consulting pharmacy, Patient to call before getting OOB, Bed/chair exit alarm    Elimination Interventions: Bed/chair exit alarm, Call light in reach, Patient to call for help with toileting needs              Problem: Patient Education: Go to Patient Education Activity  Goal: Patient/Family Education  Outcome: Progressing Towards Goal     Problem: Pressure Injury - Risk of  Goal: *Prevention of pressure injury  Description: Document Augustine Scale and appropriate interventions in the flowsheet. Outcome: Progressing Towards Goal  Note: Pressure Injury Interventions:       Moisture Interventions: Absorbent underpads, Check for incontinence Q2 hours and as needed    Activity Interventions: Pressure redistribution bed/mattress(bed type), Increase time out of bed    Mobility Interventions: Pressure redistribution bed/mattress (bed type), Float heels, Turn and reposition approx.  every two hours(pillow and wedges)    Nutrition Interventions: Document food/fluid/supplement intake, Discuss nutritional consult with provider    Friction and Shear Interventions: Apply protective barrier, creams and emollients, HOB 30 degrees or less, Lift sheet, Minimize layers                Problem: Patient Education: Go to Patient Education Activity  Goal: Patient/Family Education  Outcome: Progressing Towards Goal     Problem: Patient Education: Go to Patient Education Activity  Goal: Patient/Family Education  Outcome: Progressing Towards Goal

## 2021-03-18 NOTE — PROGRESS NOTES
Date of Outreach Update:  [employer] was seen and assessed. Previous Outreach assessment has been reviewed. There have been no significant clinical changes since the completion of the last dated Outreach assessment. Pt sitting up in recliner, visiting with family. On 3L NC. No needs voiced. Will continue to follow up per outreach protocol.     Signed By:   Ivelisse Jessica RN    March 18, 2021 7:15 PM

## 2021-03-18 NOTE — PROGRESS NOTES
[facility]ist Progress Note     Name:  Charo Avila  Age:50 y.o. Sex:female   :  1970    MRN:  [id number]     Admit Date:  3/15/2021    Reason for Admission:  Bilateral pulmonary embolism ([address] [I26.99]    Assessment & Plan     BL PE  LLE DVT  3/17  - EKOS per IR  tolerating anticoagulation. Weaning oxygen     Acute hypoxic resp failure  Secondary to above. - wean O2 as tolerated  till 3 L nasal cannulawean oxygen     HTN  - cont cardizem  3/17  - add HCTZ  monitor electrolytesBP better with HCTZ.     Bipolar/depression  - cont home meds  unchanged.     Diet:  DIET NPO  DVT PPx: heparin  Code: Full Code  Dispo/Discharge Planning: home in a 448 when cleared by IR and off suppl O2        Diet:  DIET REGULAR  DVT PPx: Eliquis    Code: Full Code    Dispo/Discharge Planning: Home with family    Hospital Course/Subjective:     61-year-old -American female with bipolar disorder, obesity, dyslipidemia, obstructive sleep apnea prior DVT status post IVC filter admitted with acute hypoxemic respiratory failure March 15 and found to have significant clot burden with bilateral pulmonary thromboembolus. Evaluated  for EKOS procedure and performed. Interval updateMarch 18: Tolerating Eliquisclinically stablestill requiring 3 L nasal cannula oxygen. Discussed increase activity, wean oxygen, hopefully home next 24 to 48 hours with or without home oxygen therapy. Review of Systems: 14 point review of systems is otherwise negative with the exception of the elements mentioned above.     Objective:     Patient Vitals for the past 24 hrs:   Temp Pulse Resp BP SpO2   21 1602     94 %   21 1559  (!) 109      21 1504 98 °F (36.7 °C) (!) 104 20 (!) 141/95 90 %   21 1200  (!) 109      21 1153 98 °F (36.7 °C) (!) 109 20 (!) 152/99 94 %   21 1118     93 %   21 0818     91 %   21 0802 98.1 °F (36.7 °C) (!) 110 20 (!) 164/98 91 %   03/18/21 0800  (!) 110      03/18/21 0412 98 °F (36.7 °C) (!) 105 20 (!) 166/99 95 %   03/18/21 0400  (!) 109      03/18/21 0300     95 %   03/18/21 0016    (!) 144/91    03/17/21 2349     94 %   03/17/21 2343 97.7 °F (36.5 °C) (!) 113 20 (!) 178/120 94 %   03/17/21 2112 98 °F (36.7 °C) (!) 111 20 (!) 165/101 92 %   03/17/21 1954     95 %   03/17/21 1939 98.4 °F (36.9 °C) (!) 109  (!) 151/85 94 %   03/17/21 1924  (!) 106   95 %   03/17/21 1909  (!) 106  (!) 153/85 93 %   03/17/21 1854  (!) 107  (!) 152/90 92 %   03/17/21 1839  (!) 105  131/82 90 %   03/17/21 1824  (!) 106   (!) 89 %     Oxygen Therapy  O2 Sat (%): 94 % (03/18/21 1602)  Pulse via Oximetry: 102 beats per minute (03/18/21 1602)  O2 Device: Nasal cannula (03/18/21 1602)  Skin Assessment: Clean, dry, & intact (03/18/21 0800)  Skin Protection for O2 Device: No (03/18/21 0800)  O2 Flow Rate (L/min): 3 l/min (03/18/21 1602)  FIO2 (%): 32 % (03/17/21 1128)  ETCO2 (mmHg): 14 mmHg (03/17/21 1549)    Body mass index is 36.77 kg/m². Physical Exam:   General: No acute distress, speaking in full sentences, no use of accessory muscles   HEENT: Pupils equal and reactive to light and accommodation, oropharynx is clear   Neck: Supple, no lymphadenopathy, no JVD   Lungs: Clear to auscultation bilaterally   Cardiovascular: Regular rate and rhythm with normal S1 and S2   Abdomen: Soft, nontender, nondistended, normoactive bowel sounds   Extremities: No cyanosis clubbing or edema   Neuro: Nonfocal, A&O x3   Psych: Normal affect     Data Review:  I have reviewed all labs, meds, and studies from the last 24 hours:    Labs:    No results found for this or any previous visit (from the past 24 hour(s)).     All Micro Results     None          EKG Results     Procedure 720 Value Units Date/Time    EKG 12 LEAD INITIAL [[id number]] Collected: 03/15/21 1705    Order Status: Completed Updated: 03/16/21 5761     Ventricular Rate 115 BPM      Atrial Rate 115 BPM      P-R Interval 160 ms      QRS Duration 96 ms      Q-T Interval 328 ms      QTC Calculation (Bezet) 453 ms      Calculated P Axis 73 degrees      Calculated R Axis 14 degrees      Calculated T Axis 67 degrees      Diagnosis --     Sinus tachycardia  Possible Left atrial enlargement  Borderline ECG  When compared with ECG of 12-MAR-2009 19:19,  No significant change was found  Confirmed by SERJIO HALL ([id number]) on 3/16/2021 7:12:37 AM            Other Studies:  Ir Thrombolysis Transcath Non Coronary Or Intracranial    Result Date: 3/16/2021  PROCEDURE: Pulmonary angiography and placement of thrombolysis infusion catheter with internal ultrasound wire Procedural Personnel Attending physician(s): Louann Smith M.D. Pre-procedure diagnosis:  Very pleasant 57-year-old woman with 3 day history of worsening shortness of breath and right chest pain. CT scan last night confirms bilateral pulmonary emboli, left much worse than right. History is significant for left lower extremity DVT in 2010 treated with oral anticoagulation. [facility] IVC filter placed prophylactically 3/1/2013 prior to bariatric surgery. The original plan: 4 lower extremity venous ultrasound examination and, if negative, IVC filter retrieval approximately 3 months later. Unfortunately, the patient was lost to follow-up and was under the assumption that the filter should stay in permanently. The filter was present on a CT scan 8/16/2015 obtained at [facility]--no IVC or iliac vein thrombus appreciated on that CT scan. Post-procedure diagnosis:  Same Indication:  Pulmonary embolism Additional clinical history:  Mechanism of PE in light of an intact IVC filter is unknown. Complications:  No immediate complications. Angiography of the suprarenal inferior vena cava demonstrates no thrombus above the IVC filter. The infrarenal IVC was not evaluated today. Angiography of the left pulmonary artery confirms the presence of thrombus extending into the lower lobe pulmonary artery branches. Plan:  Transfer to intensive care unit. Heparin 300 units peripheral IV. TPA 1 mg per hour through EKOS catheter in the left lower lobe pulmonary artery system. Anticipate infusion catheter removal in 24 hours. Bilateral lower extremity venous ultrasound to be obtained tonight or tomorrow. This will determine the need for lower extremity venous intervention. The patient will need to follow up in the office in 1-3 months to discuss IVC filter retrieval.  Recommend lifelong anticoagulation as this is at least the 2nd venous thromboembolism present. _______________________________________________________________ PROCEDURE SUMMARY: - Venous access with ultrasound guidance - Left main pulmonary angiography - Superselective pulmonary angiography: Performed as described below - Pulmonary arterial interventions as described below - Additional procedure(s): Suprarenal IVC venogram PROCEDURE DETAILS: Pre-procedure Consent:  Informed written and oral consent for the procedure was obtained after explanation of risks (including, but not limitted to:  hemorrhage, vascular injury, infection) benefits, and alternatives. The patient's questions were answered to his/her satisfaction. He/She stated understanding and requested that we proceed. Final verification:  A time-out identifying the patient and planned procedure was performed prior to this procedure. Preparation (MIPS):  Maximal sterile barrier technique (including:  Sterile Ultrasound probe cover, Sterile Ultrasound gel, cap, mask, Sterile Gown, Sterile Gloves, Sterile Drape, hand hygiene, and 2% chlorhexidine cutaneous antisepsis) was used. Anesthesia/sedation Level of anesthesia/sedation:  Moderate sedation (conscious sedation) Anesthesia/sedation administered by:   Independent trained observer under attending supervision with continuous monitoring of the patient?s level of consciousness and physiologic status Total intra-service sedation time (minutes):  27 Access Local anesthesia was administered. The vessel was sonographically evaluated and determined to be patent. Real time ultrasound was used to visualize needle entry into the vessel and a permanent image was stored. A 8-Norwegian sheath was placed. Vein accessed:  Right internal jugular vein Access technique:  Micropuncture set with 21 gauge needle Inferior venacavogram The vena cava was catheterized using the following catheters:  6-Norwegian Super Torque Pigtail Catheter and wires:  Bentson wire. Indication for angiography:  Diagnostic angiography - There was no prior catheter-based angiographic study available and a full diagnostic study was performed. The decision to intervene was based on the diagnostic study. Vessel catheterized:  Suprarenal inferior vena cava Findings:  Patent suprarenal inferior vena cava with typical unopacified renal blood inflow. Pulmonary angiography and interventions The pulmonary arterial system was catheterized using the following catheters: 6-Norwegian Super Torque Pigtail Catheter, 6-Norwegian  catheter and wires: Bentson wire, and Amplatz wire. It was impossible to intubate the right atrium using the 6-Norwegian super torque catheter. Therefore, the 6-Norwegian  catheter was used to direct the Bentson wire through the right atrium, right ventricle, and into the left pulmonary artery. Indication for angiography:  Non-diagnostic angiography - Angiography was performed for roadmapping, fluoroscopic guidance, or vessel measurement. Vessel catheterized:  Left main pulmonary artery Findings:   The left main pulmonary artery is patent Vessel catheterized:  Left lower lobe pulmonary artery Findings:  Nonocclusive thrombus in the visualized left lower lobe pulmonary artery branches Pharmacologic thrombolysis Thrombolysis catheter location:  Left lower lobe pulmonary artery Thrombolysis catheter:  12 cm infusion length EKOS catheter Thrombolytic agent:  tPA Thrombolytic agent bolus dose (mg):  0 Thrombolytic agent continuous dose (mg/hr):  1 Closure The sheath was left in place. A sterile dressing was applied. Contrast Contrast agent:  Isovue-300 Contrast volume (mL):  13 Radiation Dose Reference Air Kerma ([place]):  373 mGy Dose Area Product/Kerma Area Product (DAP/TURNER/PKA): 8586.29cGy-cm2 Fluoroscopy Exposure Time:  5 minutes 30 seconds     Fluorographic Images:  1 inferior venacavogram; 2 pulmonary arteriograms Additional Details Additional description of procedure:  None Equipment details:  None Specimens removed:  None Estimated blood loss (mL):  Less than 10 Standardized report: [email or web address] Attestation Signer name: Zion Reyes M.D. I attest that I performed the entire procedure. I reviewed the stored images and agree with the report as written. Ct Chest W Cont    Result Date: 3/15/2021  CT OF THE CHEST WITH INTRAVENOUS CONTRAST, 3/15/2021 Indication: Cough and shortness of breath for 3 days. Chest tightness last night. Symptoms worse today with exertion. . Comparison: None Technique:   2.5 mm axial scans from above the aortic arch to the lung bases following the uneventful administration of 70 mL of Isovue-370. Intravenous contrast was given to evaluate for pulmonary embolism. All CT scans performed at this facility use one or all of the following: Automated exposure control, adjustment of the mA and/or kVp according to patient's size, iterative reconstruction. Findings: The base of the neck is unremarkable in appearance. No clear adenopathy is seen although there are asymmetric with prominent left axillary lymph nodes without clear dysmorphism. These could be reactive. The thoracic aorta is normal in caliber. Opacification of the pulmonary arteries is adequate. Extensive bilateral pulmonary emboli are seen.  These are seen extending from the distal left main pulmonary artery into the left lower lobe and left upper lobe. In addition, this is seen within right upper and lower lobe pulmonary arteries. Evaluation with lung windows demonstrates left upper lobe infiltrate best appreciated on axial image 33. Given the patient's known pulmonary emboli, this is suspicious for a pulmonary infarction. Otherwise, mild dependent atelectatic appearing changes are seen. A small dependent left pleural effusion is seen. Lungs are expanded without evidence for pneumothorax. No acute osseous abnormality is seen. Limited evaluation of the upper abdomen demonstrates no acute abnormality. The distal tip of what appears to be an IVC filter is seen. The patient appears to be status post gastric bypass surgery. 1.  Bilateral pulmonary emboli with emboli extending from the distal left main pulmonary artery into arteries of the left upper and lower lobes and additional more distal emboli in the right upper and lower lobes. 2. Left upper lobe airspace changes and small dependent left pleural effusion likely representing reactive changes from pulmonary emboli. Specifically, airspace changes in the left upper lobe could represent a developing infarction. 3. Prominent left axillary lymph nodes. These are not clearly dysmorphic and favored to be reactive. Specifically, this can be seen in the setting of a recent vaccine. This report was made using voice transcription. Despite my best efforts to avoid any, transcription errors may persist. If there is any question about the accuracy of the report or need for clarification, then please call [phone], or text me through [email or web address] for clarification or correction. The critical finding of pulmonary emboli was discussed with Dr. Myah Durant by myself personally at 9:00 PM on 3/15/2021.     Xr Chest Port    Result Date: 3/15/2021  CHEST X-RAY, single portable view  3/15/2021 History: Shortness of breath with exertion. Getting worse. Chest discomfort. Technique: Single frontal view of the chest. Comparison: Chest x-ray 5/26/2009. Findings: The cardiac silhouette is mild to moderately enlarged. This is a new finding from the prior remote comparison study. The lungs are expanded without evidence for pneumothorax. No evolving consolidative airspace process or pleural effusion is seen. 1.  Mild to moderate cardiomegaly which is new when compared to the prior comparison study from 2009. This can be an early indicator for heart failure in the correct clinical setting. This report was made using voice transcription. Despite my best efforts to avoid any, transcription errors may persist. If there is any question about the accuracy of the report or need for clarification, then please call [phone], or text me through [email or web address] for clarification or correction. Duplex Lower Ext Venous Bilat    Result Date: 3/16/2021  BILATERAL LOWER EXTREMITY DOPPLER ULTRASOUND  3/16/2021 HISTORY:  Bilateral leg swelling. History of LLE DVT in 2011.; Current pulmonary embolism. Technique: Grayscale, color Doppler and pulse wave Doppler images of the deep veins of lower extremities were obtained. FINDINGS: The deep veins of the right leg are patent, demonstrating normal intraluminal Doppler flow and venous compression. In the left leg, there is nonocclusive deep venous thrombus in the popliteal vein. Occlusive thrombus is present in both peroneal veins. DVT in the left leg present as described. The sonographer gave results to the patient's nurse.          Current Meds:   Current Facility-Administered Medications   Medication Dose Route Frequency    influenza vaccine 2020-21 (6 mos+)(PF) (FLUARIX/FLULAVAL/FLUZONE QUAD) injection 0.5 mL  0.5 mL IntraMUSCular PRIOR TO DISCHARGE    nicotine (NICODERM CQ) 14 mg/24 hr patch 1 Patch  1 Patch TransDERmal Q24H    hydroCHLOROthiazide (HYDRODIURIL) tablet 25 mg  25 mg Oral DAILY    apixaban (ELIQUIS) tablet 10 mg  10 mg Oral Q12H    labetaloL (NORMODYNE;TRANDATE) injection 20 mg  20 mg IntraVENous Q6H PRN    metoprolol (LOPRESSOR) injection 2.5 mg  2.5 mg IntraVENous Q6H PRN    levalbuterol (XOPENEX) nebulizer soln 0.63 mg/3 mL  0.63 mg Nebulization Q4H RT    acetaminophen (TYLENOL) tablet 650 mg  650 mg Oral Q4H PRN    oxyCODONE IR (ROXICODONE) tablet 5 mg  5 mg Oral Q4H PRN    HYDROmorphone (DILAUDID) injection 1 mg  1 mg IntraVENous Q1H PRN    promethazine (PHENERGAN) with saline injection 12.5 mg  12.5 mg IntraVENous Q4H PRN    LORazepam (ATIVAN) injection 1 mg  1 mg IntraVENous Q2H PRN    zolpidem (AMBIEN) tablet 5 mg  5 mg Oral QHS PRN    0.9% sodium chloride infusion  125 mL/hr IntraCATHeter- VENous CONTINUOUS    ALPRAZolam (XANAX) tablet 2 mg  2 mg Oral TID PRN    amitriptyline (ELAVIL) tablet 150 mg  150 mg Oral QHS    dilTIAZem ER (CARDIZEM CD) capsule 120 mg  120 mg Oral DAILY    pantoprazole (PROTONIX) tablet 40 mg  40 mg Oral ACB&D    zolpidem (AMBIEN) tablet 5 mg  5 mg Oral QHS    sodium chloride (NS) flush 5-40 mL  5-40 mL IntraVENous Q8H    sodium chloride (NS) flush 5-40 mL  5-40 mL IntraVENous PRN    HYDROcodone-acetaminophen (NORCO)  mg tablet 1 Tab  1 Tab Oral Q4H PRN       Problem List:  Hospital Problems as of 3/18/2021 Date Reviewed: 12/20/2018          Codes Class Noted - Resolved POA    DVT (deep venous thrombosis) (HCC) ICD-10-CM: I82.409  ICD-9-CM: 453.40  3/17/2021 - Present Unknown        * (Principal) Bilateral pulmonary embolism ([facility] [address] ICD-10-CM: I26.99  ICD-9-CM: 415.19  3/15/2021 - Present Yes        Leukocytosis ICD-10-CM: L26.685  ICD-9-CM: 288.60  3/15/2021 - Present Yes        Normocytic anemia ICD-10-CM: D64.9  ICD-9-CM: 285.9  3/15/2021 - Present Yes        Hyponatremia ICD-10-CM: E87.1  ICD-9-CM: 276.1  3/15/2021 - Present Yes        Hyperlipidemia (Chronic) ICD-10-CM: E78.5  ICD-9-CM: 272.4  3/15/2013 - Present Yes        IAN (obstructive sleep apnea) (Chronic) ICD-10-CM: G47.33  ICD-9-CM: 327.23  3/15/2013 - Present Yes        Asthma ICD-10-CM: J45.909  ICD-9-CM: 493.90  3/15/2013 - Present Yes        Schizophrenia ([facility] ICD-10-CM: F20.9  ICD-9-CM: 295.90  3/15/2013 - Present Yes        Morbid obesity ([facility] ICD-10-CM: E66.01  ICD-9-CM: 278.01  3/5/2013 - Present Yes        Bipolar 1 disorder ([facility] (Chronic) ICD-10-CM: F31.9  ICD-9-CM: 296.7  3/5/2013 - Present Yes        HTN (hypertension) (Chronic) ICD-10-CM: I10  ICD-9-CM: 401.9  3/5/2013 - Present Yes               Part of this note was written by using a voice dictation software and the note has been proof read but may still contain some grammatical/other typographical errors.     Signed By: DO Adrienne [facility]    March 18, 2021  6:05 PM

## 2021-03-18 NOTE — PROGRESS NOTES
Critical Care Outreach Nurse Progress Report:    Subjective: In to assess pt secondary to tx from [facility]  MEWS Score: 2 (03/18/21 0802)    Vitals:    03/18/21 1200 03/18/21 1504 03/18/21 1559 03/18/21 1602   BP:  (!) 141/95     Pulse: (!) 109 (!) 104 (!) 109    Resp:  20     Temp:  98 °F (36.7 °C)     SpO2:  90%  94%   Weight:       Height:            Objective: Pt resting quietly in bed, in NAD. Pain Intensity 1: 0 (03/18/21 0906)  Pain Location 1: Back  Pain Intervention(s) 1: Declines  Patient Stated Pain Goal: 0    Assessment: A&Ox4. On 3L NC, , Bp stable. No complaints. Plan: Will follow per outreach protocol.

## 2021-03-18 NOTE — PROGRESS NOTES
ACUTE PHYSICAL THERAPY GOALS:  (Developed with and agreed upon by patient and/or caregiver.)  1. Patient will perform bed mobility with INDEPENDENCE within 7 days. MET 3/18/2021  2. Patient will transfer bed to chair with INDEPENDENCE within 7 days. 3. Patient will demonstrate GOOD DYNAMIC STANDING balance within 7 day(s). 4. Patient will ambulate 300ft+ with independence within 7 days. 5. Patient will tolerate 25+ minutes of therapeutic activity/exercise and/or neuromuscular re-education while maintaining stable vitals to improve functional strength and activity tolerance within 7 days. 6. Patient will verbalize 3 energy conservation techniques within 7 treatment days with 0 verbal cues. PHYSICAL THERAPY ASSESSMENT: Initial Assessment, Daily Note and AM PT Treatment Day # 1      Divya Jackson is a 44 y.o. female   PRIMARY DIAGNOSIS: Bilateral pulmonary embolism (HCC)  Bilateral pulmonary embolism (HCC) [I26.99]       Reason for Referral:    ICD-10: Treatment Diagnosis: Difficulty in walking, Not elsewhere classified (R26.2)  INPATIENT: Payor: [employer] MEDICARE / Plan: [address] [place] [address] HMO / Product Type: Managed Care Medicare /     ASSESSMENT:     REHAB RECOMMENDATIONS:   Recommendation to date pending progress:  Setting:   No further skilled therapy   Equipment:    None     PRIOR LEVEL OF FUNCTION:  (Prior to Hospitalization) INITIAL/CURRENT LEVEL OF FUNCTION:  (Most Recently Demonstrated)   Bed Mobility:   Independent  Sit to Stand:   Independent  Transfers:   Independent  Gait/Mobility:   Independent Bed Mobility:   Independent  Sit to Stand:   Supervision  Transfers:   Supervision  Gait/Mobility:   Supervision     ASSESSMENT:  Ms. Laila Rubio is a 44year old female admitted with acute bilateral pulmonary embolisms. Patient seen this AM for initial PT evaluation.  At baseline, patient is an independent, household level ambulator and endorse 1 recent fall with left wrist injury (in splint). Today, presents with intact strength, AROM, balance, and ambulation. Does demonstrate decreased functional activity tolerance with increased work of breathing with bed mobility, transfers, and short distance ambulation on supplemental 02. Educated on energy conservation techniques with teach back and hand out. Reviewed upright positioning, ankle pumps, and glute squeezes to improve circulation secondary to self reported sedentary life style. Will follow during acute stay to progress toward stated goals. Anticipate no continued skilled PT needs at discharge. SUBJECTIVE:   Ms. Adriana Deng states, \"I feel a little better. \"    SOCIAL HISTORY/LIVING ENVIRONMENT: Lives independently, 1 level home with 4 steps to enter. At baseline, patient ambulates without utilizing an assistive device. Cares for 9year old grandson. Endorses sedentary lifestyle.       OBJECTIVE:     PAIN: VITAL SIGNS: LINES/DRAINS:   Pre Treatment: Pain Screen  Pain Scale 1: Numeric (0 - 10)  Pain Intensity 1: 0  Post Treatment: 0/10   IV  O2 Device: Nasal cannula     GROSS EVALUATION:   Within Functional Limits Abnormal/ Functional Abnormal/ Non-Functional (see comments) Not Tested Comments:   AROM [x] [] [] [] B UE/LE   PROM [] [] [] [x]    Strength [x] [] [] [] 4+/5 B UE/LE   Balance [x] [] [] [] Good dynamically sitting and standing   Posture [x] [] [] []    Sensation [] [] [] [x]    Coordination [] [] [] [x]    Tone [] [] [] [x]    Edema [] [] [] [x]    Activity Tolerance [] [x] [] [] Decreased; dyspnea on exertion     [] [] [] []      COGNITION/  PERCEPTION: Intact Impaired   (see comments) Comments:   Orientation [x] []    Vision [] [] Not tested   Hearing [] [] Not tested   Command Following [x] []    Safety Awareness [x] [] With activity pacing cues    [] []      MOBILITY: I Mod I S SBA CGA Min Mod Max Total  NT x2 Comments:   Bed Mobility    Rolling [x] [] [] [] [] [] [] [] [] [] []    Supine to Sit [x] [] [] [] [] [] [] [] [] [] []    Scooting [x] [] [] [] [] [] [] [] [] [] []    Sit to Supine [] [] [] [] [] [] [] [] [] [x] []    Transfers    Sit to Stand [] [] [x] [] [] [] [] [] [] [] []    Bed to Chair [] [] [] [x] [] [] [] [] [] [] []    Stand to Sit [] [] [x] [] [] [] [] [] [] [] []    I=Independent, Mod I=Modified Independent, S=Supervision, SBA=Standby Assistance, CGA=Contact Guard Assistance,   Min=Minimal Assistance, Mod=Moderate Assistance, Max=Maximal Assistance, Total=Total Assistance, NT=Not Tested  GAIT: I Mod I S SBA CGA Min Mod Max Total  NT x2 Comments:   Level of Assistance [] [] [] [x] [] [] [] [] [] [] [] Dyspnea on exertion 10ft within room. Distance 10ft    DME None    Gait Quality Normalized gait mechanics and good dynamic balance. Weightbearing Status N/A     I=Independent, Mod I=Modified Independent, S=Supervision, SBA=Standby Assistance, CGA=Contact Guard Assistance,   Min=Minimal Assistance, Mod=Moderate Assistance, Max=Maximal Assistance, Total=Total Assistance, NT=Not [facility]       How much difficulty does the patient currently have. .. Unable A Lot A Little None   1. Turning over in bed (including adjusting bedclothes, sheets and blankets)? [] 1   [] 2   [] 3   [x] 4   2. Sitting down on and standing up from a chair with arms ( e.g., wheelchair, bedside commode, etc.)   [] 1   [] 2   [] 3   [x] 4   3. Moving from lying on back to sitting on the side of the bed? [] 1   [] 2   [] 3   [x] 4   How much help from another person does the patient currently need. .. Total A Lot A Little None   4. Moving to and from a bed to a chair (including a wheelchair)? [] 1   [] 2   [] 3   [x] 4   5. Need to walk in hospital room? [] 1   [] 2   [x] 3   [] 4   6. Climbing 3-5 steps with a railing? [] 1   [] 2   [x] 3   [] 4   © 2007, Trustees of [address] [place] [address] [address] [id number], under license to [facility].  All rights reserved     Score:  Initial: 22 Most Recent: X (Date: -- )    Interpretation of Tool:  Represents activities that are increasingly more difficult (i.e. Bed mobility, Transfers, Gait). PLAN:   FREQUENCY/DURATION: PT Plan of Care: 3 times/week for duration of hospital stay or until stated goals are met, whichever comes first.    PROBLEM LIST:   (Skilled intervention is medically necessary to address:)  1. Decreased ADL/Functional Activities  2. Decreased Activity Tolerance   INTERVENTIONS PLANNED:   (Benefits and precautions of physical therapy have been discussed with the patient.)  1. Therapeutic Activity  2. Therapeutic Exercise/HEP  3. Neuromuscular Re-education  4. Gait Training  5. Manual Therapy  6. Education     TREATMENT:     EVALUATION: Low Complexity : (Untimed Charge)    TREATMENT:   ($$ Therapeutic Activity: 8-22 mins    )  Therapeutic Activity (10 Minutes): Therapeutic activity included Transfer Training, Ambulation on level ground, Sitting balance  and Standing balance to improve functional Mobility, Strength and Activity tolerance. Education on energy conservation techniques with handout and teach back.      TREATMENT GRID:  N/A    AFTER TREATMENT POSITION/PRECAUTIONS:  Chair, Needs within reach and RN notified    INTERDISCIPLINARY COLLABORATION:  RN/PCT and PT/PTA    TOTAL TREATMENT DURATION:  PT Patient Time In/Time Out  Time In: 0906  Time Out: 2005 [address] [place] [address], [facility]

## 2021-03-19 VITALS
HEIGHT: 69 IN | DIASTOLIC BLOOD PRESSURE: 108 MMHG | WEIGHT: 249 LBS | RESPIRATION RATE: 19 BRPM | SYSTOLIC BLOOD PRESSURE: 148 MMHG | BODY MASS INDEX: 36.88 KG/M2 | TEMPERATURE: 97.7 F | HEART RATE: 106 BPM | OXYGEN SATURATION: 91 %

## 2021-03-19 LAB
ANION GAP SERPL CALC-SCNC: 6 MMOL/L (ref 7–16)
BASOPHILS # BLD: 0.1 K/UL (ref 0–0.2)
BASOPHILS NFR BLD: 1 % (ref 0–2)
BUN SERPL-MCNC: 9 MG/DL (ref 6–23)
CALCIUM SERPL-MCNC: 8.6 MG/DL (ref 8.3–10.4)
CHLORIDE SERPL-SCNC: 108 MMOL/L (ref 98–107)
CO2 SERPL-SCNC: 25 MMOL/L (ref 21–32)
CREAT SERPL-MCNC: 0.66 MG/DL (ref 0.6–1)
DIFFERENTIAL METHOD BLD: ABNORMAL
EOSINOPHIL # BLD: 0.2 K/UL (ref 0–0.8)
EOSINOPHIL NFR BLD: 4 % (ref 0.5–7.8)
ERYTHROCYTE [DISTWIDTH] IN BLOOD BY AUTOMATED COUNT: 17.2 % (ref 11.9–14.6)
GLUCOSE SERPL-MCNC: 88 MG/DL (ref 65–100)
HCT VFR BLD AUTO: 30.3 % (ref 35.8–46.3)
HGB BLD-MCNC: 9.3 G/DL (ref 11.7–15.4)
IMM GRANULOCYTES # BLD AUTO: 0 K/UL (ref 0–0.5)
IMM GRANULOCYTES NFR BLD AUTO: 0 % (ref 0–5)
LYMPHOCYTES # BLD: 1.3 K/UL (ref 0.5–4.6)
LYMPHOCYTES NFR BLD: 21 % (ref 13–44)
MCH RBC QN AUTO: 25.5 PG (ref 26.1–32.9)
MCHC RBC AUTO-ENTMCNC: 30.7 G/DL (ref 31.4–35)
MCV RBC AUTO: 83 FL (ref 79.6–97.8)
MONOCYTES # BLD: 0.8 K/UL (ref 0.1–1.3)
MONOCYTES NFR BLD: 12 % (ref 4–12)
NEUTS SEG # BLD: 3.8 K/UL (ref 1.7–8.2)
NEUTS SEG NFR BLD: 62 % (ref 43–78)
NRBC # BLD: 0 K/UL (ref 0–0.2)
PLATELET # BLD AUTO: 209 K/UL (ref 150–450)
PMV BLD AUTO: 9.3 FL (ref 9.4–12.3)
POTASSIUM SERPL-SCNC: 3.6 MMOL/L (ref 3.5–5.1)
RBC # BLD AUTO: 3.65 M/UL (ref 4.05–5.2)
SODIUM SERPL-SCNC: 139 MMOL/L (ref 136–145)
WBC # BLD AUTO: 6.1 K/UL (ref 4.3–11.1)

## 2021-03-19 PROCEDURE — 77010033678 HC OXYGEN DAILY

## 2021-03-19 PROCEDURE — 94760 N-INVAS EAR/PLS OXIMETRY 1: CPT

## 2021-03-19 PROCEDURE — 74011250637 HC RX REV CODE- 250/637: Performed by: INTERNAL MEDICINE

## 2021-03-19 PROCEDURE — 80048 BASIC METABOLIC PNL TOTAL CA: CPT

## 2021-03-19 PROCEDURE — 74011250637 HC RX REV CODE- 250/637: Performed by: RADIOLOGY

## 2021-03-19 PROCEDURE — 2709999900 HC NON-CHARGEABLE SUPPLY

## 2021-03-19 PROCEDURE — 94640 AIRWAY INHALATION TREATMENT: CPT

## 2021-03-19 PROCEDURE — 36415 COLL VENOUS BLD VENIPUNCTURE: CPT

## 2021-03-19 PROCEDURE — 74011000250 HC RX REV CODE- 250: Performed by: HOSPITALIST

## 2021-03-19 PROCEDURE — 85025 COMPLETE CBC W/AUTO DIFF WBC: CPT

## 2021-03-19 PROCEDURE — 74011250637 HC RX REV CODE- 250/637: Performed by: FAMILY MEDICINE

## 2021-03-19 PROCEDURE — 97110 THERAPEUTIC EXERCISES: CPT

## 2021-03-19 PROCEDURE — 94761 N-INVAS EAR/PLS OXIMETRY MLT: CPT

## 2021-03-19 RX ORDER — POTASSIUM CHLORIDE 20 MEQ/1
20 TABLET, EXTENDED RELEASE ORAL
Status: COMPLETED | OUTPATIENT
Start: 2021-03-19 | End: 2021-03-19

## 2021-03-19 RX ORDER — HYDROCHLOROTHIAZIDE 25 MG/1
25 TABLET ORAL DAILY
Qty: 30 TAB | Refills: 0 | Status: SHIPPED | OUTPATIENT
Start: 2021-03-20

## 2021-03-19 RX ORDER — POTASSIUM CHLORIDE 750 MG/1
10 TABLET, EXTENDED RELEASE ORAL DAILY
Status: DISCONTINUED | OUTPATIENT
Start: 2021-03-20 | End: 2021-03-19 | Stop reason: HOSPADM

## 2021-03-19 RX ORDER — LEVALBUTEROL INHALATION SOLUTION 0.63 MG/3ML
0.63 SOLUTION RESPIRATORY (INHALATION)
Status: DISCONTINUED | OUTPATIENT
Start: 2021-03-19 | End: 2021-03-19 | Stop reason: HOSPADM

## 2021-03-19 RX ORDER — POTASSIUM CHLORIDE 750 MG/1
10 TABLET, EXTENDED RELEASE ORAL DAILY
Qty: 30 TAB | Refills: 0 | Status: SHIPPED | OUTPATIENT
Start: 2021-03-20

## 2021-03-19 RX ADMIN — PANTOPRAZOLE SODIUM 40 MG: 40 TABLET, DELAYED RELEASE ORAL at 05:08

## 2021-03-19 RX ADMIN — Medication 10 ML: at 14:25

## 2021-03-19 RX ADMIN — HYDROCHLOROTHIAZIDE 25 MG: 25 TABLET ORAL at 08:32

## 2021-03-19 RX ADMIN — APIXABAN 10 MG: 5 TABLET, FILM COATED ORAL at 08:32

## 2021-03-19 RX ADMIN — Medication 5 ML: at 05:08

## 2021-03-19 RX ADMIN — LEVALBUTEROL HYDROCHLORIDE 0.63 MG: 0.63 SOLUTION RESPIRATORY (INHALATION) at 08:20

## 2021-03-19 RX ADMIN — POTASSIUM CHLORIDE 20 MEQ: 1500 TABLET, EXTENDED RELEASE ORAL at 14:23

## 2021-03-19 RX ADMIN — HYDROCODONE BITARTRATE AND ACETAMINOPHEN 1 TABLET: 10; 325 TABLET ORAL at 05:08

## 2021-03-19 RX ADMIN — HYDROCODONE BITARTRATE AND ACETAMINOPHEN 1 TABLET: 10; 325 TABLET ORAL at 09:34

## 2021-03-19 RX ADMIN — DILTIAZEM HYDROCHLORIDE 120 MG: 120 CAPSULE, COATED, EXTENDED RELEASE ORAL at 08:32

## 2021-03-19 NOTE — PROGRESS NOTES
Date of Outreach Update:  Ana Barron was seen and assessed. MEWS Score: 2 (03/18/21 0802)  Vitals:    03/18/21 2011 03/18/21 2029 03/18/21 2041 03/18/21 2356   BP: (!) 193/115 139/88  (!) 158/84   Pulse: 98   89   Resp: 20   19   Temp: 97.6 °F (36.4 °C)   98.1 °F (36.7 °C)   SpO2: 90%  95% 94%   Weight:       Height:             Pain Assessment  Pain Intensity 1: 0 (03/18/21 0906)  Pain Location 1: Back  Pain Intervention(s) 1: Declines  Patient Stated Pain Goal: 0    Previous Outreach assessment has been reviewed. There have been no significant clinical changes since the completion of the last dated Outreach assessment. Will continue to follow up per outreach protocol.     Signed By:   Cristo Anaya    March 19, 2021 3:47 AM

## 2021-03-19 NOTE — PROGRESS NOTES
Problem: Falls - Risk of  Goal: *Absence of Falls  Description: Document Luli Nap Fall Risk and appropriate interventions in the flowsheet. Outcome: Progressing Towards Goal  Note: Fall Risk Interventions:  Mobility Interventions: PT Consult for mobility concerns, Patient to call before getting OOB, OT consult for ADLs         Medication Interventions: Evaluate medications/consider consulting pharmacy, Patient to call before getting OOB, Bed/chair exit alarm    Elimination Interventions: Call light in reach, Bed/chair exit alarm, Toileting schedule/hourly rounds              Problem: Patient Education: Go to Patient Education Activity  Goal: Patient/Family Education  Outcome: Progressing Towards Goal     Problem: Pressure Injury - Risk of  Goal: *Prevention of pressure injury  Description: Document Augustine Scale and appropriate interventions in the flowsheet.   Outcome: Progressing Towards Goal  Note: Pressure Injury Interventions:  Sensory Interventions: Assess changes in LOC    Moisture Interventions: Absorbent underpads    Activity Interventions: Assess need for specialty bed, Pressure redistribution bed/mattress(bed type), PT/OT evaluation    Mobility Interventions: PT/OT evaluation, Pressure redistribution bed/mattress (bed type)    Nutrition Interventions: Offer support with meals,snacks and hydration    Friction and Shear Interventions: Apply protective barrier, creams and emollients, HOB 30 degrees or less, Lift sheet, Minimize layers

## 2021-03-19 NOTE — ROUTINE PROCESS
Respiratory Care Services Policy Number: [id number] Title: Patient Care Assessment Protocol Effective Date: 01/1999 Revised Date: 05/2014, 04/2018, 12/2018, 07/2019 Reviewed Date: 06/2013/ 03/2015, 03/2016, 06/2017, 11/2020 Overview In an effort to improve quality and reduce costs of respiratory care at [facility], the Respiratory Department has developed a number of Patient Care Protocols. These protocols have been developed according to Brandon 3 and are utilized for those patients who are ordered respiratory therapy using therapeutic indications and standardized approaches for accomplishing objectives. Patient Care Protocols are intended to improve care by:  Defining the indications and standards of care agreed upon by the Pulmonary Medicine and [address] [place] [address] of [facility].  Training respiratory care practitioners to apply those criteria to individual patients and modify therapy as indicated by the protocols.  Documenting the indication and care plan as part of the initial ordering process.  Tapering or discontinuing treatments once the indication for therapy changes. The Patient Care Protocols shall be universally applied throughout the hospital as determined by  
the Pulmonary Medicine and [address] [place] [address]. Rationale for Patient Care Assessment Protocols:  Continuous Quality Improvement  Cost containment  Standardization of care  Enhanced continuity of care  Utilization review  Timely intervention The following patient care assessment protocols have been developed:  Aerosolized Medication Protocol  Bronchial Hygiene Protocol  Oxygen Protocol  CVRU Fast Track Weaning Protocol  Asthma Treatment Protocol ER 
 Pediatric Asthma Treatment Protocol ER  Alpha-1 Antitrypsin Deficiency Protocol  Prone Positioning Protocol  COPD Protocol  Home Oxygen Assessment Protocol  Ventilator Weaning Protocol  Lung Volume Expansion Protocol The Director of [address] [place] [address] oversees the Patient Care Assessment Program. The Respiratory Educator is responsible for protocol development and training. The Supervisor is responsible for implementation and  activities. Each patient with an order for respiratory treatments will receive an evaluation. Respiratory Care Practitioners (RCP's) will perform the evaluations. The same evaluation tool will be utilized for initial and follow-up assessments. If the patient does not meet criteria for ordered therapy, the therapy will be discontinued. If the patient demonstrates an adverse response to initially ordered therapy, the therapy will be discontinued and the physician will be contacted. Specific physician's orders that deviate from protocols and are deemed \"inappropriate\" or \"unsafe\" will be addressed with ordering physician and/or medical director as required. Respiratory Patient Care Assessment Protocols I. Policy: In an effort to provide quality patient care and effective utilization of services, physicians who order respiratory therapy will have their patients treated via the protocols established (see attached) Respiratory Care Practitioners (RCP's) will complete the initial assessment which will indicate patient needs,  the care plan developed and will performed within 24 hours of admission. Frequency of the therapy will be set according to the results of the respiratory therapy evaluation and frequency guidelines policy. Reassessment will be continued every 48 hours and more frequently as needed for the individual patient. II. Purpose: A. To provide a process that will allow for ongoing assessment and care plan modification for patients receiving respiratory services based on both objective and or subjective patient responses to interventions.  This process of protocol utilization will assist in patient care progression while eliminating the need for the physician to continually update respiratory therapy orders. B. To assure continuity of respiratory care that meets [facility] Clinical Practice Guidelines. III. Initiation:  Implement Respiratory Care Protocols for patients who are ordered by physician  
       to receive respiratory therapy procedures or for ventilator management. IV. Protocol: A. Upon receiving an order for therapy the RCP will review the patient's EMR (electronic medical record) for all pertinent information includin. Physician's order for therapy 2. Patient history and physical examination 3. Physician progress notes 4. Diagnostic. X-rays, PFT's, arterial blood gases etc. 
B. The RCP will perform a respiratory assessment in the following manner: 1. General observations: color, pattern and effort of breathing, chest expansion, (symmetrical and bilateral), level of consciousness and the ability to ambulate. 2. The RCP will assess patient's cough ability and determine if bronchial hygiene is needed. If patient is unable to produce sputum, at that time, the RCP should question the patient with regard to their sputum: production, color consistency, frequency and amount. 3. Auscultation: Using a stethoscope, the RCP will listen and note quality of breath sounds and presence or absence of adventitious breath sounds in all lung fields, both anteriorly and posteriorly. 4. Upon completing the EMR review and physical assessment, the RCP will document findings in the RT Assessment section of the EMR. The score level will be provided and will be used to determine the frequency of therapy. V. Indications: A. Bronchial Hygiene Protocol indications: 1. Potential for or presence of atelectasis. 1. Need for hydration and removal of retained secretions. 1. Need for improvement of cough effectiveness.  
1. Presence of conditions associated with disorder of pulmonary clearance: 
a. Cystic fibrosis b. Bronchiectasis 
c. Neuromuscular disease 
d. Obstructive lung diseases 
e. Restrictive lung diseases Aerosolized Medication(s) Protocol indications:Treatment of bronchospasm/wheezing 2. Improvement of mucociliary clearance 3. Treatment of stridor 4. History of Bronchiectasis, Asthma or COPD 
C. Oxygen Therapy Protocol indications: 1. Documented hypoxemia 2. Severe trauma 3. Acute myocardial infarction 4. Short-term therapy (e.g. post anesthesia recovery) D. CVRU Ventilator Weaning Protocol indications: 1. All mechanically ventilated surgical patients unless they have a no wean order. E. Asthma Treatment Protocol ER indications: 1. Patients 15years of age and older that have been triaged or diagnosed with   asthma exacerbation shall be indicated for the ER Asthma Treatment Protocol. A physician order will be required to initiate the protocol. F. Pediatric Asthma protocol in the ER indications: 1. Patients less than 15years old that have been triaged or diagnosed with asthma exacerbation shall be indicated for the Pediatric Asthma protocol. A physician order will be required to initiate the protocol. G. Alpha-1 Antitrypsin Deficiency Testing protocol indications: 1. Patients admitted and diagnosed with COPD. H. Prone Positioning Protocol indications: 
       1. Acute lung injury 2. Acute respiratory distress syndrome (ARDS) I. Respiratory Care COPD Protocol indications: 1. History of COPD in patient's records 2. Smoking history J. Home Oxygen Assessment Protocol indications: 1. Chronic lung disease 2. Cor pulmonale 3. Unable to wean to room air 48 hours prior to anticipated discharge. K.  Ventilator Weaning Protocol indications: 1. Patient's mechanically ventilated 2. Managed by intensivist 
L. Lung Volume Expansion Protocol indications: 1. Any patient at risk for pulmonary complications. VI. Maintenance: A. Timely patient assessment is an integral part of this protocol therefore the following will be applied: 1. All non- critical care patients will be evaluated upon receiving initial respiratory care orders within 24 hours and re-evaluated within 48 hours (or more as needed). 2. Orders requesting a Respiratory Consult will be responded to in the following manner: 
a. In patient emergency situations, the RCP assigned to the floor will respond immediately to the patient, provide an initial respiratory assessment, and contact the patient's physician as necessary for appropriate orders. b. In non-emergent situations, the RCP assigned to the floor will respond to the patient within 90 minutes and provide an initial respiratory assessment and contact patient's physician as necessary for appropriate orders. c. An RCP will provide a comprehensive assessment as soon as possible. 3. Upon completion of an evaluation, the RCP will complete documentation in the patient's EMR in the RT Assessment section. 4. The RCP who completes the assessment will document orders for therapy in the orders section of the patient's EMR selecting new order. Next, per protocol should be selected indicating it is a protocol order and sign orders should be selected to complete the process. The applicable protocol must be added to the progress note per Joint Commission guidelines. 5. The Pharmacy and Therapeutics (P&T) Committee has mandated that the medication Xopenex may be changed to unit dose albuterol without an order, except for those patients receiving Xopenex due to cardiac arrhythmias. 1. The dosage for these patients should be 0.63 mg. and may be changed from 1.25 mg. to 0.63 mg per P & T Committee by the RCP completing the assessment.  
6. Patients who are not experiencing cardiac arrhythmias, and are ordered Xopenex and Atrovent may be changed to Duoneb. VII. Safety: A. The following safety issues shall be monitored: 1. The RCP will perform hand hygiene per hospital policy utilizing Standard Precautions for all patients and following transmission-based isolation as indicated per hospital policy. 2. The RCP must exercise professional judgment in classifying the patient for frequency of therapy. 3. Appropriate classification of the patient will require an evaluation utilizing the Therapy Assessment Protocol Guidelines. 4. The RCP will confer with the physician concerning the care of the patient at any time questions or problems arise. 5. If during therapy, the patient exhibits no improvement, or deterioration in clinical status the RCP will notify the physician and the patient's nurse. VIII. Interventions: A. The patient's nurse is responsible concerning all items related to his/her care. Ongoing communication with nursing is essential to successful protocol management. B. The RCP recognizes the value of the team approach in meeting the patient's needs. Nursing input regarding the patient's pulmonary condition will be sought as needed. IX. Reportable conditions: The RCP will inform the physician if: 1. There are acute changes in patient's respiratory status. 2. The therapist is unable to determine appropriate care plan upon assessment. 3. The patient fails to reach therapeutic objective. 4. A change or additional medication is needed. X.  Patient Education: A. Patient will receive instruction on the followin. The treatment modality, including objectives and proper technique of therapy 2. Respiratory medications B. Documentation shall occur in the patient education section of the patient's EMR. XI. Documentation: Record all findings as described above in the patient's EMR. Related Protocols: A. Aerosolized Medication Protocol B. Bronchial Hygiene C. Oxygen Protocol D.  CVRU Fast Track Weaning Protocol E. Asthma Treatment protocol ER 
F. Alpha-1 antitrypsin Deficiency Protocol G. Prone Positioning Protocol H. Respiratory Care COPD Protocol I. Home Oxygen assessment Protocol J. Ventilator Weaning Protocols K. Volume Expansion protocol Indications      Frequency          Level A. Aerosol therapy 1.  q4h     Severe SOB, wheezing, unable to sleep 1 2.  qid, q4 wa or q6h   Moderate SOB, wheezing   2 3.  tid      Hx of asthma, or COPD mild wheezing,  
      or facilitate secretion removal              3 
 4.  bid      Asthma, or COPD, Intermittent wheezing 4 5. PRN, i.e. tid PRN, qid PRN Asthma, or COPD, occasional wheezing 5 B. Bronchopulmonary Hygiene 1. q4h             Copious secretions, SOB, unable to sleep 1 2. qid & PRN            Moderate amounts of secretions   2 3. tid           Small amounts of secretions and poor cough,   
           history of secretions    3 4. PRN, i.e. tid PRN, qid PRN     Breathing exercises, encourage cough only 4  
  
C. Oxygen Therapy     Follow hospital approved Oxygen Protocol Note: 
qid treatments are due 0800, 1200, 1600, and 2000. tid treatments are due 0800, 1400, and 2000 Q6h treatments are due 0800, 1400, 2000, 0200 Q4 wa teatments are due 0800, 1200, 1600, and 2000. Q4h treatments are due  0800, 1200, 1600, 2000, 0000, and 0400. The Level 1-5 rating system is only to be used as criteria for determining appropriate frequency of therapy. References:  
[address] Standard L    Respiratory Care Department Policy, Procedure and Protocol Guideline Manual, 1995, SAPNA Kirk. L  Therapist Driven Respiratory Care Protocols  A Practitioner's Guide for Criteria-Based Respiratory Care by Yvan Larose M.D., and SAPNA Montoya RRT. L  The rationale for therapist-driven protocols: an update. Respiratory Care 1998; 12:138-922 L Therapist Driven Respiratory Care Protocols  A Practitioner's Guide for Criteria-Based Respiratory Care by Damián Waters M.D., and SAPNA Bonilla, REINALDO. L The rationale for therapist-driven protocols: an update. Respiratory Care 1998; [id number]. N   [facility] Clinical Practice Guidelines. A. The RCP will perform a respiratory assessment in the following manner: 1. Perform hand hygiene per hospital policy utilizing Standard Precautions for all patients and following transmission-based isolation as indicated per policy. 2. Identify patient via ID bracelet verifying patient name and birth date. 3. General observations: color, pattern and effort of breathing, chest expansion, (symmetrical and bilateral), level of consciousness and the ability to ambulate. 4. The RCP will assess patients cough ability and determine if Nasotracheal suctioning is needed. If patient is unable to produce sputum, at that time, the RCP should question the patient with regard to their sputum: production, color consistency, frequency and amount. 5. Auscultation: Using a stethoscope, the RCP will listen and note quality of breath sounds and presence or absence of adventitious breath sounds in all lung fields, both anteriorly and posteriorly. 6. Upon completing the EMR review and physical assessment, the RCP will document findings in the RT Assessment section of the EMR. The score level will be provided and will be used to determine the frequency of therapy. V. Indications: A. Indications for Bronchial Hygiene Protocol will include: 1. Potential for or presence of atelectasis. 2. Need for hydration and removal of retained secretions. 3. Need for improvement of cough effectiveness. 4. Presence of conditions associated with disorder of pulmonary clearance: 
a. Cystic fibrosis b. Bronchiectasis B. Indications for Aerosolized Medication(s) Protocol should include: 1. Treatment of bronchospasm/wheezing 2. Improvement of mucociliary clearance 3. Treatment of stridor 4.  History of Asthma or COPD 
           C.  Indications for Oxygen Therapy Protocol should include: 1. Documented hypoxemia 2. Severe trauma 3. Acute myocardial infarction 4. Short-term therapy (e.g. post anesthesia recovery) VI. Maintenance: A. Timely patient assessment is an integral part of this protocol therefore the following will be applied: 1. All non- critical care patients will be evaluated upon receiving initial respiratory care orders within 24 hours and re-evaluated within 48 hours (or more as needed). 2. Orders requesting a Respiratory Consult will be responded to in the following manner: 
a. In patient emergency situations, the RCP assigned to the floor will respond immediately to the patient, provide an initial respiratory assessment, and contact the patients physician as necessary for appropriate orders. b. In non-emergent situations, the RCP assigned to the floor will respond to the patient within 90 minutes and provide an initial respiratory assessment and contact patients physician as necessary for appropriate orders. c. An RCP will provide a comprehensive assessment as soon as possible. 3. Upon completion of an evaluation, the RCP will complete documentation in the patients EMR in the RT Assessment section. 4. The RCP who completes the assessment will document orders for therapy in the orders section of the patients EMR selecting new order. Next, per protocol should be selected indicating it is a protocol order and sign orders should be selected to complete the process. 5. The Pharmacy and Therapeutics (P&T) Committee has mandated that the medication Xopenex may be changed to unit dose albuterol without an order, except for those patients receiving Xopenex due to cardiac arrhythmias. The dosage for these patients should be 0.63 mg. and may be changed from 1.25 mg. to 0.63 mg per P & T Committee by the RCP completing the assessment.  
6. Patients who are not experiencing cardiac arrhythmias, and are ordered Xopenex and Atrovent may be changed to Duoneb. VII. Safety: A. The following safety issues shall be monitored: 1. The RCP will perform hand hygiene per hospital policy utilizing Standard Precautions for all patients and following transmission-based isolation as indicated per hospital policy. 2. The RCP must exercise professional judgment in classifying the patient for frequency of therapy. 3. Appropriate classification of the patient will require an evaluation utilizing the Therapy Assessment Protocol Guidelines. 4. The RCP will confer with the physician concerning the care of the patient at any time questions or problems arise. 5. If during therapy, the patient exhibits no improvement or deterioration in clinical status the RCP will notify the physician and the patients nurse. VIII. Interventions: A. The patients nurse is responsible concerning all items related to his/her care. Ongoing communication with nursing is essential to successful protocol management. B. The RCP recognizes the value of the team approach in meeting the patients needs. Nursing input regarding the patients pulmonary condition will be sought as needed. IX. Reportable conditions: The RCP will inform the physician if: 1. There are acute changes in patients respiratory status. 2. The therapist is unable to determine appropriate care plan upon assessment. 3. The patient fails to reach therapeutic objective. 4. A change or additional medication is needed. X.  Patient Education: A. Patient will receive instruction on the followin. The treatment modality, including objectives and proper technique of therapy 2. Respiratory medications B. Documentation shall occur in the patient education section of the patients EMR. XI. Documentation: Record all findings as described above in the patients EMR. Related Protocols: A. Aerosolized Medication Protocol B.  Bronchial Hygiene C. Oxygen Protocol D. Volume Expansion/Secretion Clearance E. Ventilator Weaning Protocols References: 
[address] [facility] Standard L    Respiratory Care Department Policy, Procedure and Protocol Guideline Manual, 1995, SAPNA SOTO  Therapist Driven Respiratory Care Protocols  A Practitioners Guide for Criteria-Based Respiratory Care by Abbey Rodriguez M.D., and SUKHJINDER Horowitz  The rationale for therapist-driven protocols: an update. Respiratory Care 1998; 89:458-973 N   [facility] Clinical Practice Guidelines. Respiratory Care Services Policy Number: [id number]- Title: Aerosolized Medication Protocol Effective Date: 10/1998 Revised Date: 06/13, 03/16, 11/17, 07/19 Reviewed Date: 05/14/ 03/15 , 06/17, 5/18, 11/2020 I. Policy: The Aerosolized Medication Protocol shall by implemented by Respiratory Care Practitioners (RCP) for patients with orders to receive aerosol therapy with medication. II. Purpose: To open and maintain obstructed airways, the RCP, will utilize the following  
protocol to select the indicated aerosolized medication(s) and determine the most effective method of delivery to the patient. III. Patient Type: All patients who are determined to meet aerosolized medication criteria as  
       outlined in this protocol. IV. Responsibility: Director, [address], registered Respiratory Care Practitioners (RCP's) with documented competency in the performance of                                     respiratory therapeutic techniques. V. Equipment needed: C. Stethoscope D. Pulse oximeter E. AeroEclipse nebulizer F. Meter Dose Inhaler (MDI) VI. Protocol:  
C. The following conditions are accepted indications for aerosolized medication therapy. 5. Bronchospasm/wheezing 6. Impaired mucociliary clearance 7. Tracheobronchial mucosal congestion/and laryngeal stridor 8.  Diseases which commonly require aerosolized medication therapy include, but are not limited to: 
f. Asthma/reactive airway disease 
g. Bronchitis/emphysema (COPD) h. Cystic fibrosis 
i. Severe laryngitis/tracheitis 
j. Bronchiectasis 
k. Smoke inhalation or chemical trauma to the lung or upper airway 
l. Physical trauma to the upper airway 
m. Laryngotracheobronchitis 
n. Bronchiolitis 
o. Non-specific wheezing B. Indications for bronchodilator medications will include: 
d. Bronchospasm/ wheezing 
e. Asthma/reactive airway disease 
f. Chronic obstructive pulmonary disease 
g. Obstructive defect on pulmonary function testing C. Administration of medications 5. If a bronchodilator or any other type of respiratory medication is needed, a physician order must be indicated in the medication section in the patient's EMR. 6. When the physician specifies the medication and dosage at the time of request, the ordered medication will be used as part of the care plan. D. The following guidelines will be utilized in the evaluation and selection of the appropriate delivery device for indicated medication(s): 1. MDI is the preferred method of medication delivery via common canister. a. Patient should be alert/cooperative 
b. Able to perform 3 second breath hold. c. Patient may be changed to self-administer as appropriate. d. Patients in isolation will be provided an individual inhaler. 2. Unassisted aerosol (UA) is indicated if 
c. Ventilation is inadequate 
d. Patient is unable to perform MDI appropriately VII. Guidelines:  
Monitor patient's vital signs and evaluate patient's clinical status. The need to change medication and/or modality may be indicated by: 5. A pulse greater than 120 bpm, or if a pulse increase of 20 bpm occurs with bronchodilator medications. 6. Significant worsening of dyspnea or wheezing occurring during or within 30 minutes of discontinuing therapy.  
7. Worsening of patient's sensorium (e.g. patient becomes confused or obtunded, and unable to follow directions). 8. Worsening of patient's chest x-ray. 9. Change in sputum (e.g. increased pulmonary infiltrate, which might indicate need for volume expansion therapy). 10. Patient has difficulty coughing up secretions, which might indicate need for acetylcysteine and/or bronchial hygiene therapy. 11. Call physician immediately if dyspnea worsens and is not responsive to modifications allowed by protocol. VIII. Clinical Responsibility: 
B. The therapy assessment guidelines will be used to evaluate all patients receiving aerosolized medications with the exception of critical care areas. 2. RCP's will perform changes in therapy per protocol. 2. It will be the responsibility of RCP to provide instruction regarding respiratory medications, possible side effects, aerosol therapy and proper MDI technique, as well as, spacer usage to patients ordered MDI therapy. 2. Current therapy that is part of a patient's home regimen will not be discontinued. a. Provide spacer and educate patient on proper inhaler technique if needed. IX. Documentation A. Document assessment findings in the respiratory assessment section of the patient's EMR. B. Document changes in therapy per protocol in the respiratory orders section and in the care plan section of the patient's EMR. C. Document patient education in the patient education section of the patient's EMR. X. Outcome Criteria: 
B. Relief of wheezes and obstruction C. Improved cough and sputum color and consistency D. Improved chest x-ray E. Improved arterial oxygen tension and or SaO2 
F. Improved Peak Flow on asthmatic patients XI. Related Policy/Protocols: C. Respiratory Patient Care Protocols D. Bronchial Hygiene Therapy E. Oxygen Protocol [facility] Administration of Metered Dose Inhalers via a Common Canister [facility] Clinical Resources [facility] Aerosol Generating Procedures Reference: L - Respiratory Care Department Policy, Procedure and Protocol Guideline Manual, 1995, [employer] Kirk. L -  Therapist Driven Respiratory Care Protocols  A Practitioner's Guide for Criteria-Based Respiratory Care by Robert Britton M.D., and [employer] Mkcenzie, REINALDO. L - The rationale for therapist-driven protocols: an update. Respiratory Care 1998; [id number]. N -[facility] Clinical Practice Guidelines.

## 2021-03-19 NOTE — PROGRESS NOTES
Critical Care Outreach Nurse Progress Report:    Subjective: In to assess pt secondary to transfer from ICU. MEWS Score: 1 (03/19/21 0849)    Vitals:    03/18/21 2356 03/19/21 0325 03/19/21 0720 03/19/21 0849   BP: (!) 158/84 (!) 151/92  (!) 133/97   Pulse: 89 93  95   Resp: 19 20 18   Temp: 98.1 °F (36.7 °C) 97.9 °F (36.6 °C)  98.1 °F (36.7 °C)   SpO2: 94% 95% 96% 96%   Weight:       Height:            LAB DATA:    Recent Labs     03/19/21  0534 03/17/21  0346    140   K 3.6 3.9   * 110*   CO2 25 25   AGAP 6* 5*   GLU 88 87   BUN 9 8   CREA 0.66 0.61   GFRAA >60 >60   GFRNA >60 >60   CA 8.6 8.3        Recent Labs     03/19/21  0534 03/17/21  1021 03/17/21  0346   WBC 6.1 9.1 9.6   HGB 9.3* 9.7* 9.7*   HCT 30.3* 30.9* 31.4*    224 221        Objective:     Pain Intensity 1: 8 (03/19/21 0934)  Pain Location 1: Back  Pain Intervention(s) 1: Medication (see MAR)  Patient Stated Pain Goal: 0    Assessment: Patient alert and oriented. Respirations unlabored. Denies SOB on 3L NC. Has been OOB. VS, labs, and progress notes reviewed. Plan: Will continue to follow per outreach protocol.

## 2021-03-19 NOTE — PROGRESS NOTES
CM notes discharge order. Chart reviewed for discharge needs. Per therapy, patient does not need continued therapy at discharge. Ambulating respiratory saturation testing does not qualify patient for home O2. Patient will arrange transportation home. Care Management Interventions  PCP Verified by CM: Yes(Dr. Lui Contreras)  Mode of Transport at Discharge:  Other (see comment)(friend)  Transition of Care Consult (CM Consult): Discharge Planning  Discharge Durable Medical Equipment: No  Physical Therapy Consult: No  Occupational Therapy Consult: No  Speech Therapy Consult: No  Current Support Network: Own Home, Lives Alone  Confirm Follow Up Transport: Self  The Plan for Transition of Care is Related to the Following Treatment Goals : Return to baseline   Freedom of Choice List was Provided with Basic Dialogue that Supports the Patient's Individualized Plan of Care/Goals, Treatment Preferences and Shares the Quality Data Associated with the Providers?: Yes   Resource Information Provided?: No  Discharge Location  Discharge Placement: Home

## 2021-03-19 NOTE — PROGRESS NOTES
ACUTE PHYSICAL THERAPY GOALS:  (Developed with and agreed upon by patient and/or caregiver.)  1. Patient will perform bed mobility with INDEPENDENCE within 7 days. MET 3/18/2021  2. Patient will transfer bed to chair with INDEPENDENCE within 7 days. MET 3/19/2021  3. Patient will demonstrate GOOD DYNAMIC STANDING balance within 7 day(s). MET 3/19/2021  4. Patient will ambulate 300ft+ with independence within 7 days. 5. Patient will tolerate 25+ minutes of therapeutic activity/exercise and/or neuromuscular re-education while maintaining stable vitals to improve functional strength and activity tolerance within 7 days. MET 3/19/2021  6. Patient will verbalize 3 energy conservation techniques within 7 treatment days with 0 verbal cues. PHYSICAL THERAPY: Daily Note and AM Treatment Day # 2    Divya Jackson is a 44 y.o. female   PRIMARY DIAGNOSIS: Bilateral pulmonary embolism (HCC)  Bilateral pulmonary embolism (HCC) [I26.99]         ASSESSMENT:     REHAB RECOMMENDATIONS: CURRENT LEVEL OF FUNCTION:  (Most Recently Demonstrated)   Recommendation to date pending progress:  Setting:   No further skilled therapy   Equipment:    None Bed Mobility:   Independent  Sit to Stand:   Independent  Transfers:   Independent  Gait/Mobility:   Independent     ASSESSMENT:  Ms. Laila Rubio is a 44year old female admitted with bilateral pulmonary embolism and is s/p EKOS. Patient seen this AM for second PT treatment session. Reports feeling improved today. Continues to mobilize at independent level with regards to functional mobility and ambulation. Good dynamic balance today. Addressed activity tolerance with B UE and LE seated and standing therapeutic exercises noted below. MD placed patient on room air. Sp02 89-92% throughout exercise this AM. RN notified of patient Sp02. Patient denied SOB throughout activity. Good progress today; patient has met 4/6 goals. No anticipated PT needs at discharge.      SUBJECTIVE:   Ms. Jeannie Frances states, \"I feel better. \"    SOCIAL HISTORY/ LIVING ENVIRONMENT:   Lives independently, 1 level home with 4 steps to enter. At baseline, patient ambulates without utilizing an assistive device. Cares for 9year old grandson. Endorses sedentary lifestyle.       OBJECTIVE:     PAIN: VITAL SIGNS: LINES/DRAINS:   Pre Treatment: Pain Screen  Pain Scale 1: Numeric (0 - 10)  Pain Intensity 1: 0  Post Treatment: 0/10   None  O2 Device: Nasal cannula     MOBILITY: I Mod I S SBA CGA Min Mod Max Total  NT x2 Comments:   Bed Mobility    Rolling [] [] [] [] [] [] [] [] [] [x] []    Supine to Sit [x] [] [] [] [] [] [] [] [] [] []    Scooting [x] [] [] [] [] [] [] [] [] [] []    Sit to Supine [x] [] [] [] [] [] [] [] [] [] []    Transfers    Sit to Stand [x] [] [] [] [] [] [] [] [] [] []    Bed to Chair [x] [] [] [] [] [] [] [] [] [] []    Stand to Sit [x] [] [] [] [] [] [] [] [] [] []    I=Independent, Mod I=Modified Independent, S=Supervision, SBA=Standby Assistance, CGA=Contact Guard Assistance,   Min=Minimal Assistance, Mod=Moderate Assistance, Max=Maximal Assistance, Total=Total Assistance, NT=Not Tested    BALANCE: Good Fair+ Fair Fair- Poor NT Comments   Sitting Static [x] [] [] [] [] []    Sitting Dynamic [x] [] [] [] [] []              Standing Static [x] [] [] [] [] []    Standing Dynamic [x] [] [] [] [] []      GAIT: I Mod I S SBA CGA Min Mod Max Total  NT x2 Comments:   Level of Assistance [x] [] [] [] [] [] [] [] [] [] [] + Standing exercises   Distance 10ft within room    DME None    Gait Quality Normal mechanics; good dynamic balance    Weightbearing  Status N/A     I=Independent, Mod I=Modified Independent, S=Supervision, SBA=Standby Assistance, CGA=Contact Guard Assistance,   Min=Minimal Assistance, Mod=Moderate Assistance, Max=Maximal Assistance, Total=Total Assistance, NT=Not Tested    PLAN:   FREQUENCY/DURATION: PT Plan of Care: 3 times/week for duration of hospital stay or until stated goals are met.  TREATMENT:     TREATMENT:   ($$ Therapeutic Exercises: 23-37 mins    )  Therapeutic Exercise (25 Minutes): Therapeutic exercises noted below to improve functional activity tolerance and strength. Rest breaks provided throughout to recover breathing. Sp02 monitored throughout exercise: 89-92% on room air.      TREATMENT GRID:   Date:  3/19/2021 Date:   Date:     Activity/Exercise Parameters Parameters Parameters   Standing hip ab/adduction x10B     Seated hip flexion (marching) x20B     Seated LAQ x15B     Ankle dorsiflexion/plantarflexion x25B     Shoulder flexion x12B     Shoulder horizontal ab/adduction 2x15B (red theraband)     UE press (punches( x15B (red theraband)       AFTER TREATMENT POSITION/PRECAUTIONS:  Chair, Needs within reach and Student RN at bedside; Primary RN notified of MD turning patient to room air and Sp02 89-92% throughout exercise    INTERDISCIPLINARY COLLABORATION:  RN/PCT, PT/PTA and Student RN    TOTAL TREATMENT DURATION:  PT Patient Time In/Time Out  Time In: 1015  Time Out: MAXIMILIANO Perales

## 2021-03-19 NOTE — PROGRESS NOTES
Physical Therapy Note:    PT order for jobst compression. PT does not stock jobst compression stockings. These would have to be ordered from supplier or orthotist. Patient currently with compression stockings on per RN staff.  Thank you,    Vince Bruno, PT, DPT  3/19/2021

## 2021-03-19 NOTE — DISCHARGE SUMMARY
[address] [facility] Hospitalist Discharge Summary     Name:  Ana Barron    Age:50 y.o. Sex:female  :  1970       MRN:  [id number]       Admitting Physician: Vijay Bolden MD Admit Date: 3/15/2021  5:57 PM   Attending Physician: Graciella Baumgarten, DO  Primary Care Physician: Karina López MD       Discharge Physician: Sharrell Ganser, DO  Discharge date: 21   Discharged Condition: Stable    Indication for Admission:   Chief Complaint   Patient presents with    Chest Pain        Reasons for hospitalization:  Hospital Problems as of 3/19/2021 Date Reviewed: 2018          Codes Class Noted - Resolved POA    DVT (deep venous thrombosis) ([facility] ICD-10-CM: I82.409  ICD-9-CM: 453.40  3/17/2021 - Present Unknown        * (Principal) Bilateral pulmonary embolism ([facility] ICD-10-CM: I26.99  ICD-9-CM: 415.19  3/15/2021 - Present Yes        Leukocytosis ICD-10-CM: D72.829  ICD-9-CM: 288.60  3/15/2021 - Present Yes        Normocytic anemia ICD-10-CM: D64.9  ICD-9-CM: 285.9  3/15/2021 - Present Yes        Hyponatremia ICD-10-CM: E87.1  ICD-9-CM: 276.1  3/15/2021 - Present Yes        Hyperlipidemia (Chronic) ICD-10-CM: E78.5  ICD-9-CM: 272.4  3/15/2013 - Present Yes        IAN (obstructive sleep apnea) (Chronic) ICD-10-CM: G47.33  ICD-9-CM: 327.23  3/15/2013 - Present Yes        Asthma ICD-10-CM: J45.909  ICD-9-CM: 493.90  3/15/2013 - Present Yes        Schizophrenia ([facility] ICD-10-CM: F20.9  ICD-9-CM: 295.90  3/15/2013 - Present Yes        Morbid obesity ([facility] ICD-10-CM: E66.01  ICD-9-CM: 278.01  3/5/2013 - Present Yes        Bipolar 1 disorder ([facility] (Chronic) ICD-10-CM: F31.9  ICD-9-CM: 296.7  3/5/2013 - Present Yes        HTN (hypertension) (Chronic) ICD-10-CM: I10  ICD-9-CM: 401.9  3/5/2013 - Present Yes                 Discharge Diagnosis: Bilateral pulmonary emboli left lower extremity DVT with acute hypoxemic respiratory failure and uncontrolled hypertension.   Did Patient have Sepsis (YES OR NO): No this patient did not have sepsis      Hospital Course :  53-year-old -American female history of bipolar disorder obesity dyslipidemia obstructive sleep apnea prior DVT and IVC filter. Admitted with acute hypoxemic respiratory failure March 15 found to have left lower extremity DVT and bilateral PE with significant clot burden and underwent EKOS procedure on March 16. Initially required increased FiO2. Several days ago was started on Eliquis therapy and has tolerated well. She will need the full 1 week of 10 mg twice daily and then decrease dosage to 5 mg twice daily indefinitely. She will likely need medication lifelong. She was started on HCTZ with potassium replacement for uncontrolled hypertension during hospital stay. She should continue this medication will need electrolyte monitoring including potassium magnesium as well as creatinine monitoring. She appears to understand. Her Seroquel and Adderall were held during hospital stay. She is tells me she is no longer taking Seroquel and discussed possible thrombogenic potential of Adderall. She appears to understand and will discuss as outpatient. She may be discharged home for follow-up with primary care in 1 week and pulmonology of and as directed and interventional radiology only if directed for follow-up. Consults: IP CONSULT TO INTERVENTIONAL RADIOLOGY     Disposition: Home or Self Care  Diet: DIET REGULAR  Code Status: Full Code      Discharge Info:     Current Discharge Medication List      START taking these medications    Details   hydroCHLOROthiazide (HYDRODIURIL) 25 mg tablet Take 1 Tab by mouth daily. Qty: 30 Tab, Refills: 0      potassium chloride (KLOR-CON) 10 mEq tablet Take 1 Tab by mouth daily. Qty: 30 Tab, Refills: 0      apixaban (ELIQUIS) 5 mg tablet Take 2 twice daily for 5-1/2 days and then 1 twice daily.   Indications: blood clot in a deep vein of the extremities, a clot in the lung  Qty: 71 Tab, Refills: 0 CONTINUE these medications which have NOT CHANGED    Details   ALPRAZolam (XANAX) 2 mg tablet Take 2 mg by mouth three (3) times daily as needed for Anxiety. dilTIAZem (TIAZAC) 120 mg SR capsule Take 120 mg by mouth daily. Take / use AM day of surgery  per anesthesia protocols. amitriptyline (ELAVIL) 150 mg tablet Take 150 mg by mouth nightly. zolpidem (AMBIEN) 10 mg tablet Take 10 mg by mouth nightly. lidocaine 4 % patch Apply one patch to low back for 12 hours daily then remove  Qty: 10 Patch, Refills: 0      omeprazole (PRILOSEC) 20 mg capsule Take 40 mg by mouth two (2) times a day. STOP taking these medications       dextroamphetamine-amphetamine (AdderalL) 15 mg tablet Comments:   Reason for Stopping:         QUEtiapine (SEROQUEL) 300 mg tablet Comments:   Reason for Stopping:               Medications Discontinued During This Encounter   Medication Reason    heparin 25,000 units in dextrose 601 mL infusion DUPLICATE ORDER    QUEtiapine (SEROquel) tablet 400 mg Not A Current Medication    0.9% sodium chloride infusion     heparin 25,000 units in dextrose 500 mL infusion     0.9% sodium chloride infusion     fentaNYL citrate (PF) injection 25-50 mcg     midazolam (VERSED) injection 0.5-2 mg     alteplase (CATHFLO) 10 mg in 0.9% sodium chloride 500 mL infusion     alteplase (CATHFLO) 10 mg in 0.9% sodium chloride 500 mL infusion     heparin 25,000 units in dextrose 500 mL infusion     0.9% sodium chloride infusion     0.9% sodium chloride infusion 500 mL     heparin (PF) 2 units/ml in NS infusion 2,000-16,000 Units     lidocaine (XYLOCAINE) 20 mg/mL (2 %) injection  mg     fentaNYL citrate (PF) injection  mcg     midazolam (VERSED) injection 0.5-2 mg     labetaloL (NORMODYNE;TRANDATE) injection 20 mg     levalbuterol (XOPENEX) nebulizer soln 0.63 mg/3 mL     apixaban (ELIQUIS) 5 mg tablet          Follow Up Orders:   Follow-up Appointments   Procedures  FOLLOW UP VISIT Appointment in: One Week Follow up primary care next one week, follow up pulmonary and IR as directed     Follow up primary care next one week, follow up pulmonary and IR as directed     Standing Status:   Standing     Number of Occurrences:   1     Order Specific Question:   Appointment in     Answer: One Week       Follow-up Information     Follow up With Specialties Details Why Contact Info    Mago Jones MD Geriatric Medicine In 1 week Office currently closed. Please call for hospital followup appointment. [address]  [phone]              Discharge Exam:    Patient Vitals for the past 24 hrs:   Temp Pulse Resp BP SpO2   03/19/21 1155 97.7 °F (36.5 °C) (!) 106 19 (!) 148/108 91 %   03/19/21 0849 98.1 °F (36.7 °C) 95 18 (!) 133/97 96 %   03/19/21 0720     96 %   03/19/21 0325 97.9 °F (36.6 °C) 93 20 (!) 151/92 95 %   03/18/21 2356 98.1 °F (36.7 °C) 89 19 (!) 158/84 94 %   03/18/21 2041     95 %   03/18/21 2029    139/88    03/18/21 2011 97.6 °F (36.4 °C) 98 20 (!) 193/115 90 %   03/18/21 1602     94 %   03/18/21 1559  (!) 109      03/18/21 1504 98 °F (36.7 °C) (!) 104 20 (!) 141/95 90 %     Oxygen Therapy  O2 Sat (%): 91 % (03/19/21 1155)  Pulse via Oximetry: 84 beats per minute (03/19/21 0720)  O2 Device: Nasal cannula (03/19/21 0720)  Skin Assessment: Clean, dry, & intact (03/18/21 0800)  Skin Protection for O2 Device: No (03/18/21 0800)  O2 Flow Rate (L/min): 3 l/min(decreased to 2) (03/19/21 0720)  FIO2 (%): 32 % (03/17/21 1128)  ETCO2 (mmHg): 14 mmHg (03/17/21 1549)    Estimated body mass index is 36.77 kg/m² as calculated from the following:    Height as of this encounter: 5' 9\" (1.753 m). Weight as of this encounter: 112.9 kg (249 lb).     No intake or output data in the 24 hours ending 03/19/21 1339    *Note that automatically entered I/Os may not be accurate; dependent on patient compliance with collection and accurate  by assistants. Physical Exam:   General: No acute distress, speaking in full sentences, no use of accessory muscles   HEENT: Pupils equal and reactive to light and accommodation, oropharynx is clear   Neck: Supple, no lymphadenopathy, no JVD   Lungs: Clear to auscultation bilaterally   Cardiovascular: Regular rate and rhythm with normal S1 and S2   Abdomen: Soft, nontender, nondistended, normoactive bowel sounds   Extremities: No cyanosis clubbing or edema   Neuro: Nonfocal, A&O x3   Psych: Normal affect       All Labs from Last 24 Hrs:  Recent Results (from the past 24 hour(s))   METABOLIC PANEL, BASIC    Collection Time: 03/19/21  5:34 AM   Result Value Ref Range    Sodium 139 136 - 145 mmol/L    Potassium 3.6 3.5 - 5.1 mmol/L    Chloride 108 (H) 98 - 107 mmol/L    CO2 25 21 - 32 mmol/L    Anion gap 6 (L) 7 - 16 mmol/L    Glucose 88 65 - 100 mg/dL    BUN 9 6 - 23 MG/DL    Creatinine 0.66 0.6 - 1.0 MG/DL    GFR est AA >60 >60 ml/min/1.73m2    GFR est non-AA >60 >60 ml/min/1.73m2    Calcium 8.6 8.3 - 10.4 MG/DL   CBC WITH AUTOMATED DIFF    Collection Time: 03/19/21  5:34 AM   Result Value Ref Range    WBC 6.1 4.3 - 11.1 K/uL    RBC 3.65 (L) 4.05 - 5.2 M/uL    HGB 9.3 (L) 11.7 - 15.4 g/dL    HCT 30.3 (L) 35.8 - 46.3 %    MCV 83.0 79.6 - 97.8 FL    MCH 25.5 (L) 26.1 - 32.9 PG    MCHC 30.7 (L) 31.4 - 35.0 g/dL    RDW 17.2 (H) 11.9 - 14.6 %    PLATELET 891 463 - 495 K/uL    MPV 9.3 (L) 9.4 - 12.3 FL    ABSOLUTE NRBC 0.00 0.0 - 0.2 K/uL    DF AUTOMATED      NEUTROPHILS 62 43 - 78 %    LYMPHOCYTES 21 13 - 44 %    MONOCYTES 12 4.0 - 12.0 %    EOSINOPHILS 4 0.5 - 7.8 %    BASOPHILS 1 0.0 - 2.0 %    IMMATURE GRANULOCYTES 0 0.0 - 5.0 %    ABS. NEUTROPHILS 3.8 1.7 - 8.2 K/UL    ABS. LYMPHOCYTES 1.3 0.5 - 4.6 K/UL    ABS. MONOCYTES 0.8 0.1 - 1.3 K/UL    ABS. EOSINOPHILS 0.2 0.0 - 0.8 K/UL    ABS. BASOPHILS 0.1 0.0 - 0.2 K/UL    ABS. IMM.  GRANS. 0.0 0.0 - 0.5 K/UL       All Micro Results     None SARS-CoV-2 Lab Results  \"Novel Coronavirus\" Test: No results found for: COV2NT   \"Emergent Disease\" Test: No results found for: EDPR  \"SARS-COV-2\" Test: No results found for: XGCOVT  Rapid Test: No results found for: COVR         Diagnostic Imaging/Tests:   Ir Thrombolysis Transcath Non Coronary Or Intracranial    Result Date: 3/16/2021  PROCEDURE: Pulmonary angiography and placement of thrombolysis infusion catheter with internal ultrasound wire Procedural Personnel Attending physician(s): Louann Smith M.D. Pre-procedure diagnosis:  Very pleasant 78-year-old woman with 3 day history of worsening shortness of breath and right chest pain. CT scan last night confirms bilateral pulmonary emboli, left much worse than right. History is significant for left lower extremity DVT in 2010 treated with oral anticoagulation. Oralee Cheers IVC filter placed prophylactically 3/1/2013 prior to bariatric surgery. The original plan: 4 lower extremity venous ultrasound examination and, if negative, IVC filter retrieval approximately 3 months later. Unfortunately, the patient was lost to follow-up and was under the assumption that the filter should stay in permanently. The filter was present on a CT scan 8/16/2015 obtained at [facility]--no IVC or iliac vein thrombus appreciated on that CT scan. Post-procedure diagnosis:  Same Indication:  Pulmonary embolism Additional clinical history:  Mechanism of PE in light of an intact IVC filter is unknown. Complications:  No immediate complications. Angiography of the suprarenal inferior vena cava demonstrates no thrombus above the IVC filter. The infrarenal IVC was not evaluated today. Angiography of the left pulmonary artery confirms the presence of thrombus extending into the lower lobe pulmonary artery branches. Plan:  Transfer to intensive care unit. Heparin 300 units peripheral IV.   TPA 1 mg per hour through EKOS catheter in the left lower lobe pulmonary artery system. Anticipate infusion catheter removal in 24 hours. Bilateral lower extremity venous ultrasound to be obtained tonight or tomorrow. This will determine the need for lower extremity venous intervention. The patient will need to follow up in the office in 1-3 months to discuss IVC filter retrieval.  Recommend lifelong anticoagulation as this is at least the 2nd venous thromboembolism present. _______________________________________________________________ PROCEDURE SUMMARY: - Venous access with ultrasound guidance - Left main pulmonary angiography - Superselective pulmonary angiography: Performed as described below - Pulmonary arterial interventions as described below - Additional procedure(s): Suprarenal IVC venogram PROCEDURE DETAILS: Pre-procedure Consent:  Informed written and oral consent for the procedure was obtained after explanation of risks (including, but not limitted to:  hemorrhage, vascular injury, infection) benefits, and alternatives. The patient's questions were answered to his/her satisfaction. He/She stated understanding and requested that we proceed. Final verification:  A time-out identifying the patient and planned procedure was performed prior to this procedure. Preparation (MIPS):  Maximal sterile barrier technique (including:  Sterile Ultrasound probe cover, Sterile Ultrasound gel, cap, mask, Sterile Gown, Sterile Gloves, Sterile Drape, hand hygiene, and 2% chlorhexidine cutaneous antisepsis) was used. Anesthesia/sedation Level of anesthesia/sedation:  Moderate sedation (conscious sedation) Anesthesia/sedation administered by: Independent trained observer under attending supervision with continuous monitoring of the patient?s level of consciousness and physiologic status Total intra-service sedation time (minutes):  27 Access Local anesthesia was administered. The vessel was sonographically evaluated and determined to be patent.  Real time ultrasound was used to visualize needle entry into the vessel and a permanent image was stored. A 8-Ukrainian sheath was placed. Vein accessed:  Right internal jugular vein Access technique:  Micropuncture set with 21 gauge needle Inferior venacavogram The vena cava was catheterized using the following catheters:  6-Ukrainian Super Torque Pigtail Catheter and wires:  Bentson wire. Indication for angiography:  Diagnostic angiography - There was no prior catheter-based angiographic study available and a full diagnostic study was performed. The decision to intervene was based on the diagnostic study. Vessel catheterized:  Suprarenal inferior vena cava Findings:  Patent suprarenal inferior vena cava with typical unopacified renal blood inflow. Pulmonary angiography and interventions The pulmonary arterial system was catheterized using the following catheters: 6-Ukrainian Super Torque Pigtail Catheter, 6-Ukrainian  catheter and wires: Bentson wire, and Amplatz wire. It was impossible to intubate the right atrium using the 6-Ukrainian super torque catheter. Therefore, the 6-Ukrainian  catheter was used to direct the Bentson wire through the right atrium, right ventricle, and into the left pulmonary artery. Indication for angiography:  Non-diagnostic angiography - Angiography was performed for roadmapping, fluoroscopic guidance, or vessel measurement. Vessel catheterized:  Left main pulmonary artery Findings: The left main pulmonary artery is patent Vessel catheterized:  Left lower lobe pulmonary artery Findings:  Nonocclusive thrombus in the visualized left lower lobe pulmonary artery branches Pharmacologic thrombolysis Thrombolysis catheter location:  Left lower lobe pulmonary artery Thrombolysis catheter:  12 cm infusion length EKOS catheter Thrombolytic agent:  tPA Thrombolytic agent bolus dose (mg):  0 Thrombolytic agent continuous dose (mg/hr):  1 Closure The sheath was left in place. A sterile dressing was applied.  Contrast Contrast agent:  Isovue-300 Contrast volume (mL):  13 Radiation Dose Reference Air Kerma (Mary Hires):  373 mGy Dose Area Product/Kerma Area Product (DAP/TURNER/PKA): 8586.29cGy-cm2 Fluoroscopy Exposure Time:  5 minutes 30 seconds     Fluorographic Images:  1 inferior venacavogram; 2 pulmonary arteriograms Additional Details Additional description of procedure:  None Equipment details:  None Specimens removed:  None Estimated blood loss (mL):  Less than 10 Standardized report: [email or web address] Attestation Signer name: Louann Smith M.D. I attest that I performed the entire procedure. I reviewed the stored images and agree with the report as written. Ct Chest W Cont    Result Date: 3/15/2021  CT OF THE CHEST WITH INTRAVENOUS CONTRAST, 3/15/2021 Indication: Cough and shortness of breath for 3 days. Chest tightness last night. Symptoms worse today with exertion. . Comparison: None Technique:   2.5 mm axial scans from above the aortic arch to the lung bases following the uneventful administration of 70 mL of Isovue-370. Intravenous contrast was given to evaluate for pulmonary embolism. All CT scans performed at this facility use one or all of the following: Automated exposure control, adjustment of the mA and/or kVp according to patient's size, iterative reconstruction. Findings: The base of the neck is unremarkable in appearance. No clear adenopathy is seen although there are asymmetric with prominent left axillary lymph nodes without clear dysmorphism. These could be reactive. The thoracic aorta is normal in caliber. Opacification of the pulmonary arteries is adequate. Extensive bilateral pulmonary emboli are seen. These are seen extending from the distal left main pulmonary artery into the left lower lobe and left upper lobe. In addition, this is seen within right upper and lower lobe pulmonary arteries.   Evaluation with lung windows demonstrates left upper lobe infiltrate best appreciated on axial image 33. Given the patient's known pulmonary emboli, this is suspicious for a pulmonary infarction. Otherwise, mild dependent atelectatic appearing changes are seen. A small dependent left pleural effusion is seen. Lungs are expanded without evidence for pneumothorax. No acute osseous abnormality is seen. Limited evaluation of the upper abdomen demonstrates no acute abnormality. The distal tip of what appears to be an IVC filter is seen. The patient appears to be status post gastric bypass surgery. 1.  Bilateral pulmonary emboli with emboli extending from the distal left main pulmonary artery into arteries of the left upper and lower lobes and additional more distal emboli in the right upper and lower lobes. 2. Left upper lobe airspace changes and small dependent left pleural effusion likely representing reactive changes from pulmonary emboli. Specifically, airspace changes in the left upper lobe could represent a developing infarction. 3. Prominent left axillary lymph nodes. These are not clearly dysmorphic and favored to be reactive. Specifically, this can be seen in the setting of a recent vaccine. This report was made using voice transcription. Despite my best efforts to avoid any, transcription errors may persist. If there is any question about the accuracy of the report or need for clarification, then please call [phone], or text me through [email or web address] for clarification or correction. The critical finding of pulmonary emboli was discussed with Dr. Ki Ramirez by myself personally at 9:00 PM on 3/15/2021. Xr Chest Port    Result Date: 3/15/2021  CHEST X-RAY, single portable view  3/15/2021 History: Shortness of breath with exertion. Getting worse. Chest discomfort. Technique: Single frontal view of the chest. Comparison: Chest x-ray 5/26/2009. Findings: The cardiac silhouette is mild to moderately enlarged. This is a new finding from the prior remote comparison study.   The lungs are expanded without evidence for pneumothorax. No evolving consolidative airspace process or pleural effusion is seen. 1.  Mild to moderate cardiomegaly which is new when compared to the prior comparison study from 2009. This can be an early indicator for heart failure in the correct clinical setting. This report was made using voice transcription. Despite my best efforts to avoid any, transcription errors may persist. If there is any question about the accuracy of the report or need for clarification, then please call [phone], or text me through perfectserv for clarification or correction. Ir Kelly Bourgeois Inf Thromb Cessation    Result Date: 3/17/2021  PROCEDURE: Diagnostic venography Procedural Personnel Attending physician(s): Moustapha Segovia M.D. Pre-procedure diagnosis:  41-year-old woman admitted with bilateral PE and is completing ultrasound mediated, catheter directed pulmonary embolus tPA thrombolysis. Past history significant for retrievable IVC filter placement 3/1/2013 prior to bariatric surgery. Unfortunately, the patient was lost to follow-up and the filter has never been retrieved. History of left lower extremity DVT in 2010 with recurrent left lower extremity DVT this admission. Post-procedure diagnosis:  Same Indication:  Acute and chronic deep venous thrombosis (<14 days, > 28 days) Additional clinical history:  Patient states that she is breathing much easier and more comfortably since the initiation of thrombolytics yesterday. Complications: No immediate complications. .     Diagnostic venography demonstrates nonocclusive thrombus in the apex of the [facility] IVC filter. One of the stabilizing struts appears to be fractured and is located at the level of L2.   Widely patent right common femoral, external iliac, and common iliac veins; widely patent left common femoral and external iliac veins; narrowed proximal left common iliac vein with collateral venous formation extending from the left internal iliac to the right internal iliac vein; normal diameter inferior vena cava with nonocclusive thrombus at the apex of the infrarenal IVC filter. Venography yesterday confirms a normal diameter and patent suprarenal inferior vena cava. Plan:  One hour bedrest recovery with head of bed elevated. Begin Eliquis today. Recommend lifelong oral anticoagulation with Eliquis given the multiple episodes of left lower extremity DVT and the current episode of PE. Recommend not interrupting the anticoagulation for at least 3 months before attempting IVC filter retrieval.  The patient will follow-up in my office in one-2 months in order to discuss and planned IVC filter retrieval in the future. We will need general anesthesia and [employer] LASER on standby due to the age of the IVC filter. Anticipate left common iliac vein stent placement simultaneously for the treatment of May-Thurner Syndrome. _______________________________________________________________ PROCEDURE SUMMARY: - Selective venography as described below - Additional procedure(s):  None PROCEDURE DETAILS: Pre-procedure Consent:  Informed written and oral consent for the procedure was obtained after explanation of risks (including, but not limitted to:  hemorrhage, infection, vascular injury) benefits and alternatives. The patient's questions were answered to their satisfaction. They stated understanding and requested that we proceed. Final verification:  A time-out identifying the patient and planned procedure was performed prior to this procedure. Preparation (MIPS):  Maximal sterile barrier technique (including:  Sterile Ultrasound probe cover, Sterile Ultrasound gel, cap, mask, sterile gown, sterile gloves, sterile drape, hand hygiene, and 2% chlorhexidine cutaneous antisepsis) was used. Anesthesia/sedation Level of anesthesia/sedation:  Moderate sedation (conscious sedation) Anesthesia/sedation administered by:   Independent trained observer under attending supervision with continuous monitoring of the patient?s level of consciousness and physiologic status Total intra-service sedation time (minutes):  17 Access The pulmonary artery infusion catheter was removed without incident. Local anesthesia was administered around the indwelling vascular sheath. This sheath was then used for today's procedure. Vein accessed:  Right internal jugular vein Access technique: Indwelling sheath Venography The veins were catheterized using a 6-Arabic  catheter, 6-Arabic pigtail flush catheter, Amplatz wire, stiff Glidewire. Vein catheterized:  Left common iliac vein Findings: Moderate compression of the proximal left common iliac vein with retrograde flow in the left internal iliac vein and opacification of multiple cross pelvic collateral veins. All of these findings confirm May-Thurner Syndrome. Patent inferior vena cava with nonocclusive thrombus trapped at the apex of the [facility] IVC filter. One of the stabilizing struts is fractured and overlies the L2 vertebral body. No thrombus visible above the IVC filter. Normal contrast reflux into the right and left renal veins. Vein catheterized:  Left common femoral vein. Findings:  Widely patent left common femoral and external iliac veins. The left profunda femoral vein terminus is opacified and is patent Vein catheterized:  Right common femoral vein Findings:  Widely patent right common femoral, external iliac, and common iliac veins. No collateral venous filling identified. Single shot radiographs of the IVC filter were obtained in order to better visualize the fractured stabilizing strut. Closure The sheath was removed and hemostasis was achieved with manual compression. A sterile dressing was applied.  Contrast Contrast agent:  Isovue-300 Contrast volume (mL):  60 Radiation Dose Reference Air Kerma (Jerral Labor):  436mGy Dose Area Product/Kerma Area Product (DAP/TURNER/PKA):  13,121cGy-cm2 Fluoroscopy Exposure Time:  2 minutes 18 seconds Fluorographic Images:  4 venograms Additional Details Additional description of procedure:  None Equipment details:  None Specimens removed:  None Estimated blood loss (mL):  0 Standardized report: [id number] Attestation Signer name: Mian Wallis I attest that I personally performed the entire procedure. I reviewed the stored images and agree with the report as written. Duplex Lower Ext Venous Bilat    Result Date: 3/16/2021  BILATERAL LOWER EXTREMITY DOPPLER ULTRASOUND  3/16/2021 HISTORY:  Bilateral leg swelling. History of LLE DVT in .; Current pulmonary embolism. Technique: Grayscale, color Doppler and pulse wave Doppler images of the deep veins of lower extremities were obtained. FINDINGS: The deep veins of the right leg are patent, demonstrating normal intraluminal Doppler flow and venous compression. In the left leg, there is nonocclusive deep venous thrombus in the popliteal vein. Occlusive thrombus is present in both peroneal veins. DVT in the left leg present as described. The sonographer gave results to the patient's nurse.       Echocardiogram/EKG results:  Results for orders placed or performed during the hospital encounter of 03/15/21   2D ECHO COMPLETE ADULT (TTE) W OR [address] [facility]  One [address] [facility], [address] [facility]  [phone]    Transthoracic Echocardiogram  2D, M-mode, Doppler, and Color Doppler    Patient: Daylin Pugh  MR #: [id number]  : [id number]  Age: 44 years  Gender: Female  Study date: 16-Mar-2021  Account #: [de-identified]  Height: 69 in  Weight: 251.5 lb  BSA: 2.28 mï¾²  Status:Routine  Location: 909  BP: 153/ 92    Allergies: ACE INHIBITORS, LISINOPRIL    Sonographer:  Iva De Los Santos, Technologist  Group:  [facility]  Referring Physician:  Angel Greco MD  Reading Physician:  Hola Reyes MD    INDICATIONS: PE.    PROCEDURE: This was a routine study. A transthoracic echocardiogram was  performed. The study included complete 2D imaging, M-mode, complete spectral  Doppler, and color Doppler. Intravenous contrast (Definity) was administered   to  opacify the left ventricle. Technically difficult due to body habitus. Image  quality was adequate. LEFT VENTRICLE: Size was normal. Systolic function was normal. Ejection  fraction was estimated in the range of 55 % to 60 %. There was hypokinesis   with  distinct regional wall motion abnormalities. There was mild concentric  hypertrophy. Left ventricular diastolic function is indeterminant. Average   E/e'  of 8.2. RIGHT VENTRICLE: The size was normal. Systolic function was normal. Estimated  peak pressure was in the range of 25-30 mmHg. LEFT ATRIUM: The atrium was mildly dilated. RIGHT ATRIUM: Size was normal.    SYSTEMIC VEINS: IVC: The inferior vena cava was normal in size and course. AORTIC VALVE: The valve was trileaflet. The valve was mildly calcified and  mostly noted of the noncoronary cusp. There was no evidence for stenosis. There  was trace insufficiency. MITRAL VALVE: There was mild annular calcification. There was no evidence for  stenosis. There was trace regurgitation. TRICUSPID VALVE: The valve structure was normal. There was no evidence for  stenosis. There was trace regurgitation. PULMONIC VALVE: The valve structure was normal. There was no evidence for  stenosis. There was no insufficiency. PERICARDIUM: There was no pericardial effusion. A pericardial fat pad was  present. AORTA: The root exhibited normal size. SUMMARY:    -  Left ventricle: Systolic function was normal. Ejection fraction was  estimated in the range of 55 % to 60 %. There was mild concentric   hypertrophy. -  Left atrium: The atrium was mildly dilated.     SYSTEM MEASUREMENT TABLES    2D mode  LA Dimension (2D): 4.2 cm  Left Atrium Systolic Volume Index; Method of Disks, Biplane; 2D mode;: 35.1  ml/m2  IVS/LVPW (2D): 1.2  IVSd (2D): 1.5 cm  LVIDd (2D): 4.3 cm  LVIDs (2D): 3.3 cm  LVOT Area (2D): 4.2 cm2  LVPWd (2D): 1.2 cm  RVIDd (2D): 3.5 cm    M mode  AoR Diam (MM): 3.2 cm    Unspecified Scan Mode  Peak Grad; Mean; Antegrade Flow: 8 mm[Hg]  Vmax; Antegrade Flow: 140 cm/s  LVOT Diam: 2.3 cm    Prepared and signed by    Rowena Fleming MD  Signed 16-Mar-2021 14:18:44         EKG Results     Procedure 720 Value Units Date/Time    EKG 12 LEAD INITIAL [[id number]] Collected: 03/15/21 1705    Order Status: Completed Updated: 03/16/21 4250     Ventricular Rate 115 BPM      Atrial Rate 115 BPM      P-R Interval 160 ms      QRS Duration 96 ms      Q-T Interval 328 ms      QTC Calculation (Bezet) 453 ms      Calculated P Axis 73 degrees      Calculated R Axis 14 degrees      Calculated T Axis 67 degrees      Diagnosis --     Sinus tachycardia  Possible Left atrial enlargement  Borderline ECG  When compared with ECG of 12-MAR-2009 19:19,  No significant change was found  Confirmed by SERJIO HALL ([id number]) on 3/16/2021 7:12:37 AM            Results for orders placed or performed during the hospital encounter of 03/15/21   EKG, 12 LEAD, INITIAL   Result Value Ref Range    Ventricular Rate 115 BPM    Atrial Rate 115 BPM    P-R Interval 160 ms    QRS Duration 96 ms    Q-T Interval 328 ms    QTC Calculation (Bezet) 453 ms    Calculated P Axis 73 degrees    Calculated R Axis 14 degrees    Calculated T Axis 67 degrees    Diagnosis       Sinus tachycardia  Possible Left atrial enlargement  Borderline ECG  When compared with ECG of 12-MAR-2009 19:19,  No significant change was found  Confirmed by Joy Roberts ([id number]) on 3/16/2021 7:12:37 AM         Procedures done this admission:  * No surgery found *        Time spent in patient discharge planning and coordination 40 minutes.       Signed By: DO Katie Almaraz Hospitalist Service    March 19, 2021  1:39 PM

## 2021-03-19 NOTE — PROGRESS NOTES
[place] [facility] CRITICAL CARE OUTREACH NURSE PROGRESS REPORT      SUBJECTIVE: Follow up ICU transfer    MEWS Score: 2 (03/18/21 0802)  Vitals:    03/18/21 2011 03/18/21 2029 03/18/21 2041 03/18/21 2356   BP: (!) 193/115 139/88  (!) 158/84   Pulse: 98   89   Resp: 20   19   Temp: 97.6 °F (36.4 °C)   98.1 °F (36.7 °C)   SpO2: 90%  95% 94%   Weight:       Height:          LAB DATA:    Recent Labs     03/17/21  0346 03/16/21  0419    139   K 3.9 3.9   * 108*   CO2 25 26   AGAP 5* 5*   GLU 87 87   BUN 8 11   CREA 0.61 0.70   GFRAA >60 >60   GFRNA >60 >60   CA 8.3 8.8        Recent Labs     03/17/21  1021 03/17/21  0346 03/16/21  2149   WBC 9.1 9.6 10.2   HGB 9.7* 9.7* 9.5*   HCT 30.9* 31.4* 30.9*    221 223          OBJECTIVE: On arrival to room, I found patient to be sitting up in bed, talking to family. Pain Assessment  Pain Intensity 1: 0 (03/18/21 0906)  Pain Location 1: Back  Pain Intervention(s) 1: Declines  Patient Stated Pain Goal: 0                                 ASSESSMENT:  Patient is alert and oriented x4. Patient is hypertensive at this time and primary nurse at bedside with prn medication for hypertension. No oxygen in use at this time. Patient denies pain or shortness of breath at present time.      PLAN:  Will follow up per outreach protocol

## 2021-03-19 NOTE — DISCHARGE INSTRUCTIONS
Patient Education   Patient Education   Patient Education        Pulmonary Embolism: Care Instructions  Your Care Instructions     Pulmonary embolism is the sudden blockage of an artery in the lung. Blood clots in the deep veins of the leg or pelvis (deep vein thrombosis, or DVT) are the most common cause. These blood clots can travel to the lungs. Pulmonary embolism can be very serious. Because you have had one pulmonary embolism, you are at greater risk for having another one. But you can take steps to prevent another pulmonary embolism by following your doctor's instructions. You will probably take a prescription blood-thinning medicine to prevent blood clots. A blood thinner can stop a blood clot from growing larger and prevent new clots from forming. Follow-up care is a key part of your treatment and safety. Be sure to make and go to all appointments, and call your doctor if you are having problems. It's also a good idea to know your test results and keep a list of the medicines you take. How can you care for yourself at home? · Take your medicines exactly as prescribed. Call your doctor if you think you are having a problem with your medicine. You will get more details on the specific medicines your doctor prescribes. · If you are taking a blood thinner, be sure you get instructions about how to take your medicine safely. Blood thinners can cause serious bleeding problems. Preventing future pulmonary embolisms  · Exercise. Keep blood moving in your legs to keep clots from forming. If you are traveling by car, stop every hour or so. Get out and walk around for a few minutes. If you are traveling by bus, train, or plane, get out of your seat and walk up and down the aisles every hour or so. You also can do leg exercises while you are seated. Pump your feet up and down by pulling your toes up toward your knees then pointing them down.   · Get up out of bed as soon as possible after an illness or surgery. · Do not smoke. If you need help quitting, talk to your doctor about stop-smoking programs and medicines. These can increase your chances of quitting for good. · Check with your doctor before taking hormone or birth control pills. These may increase your risk of blood clots. · Ask your doctor about wearing compression stockings to help prevent blood clots in your legs. There are different types of stockings, and they need to fit right. So your doctor will recommend what you need. When should you call for help? Call 911 anytime you think you may need emergency care. For example, call if:    · You have shortness of breath.     · You have chest pain.     · You passed out (lost consciousness).     · You cough up blood. Call your doctor now or seek immediate medical care if:    · You have new or worsening pain or swelling in your leg. Watch closely for changes in your health, and be sure to contact your doctor if:    · You do not get better as expected. Where can you learn more? Go to http://www.gray.com/  Enter L071 in the search box to learn more about \"Pulmonary Embolism: Care Instructions. \"  Current as of: March 4, 2020               Content Version: 12.6  © 5491-7621 [employer]. Care instructions adapted under license by [employer] (which disclaims liability or warranty for this information). If you have questions about a medical condition or this instruction, always ask your healthcare professional. [place] [address] any warranty or liability for your use of this information. 8 Things You Can Do to Help Prevent Blood Clots  A blood clot is a clump of blood that forms in a blood vessel, such as a vein or an artery. If a clot gets stuck, it can stop blood flow and cause serious health problems, even death. That's why it's important to take steps to prevent a clot.    Take a blood-thinning medicine (called an anticoagulant), if prescribed. If your doctor prescribes medicine, take it as directed. Exercise your lower leg muscles. This helps keep the blood moving through your legs. Point your toes up toward your head so the calves of your legs are stretched, then relax. Repeat. This is a good exercise to do when you are sitting for long periods of time. Get up out of bed as soon as your doctor says it's okay. Being active is one of the best things you can do to prevent clots. Take plenty of breaks when you travel. On long car trips, stop the car and walk around every hour or so. On the bus, plane, or train, get out of your seat and walk up and down the aisle every hour, if you can. Be active. Try to get 30 minutes or more of activity on most days of the week. Don't smoke. Smoking can increase your risk of blood clots. If you need help quitting, talk to your doctor about stop-smoking programs and medicines. Check with your doctor about whether you should use hormone forms of birth control or hormone therapy. These may increase your risk of blood clots. Use compression stockings if your doctor prescribes them. These are specially fitted stockings that may prevent blood clots by keeping blood from pooling in your legs. Current as of: May 27, 2020               Content Version: 12.6  © 2006-2020 [employer]. Care instructions adapted under license by [employer] (which disclaims liability or warranty for this information). If you have questions about a medical condition or this instruction, always ask your healthcare professional. [place] [address] any warranty or liability for your use of this information. Deep Vein Thrombosis: Care Instructions  Overview     A deep vein thrombosis (DVT) is a blood clot in certain veins, usually in the legs, pelvis, or arms.  Blood clots in these veins need to be treated because they can get bigger, break loose, and travel through the bloodstream to the lungs. A blood clot in a lung can be life-threatening. The doctor may have given you a blood thinner (anticoagulant). A blood thinner can stop the blood clot from growing larger and prevent new clots from forming. You will need to take a blood thinner for 3 to 6 months or longer. The doctor has checked you carefully, but problems can develop later. If you notice any problems or new symptoms, get medical treatment right away. Follow-up care is a key part of your treatment and safety. Be sure to make and go to all appointments, and call your doctor if you are having problems. It's also a good idea to know your test results and keep a list of the medicines you take. How can you care for yourself at home? · Take your medicines exactly as prescribed. Call your doctor if you think you are having a problem with your medicine. · If you are taking a blood thinner, be sure you get instructions about how to take your medicine safely. Blood thinners can cause serious bleeding problems. · Wear compression stockings if your doctor recommends them. These stockings are tighter at the feet than on the legs. They may reduce pain and swelling in your legs. But there are different types of stockings, and they need to fit right. So your doctor will recommend what you need. · When you sit, use a pillow to raise the arm or leg that has the blood clot. Try to keep it above the level of your heart. When should you call for help? Call 911 anytime you think you may need emergency care. For example, call if:    · You passed out (lost consciousness).     · You have symptoms of a blood clot in your lung (called a pulmonary embolism). These include:  ? Sudden chest pain. ? Trouble breathing. ? Coughing up blood.    Call your doctor now or seek immediate medical care if:    · You have new or worse trouble breathing.     · You are dizzy or lightheaded, or you feel like you may faint.     · You have symptoms of a blood clot in your arm or leg. These may include:  ? Pain in the arm, calf, back of the knee, thigh, or groin. ? Redness and swelling in the arm, leg, or groin. Watch closely for changes in your health, and be sure to contact your doctor if:    · You do not get better as expected. Where can you learn more? Go to http://www.gray.com/  Enter Q282 in the search box to learn more about \"Deep Vein Thrombosis: Care Instructions. \"  Current as of: March 4, 2020               Content Version: 12.6  © 4303-0275 [employer]. Care instructions adapted under license by [employer] (which disclaims liability or warranty for this information). If you have questions about a medical condition or this instruction, always ask your healthcare professional. [address] any warranty or liability for your use of this information.

## 2021-03-19 NOTE — PROGRESS NOTES
Oxygen Qualifier       Room air: SpO2 with O2 and liter flow   Resting SpO2  93%     Ambulating SpO2  90%          Completed by:    Toy Hawkins, RT

## 2021-03-19 NOTE — PROGRESS NOTES
The pt d/c home with family care and HH. D/C info, scripts and instructions given by primary RN.  Pt assisted to lobby

## 2021-03-19 NOTE — PROGRESS NOTES
CM and pharmacist overheard attending speaking to primary RN regarding concerns of patient's non-compliance with Eliquis. All four parties in agreement that patient could benefit from [facility] RN to follow up on medication compliance. Patient is [facility] and CM would like patient to be seen within 24 hours of d/c to ensure compliance. Messages sent to in network [facility] agencies to find soonest availability; [facility], [facility], [facility], [facility]. Only availability tomorrow is with [facility]. Order and corresponding information faxed to admission liaison. Care Management Interventions  PCP Verified by CM: Yes(Dr. Dilshad Chino)  Mode of Transport at Discharge:  Other (see comment)(friend)  Transition of Care Consult (CM Consult): [address]: No  Reason Outside [place]: Unable to staff case  Discharge Durable Medical Equipment: No  Physical Therapy Consult: No  Occupational Therapy Consult: No  Speech Therapy Consult: No  Current Support Network: Own Home, Lives Alone  Confirm Follow Up Transport: Self  The Plan for Transition of Care is Related to the Following Treatment Goals : Return to baseline   Freedom of Choice List was Provided with Basic Dialogue that Supports the Patient's Individualized Plan of Care/Goals, Treatment Preferences and Shares the Quality Data Associated with the Providers?: Yes  [place] Resource Information Provided?: No  Discharge Location  Discharge Placement: Home with home health

## 2021-07-02 ENCOUNTER — TRANSCRIBE ORDER (OUTPATIENT)
Dept: REGISTRATION | Age: 51
End: 2021-07-02

## 2021-07-02 ENCOUNTER — HOSPITAL ENCOUNTER (OUTPATIENT)
Dept: LAB | Age: 51
Discharge: HOME | End: 2021-07-02
Payer: MEDICARE

## 2021-07-02 DIAGNOSIS — Q15.9 EYE ABNORMALITIES: Primary | ICD-10-CM

## 2021-07-02 DIAGNOSIS — Q15.9 EYE ABNORMALITIES: ICD-10-CM

## 2021-07-02 LAB
ANION GAP SERPL CALC-SCNC: 3 MMOL/L (ref 7–16)
BASOPHILS # BLD: 0.1 K/UL (ref 0–0.2)
BASOPHILS NFR BLD: 1 % (ref 0–2)
BUN SERPL-MCNC: 14 MG/DL (ref 6–23)
CALCIUM SERPL-MCNC: 8.4 MG/DL (ref 8.3–10.4)
CHLORIDE SERPL-SCNC: 111 MMOL/L (ref 98–107)
CO2 SERPL-SCNC: 28 MMOL/L (ref 21–32)
CREAT SERPL-MCNC: 0.75 MG/DL (ref 0.6–1)
DIFFERENTIAL METHOD BLD: ABNORMAL
EOSINOPHIL # BLD: 0.2 K/UL (ref 0–0.8)
EOSINOPHIL NFR BLD: 3 % (ref 0.5–7.8)
ERYTHROCYTE [DISTWIDTH] IN BLOOD BY AUTOMATED COUNT: 17.1 % (ref 11.9–14.6)
GLUCOSE SERPL-MCNC: 88 MG/DL (ref 65–100)
HCT VFR BLD AUTO: 33.1 % (ref 35.8–46.3)
HGB BLD-MCNC: 10.5 G/DL (ref 11.7–15.4)
IMM GRANULOCYTES # BLD AUTO: 0 K/UL (ref 0–0.5)
IMM GRANULOCYTES NFR BLD AUTO: 0 % (ref 0–5)
INR PPP: 1.1
LYMPHOCYTES # BLD: 1.9 K/UL (ref 0.5–4.6)
LYMPHOCYTES NFR BLD: 33 % (ref 13–44)
MCH RBC QN AUTO: 26.8 PG (ref 26.1–32.9)
MCHC RBC AUTO-ENTMCNC: 31.7 G/DL (ref 31.4–35)
MCV RBC AUTO: 84.4 FL (ref 79.6–97.8)
MONOCYTES # BLD: 0.5 K/UL (ref 0.1–1.3)
MONOCYTES NFR BLD: 8 % (ref 4–12)
NEUTS SEG # BLD: 3.1 K/UL (ref 1.7–8.2)
NEUTS SEG NFR BLD: 54 % (ref 43–78)
NRBC # BLD: 0 K/UL (ref 0–0.2)
PLATELET # BLD AUTO: 385 K/UL (ref 150–450)
PMV BLD AUTO: 9.7 FL (ref 9.4–12.3)
POTASSIUM SERPL-SCNC: 3.5 MMOL/L (ref 3.5–5.1)
PROTHROMBIN TIME: 14.4 SEC (ref 12.5–14.7)
RBC # BLD AUTO: 3.92 M/UL (ref 4.05–5.2)
SODIUM SERPL-SCNC: 142 MMOL/L (ref 136–145)
WBC # BLD AUTO: 5.7 K/UL (ref 4.3–11.1)

## 2021-07-02 PROCEDURE — 85025 COMPLETE CBC W/AUTO DIFF WBC: CPT

## 2021-07-02 PROCEDURE — 85610 PROTHROMBIN TIME: CPT

## 2021-07-02 PROCEDURE — 36415 COLL VENOUS BLD VENIPUNCTURE: CPT

## 2021-07-02 PROCEDURE — 80048 BASIC METABOLIC PNL TOTAL CA: CPT

## 2021-07-07 ENCOUNTER — ANESTHESIA EVENT (OUTPATIENT)
Dept: INTERVENTIONAL RADIOLOGY/VASCULAR | Age: 51
End: 2021-07-07
Payer: MEDICARE

## 2021-07-07 RX ORDER — ACETAMINOPHEN 500 MG
1000 TABLET ORAL ONCE
Status: CANCELLED | OUTPATIENT
Start: 2021-07-07 | End: 2021-07-07

## 2021-07-07 RX ORDER — SODIUM CHLORIDE, SODIUM LACTATE, POTASSIUM CHLORIDE, CALCIUM CHLORIDE 600; 310; 30; 20 MG/100ML; MG/100ML; MG/100ML; MG/100ML
100 INJECTION, SOLUTION INTRAVENOUS CONTINUOUS
Status: CANCELLED | OUTPATIENT
Start: 2021-07-07 | End: 2021-07-08

## 2021-07-07 RX ORDER — MIDAZOLAM HYDROCHLORIDE 1 MG/ML
2 INJECTION, SOLUTION INTRAMUSCULAR; INTRAVENOUS
Status: CANCELLED | OUTPATIENT
Start: 2021-07-07 | End: 2021-07-08

## 2021-07-07 RX ORDER — LIDOCAINE HYDROCHLORIDE 10 MG/ML
0.1 INJECTION INFILTRATION; PERINEURAL AS NEEDED
Status: CANCELLED | OUTPATIENT
Start: 2021-07-07

## 2021-07-08 ENCOUNTER — HOSPITAL ENCOUNTER (OUTPATIENT)
Dept: INTERVENTIONAL RADIOLOGY/VASCULAR | Age: 51
Discharge: HOME | End: 2021-07-08
Attending: RADIOLOGY
Payer: MEDICARE

## 2021-07-08 ENCOUNTER — ANESTHESIA (OUTPATIENT)
Dept: INTERVENTIONAL RADIOLOGY/VASCULAR | Age: 51
End: 2021-07-08
Payer: MEDICARE

## 2021-07-08 VITALS
SYSTOLIC BLOOD PRESSURE: 174 MMHG | DIASTOLIC BLOOD PRESSURE: 94 MMHG | HEIGHT: 69 IN | WEIGHT: 245 LBS | OXYGEN SATURATION: 100 % | HEART RATE: 77 BPM | RESPIRATION RATE: 16 BRPM | TEMPERATURE: 98 F | BODY MASS INDEX: 36.29 KG/M2

## 2021-07-08 DIAGNOSIS — I82.409 POSTOPERATIVE DEEP VEIN THROMBOSIS (DVT), SEQUELA (HCC): Primary | ICD-10-CM

## 2021-07-08 DIAGNOSIS — Z95.828 PRESENCE OF IVC FILTER: ICD-10-CM

## 2021-07-08 DIAGNOSIS — T81.89XS POSTOPERATIVE DEEP VEIN THROMBOSIS (DVT), SEQUELA (HCC): Primary | ICD-10-CM

## 2021-07-08 PROCEDURE — 37193 REM ENDOVAS VENA CAVA FILTER: CPT

## 2021-07-08 PROCEDURE — 74011250636 HC RX REV CODE- 250/636: Performed by: ANESTHESIOLOGY

## 2021-07-08 PROCEDURE — C1894 INTRO/SHEATH, NON-LASER: HCPCS

## 2021-07-08 PROCEDURE — 74011250636 HC RX REV CODE- 250/636: Performed by: RADIOLOGY

## 2021-07-08 PROCEDURE — 77030013058 HC DEV INFL ANGIO BSC -B

## 2021-07-08 PROCEDURE — 77030010507 HC ADH SKN DERMBND J&J -B

## 2021-07-08 PROCEDURE — C1874 STENT, COATED/COV W/DEL SYS: HCPCS

## 2021-07-08 PROCEDURE — C1887 CATHETER, GUIDING: HCPCS

## 2021-07-08 PROCEDURE — C1773 RET DEV, INSERTABLE: HCPCS

## 2021-07-08 PROCEDURE — C1769 GUIDE WIRE: HCPCS

## 2021-07-08 PROCEDURE — C1725 CATH, TRANSLUMIN NON-LASER: HCPCS

## 2021-07-08 PROCEDURE — 76060000034 HC ANESTHESIA 1.5 TO 2 HR

## 2021-07-08 PROCEDURE — C2629 INTRO/SHEATH, LASER: HCPCS

## 2021-07-08 PROCEDURE — 77030004521 HC CATH ANGI DX COOK -B

## 2021-07-08 PROCEDURE — 77030020270 HC MISC VASC IMPL

## 2021-07-08 PROCEDURE — 74011000250 HC RX REV CODE- 250: Performed by: ANESTHESIOLOGY

## 2021-07-08 PROCEDURE — 74011250637 HC RX REV CODE- 250/637: Performed by: RADIOLOGY

## 2021-07-08 PROCEDURE — 74011000250 HC RX REV CODE- 250: Performed by: RADIOLOGY

## 2021-07-08 PROCEDURE — 74011000636 HC RX REV CODE- 636: Performed by: RADIOLOGY

## 2021-07-08 RX ORDER — HYDROMORPHONE HYDROCHLORIDE 2 MG/ML
0.5 INJECTION, SOLUTION INTRAMUSCULAR; INTRAVENOUS; SUBCUTANEOUS
Status: DISCONTINUED | OUTPATIENT
Start: 2021-07-08 | End: 2021-07-12 | Stop reason: HOSPADM

## 2021-07-08 RX ORDER — SODIUM CHLORIDE, SODIUM LACTATE, POTASSIUM CHLORIDE, CALCIUM CHLORIDE 600; 310; 30; 20 MG/100ML; MG/100ML; MG/100ML; MG/100ML
INJECTION, SOLUTION INTRAVENOUS
Status: DISCONTINUED | OUTPATIENT
Start: 2021-07-08 | End: 2021-07-08 | Stop reason: HOSPADM

## 2021-07-08 RX ORDER — PROPOFOL 10 MG/ML
INJECTION, EMULSION INTRAVENOUS AS NEEDED
Status: DISCONTINUED | OUTPATIENT
Start: 2021-07-08 | End: 2021-07-08 | Stop reason: HOSPADM

## 2021-07-08 RX ORDER — GUAIFENESIN 100 MG/5ML
81 LIQUID (ML) ORAL
Status: COMPLETED | OUTPATIENT
Start: 2021-07-08 | End: 2021-07-08

## 2021-07-08 RX ORDER — DIPHENHYDRAMINE HYDROCHLORIDE 50 MG/ML
12.5 INJECTION, SOLUTION INTRAMUSCULAR; INTRAVENOUS
Status: DISCONTINUED | OUTPATIENT
Start: 2021-07-08 | End: 2021-07-12 | Stop reason: HOSPADM

## 2021-07-08 RX ORDER — LIDOCAINE HYDROCHLORIDE 20 MG/ML
20-200 INJECTION, SOLUTION INFILTRATION; PERINEURAL
Status: DISCONTINUED | OUTPATIENT
Start: 2021-07-08 | End: 2021-07-08

## 2021-07-08 RX ORDER — HYDROCODONE BITARTRATE AND ACETAMINOPHEN 10; 325 MG/1; MG/1
1 TABLET ORAL
Status: DISCONTINUED | OUTPATIENT
Start: 2021-07-08 | End: 2021-07-12 | Stop reason: HOSPADM

## 2021-07-08 RX ORDER — CEFAZOLIN SODIUM/WATER 2 G/20 ML
2 SYRINGE (ML) INTRAVENOUS ONCE
Status: COMPLETED | OUTPATIENT
Start: 2021-07-08 | End: 2021-07-08

## 2021-07-08 RX ORDER — LIDOCAINE HYDROCHLORIDE 20 MG/ML
INJECTION, SOLUTION EPIDURAL; INFILTRATION; INTRACAUDAL; PERINEURAL AS NEEDED
Status: DISCONTINUED | OUTPATIENT
Start: 2021-07-08 | End: 2021-07-08 | Stop reason: HOSPADM

## 2021-07-08 RX ORDER — NALOXONE HYDROCHLORIDE 0.4 MG/ML
0.1 INJECTION, SOLUTION INTRAMUSCULAR; INTRAVENOUS; SUBCUTANEOUS
Status: DISCONTINUED | OUTPATIENT
Start: 2021-07-08 | End: 2021-07-12 | Stop reason: HOSPADM

## 2021-07-08 RX ORDER — OXYCODONE HYDROCHLORIDE 5 MG/1
5 TABLET ORAL
Status: DISCONTINUED | OUTPATIENT
Start: 2021-07-08 | End: 2021-07-09 | Stop reason: HOSPADM

## 2021-07-08 RX ORDER — SODIUM CHLORIDE, SODIUM LACTATE, POTASSIUM CHLORIDE, CALCIUM CHLORIDE 600; 310; 30; 20 MG/100ML; MG/100ML; MG/100ML; MG/100ML
75 INJECTION, SOLUTION INTRAVENOUS CONTINUOUS
Status: DISCONTINUED | OUTPATIENT
Start: 2021-07-08 | End: 2021-07-12 | Stop reason: HOSPADM

## 2021-07-08 RX ORDER — HALOPERIDOL 5 MG/ML
1 INJECTION INTRAMUSCULAR
Status: DISCONTINUED | OUTPATIENT
Start: 2021-07-08 | End: 2021-07-09 | Stop reason: HOSPADM

## 2021-07-08 RX ORDER — HEPARIN SODIUM 200 [USP'U]/100ML
1000-8000 INJECTION, SOLUTION INTRAVENOUS
Status: DISCONTINUED | OUTPATIENT
Start: 2021-07-08 | End: 2021-07-08

## 2021-07-08 RX ORDER — SODIUM CHLORIDE 9 MG/ML
150 INJECTION, SOLUTION INTRAVENOUS CONTINUOUS
Status: DISCONTINUED | OUTPATIENT
Start: 2021-07-08 | End: 2021-07-09 | Stop reason: HOSPADM

## 2021-07-08 RX ORDER — FLUMAZENIL 0.1 MG/ML
0.2 INJECTION INTRAVENOUS
Status: DISCONTINUED | OUTPATIENT
Start: 2021-07-08 | End: 2021-07-12 | Stop reason: HOSPADM

## 2021-07-08 RX ORDER — HYDROCODONE BITARTRATE AND ACETAMINOPHEN 7.5; 325 MG/1; MG/1
1 TABLET ORAL
Qty: 20 TABLET | Refills: 0 | Status: SHIPPED | OUTPATIENT
Start: 2021-07-08 | End: 2021-07-13

## 2021-07-08 RX ORDER — HEPARIN SODIUM 1000 [USP'U]/ML
INJECTION, SOLUTION INTRAVENOUS; SUBCUTANEOUS AS NEEDED
Status: DISCONTINUED | OUTPATIENT
Start: 2021-07-08 | End: 2021-07-08 | Stop reason: HOSPADM

## 2021-07-08 RX ADMIN — IOPAMIDOL 140 ML: 612 INJECTION, SOLUTION INTRAVENOUS at 12:53

## 2021-07-08 RX ADMIN — HEPARIN SODIUM 4000 UNITS: 1000 INJECTION, SOLUTION INTRAVENOUS; SUBCUTANEOUS at 12:40

## 2021-07-08 RX ADMIN — LIDOCAINE HYDROCHLORIDE 80 MG: 20 INJECTION, SOLUTION EPIDURAL; INFILTRATION; INTRACAUDAL; PERINEURAL at 11:40

## 2021-07-08 RX ADMIN — HEPARIN SODIUM 8000 UNITS: 200 INJECTION, SOLUTION INTRAVENOUS at 12:55

## 2021-07-08 RX ADMIN — HYDROCODONE BITARTRATE AND ACETAMINOPHEN 1 TABLET: 10; 325 TABLET ORAL at 13:36

## 2021-07-08 RX ADMIN — CEFAZOLIN 2 G: 1 INJECTION, POWDER, FOR SOLUTION INTRAVENOUS at 11:45

## 2021-07-08 RX ADMIN — PROPOFOL 250 MG: 10 INJECTION, EMULSION INTRAVENOUS at 11:40

## 2021-07-08 RX ADMIN — ASPIRIN 81 MG: 81 TABLET, CHEWABLE ORAL at 14:22

## 2021-07-08 RX ADMIN — LIDOCAINE HYDROCHLORIDE 100 MG: 20 INJECTION, SOLUTION INFILTRATION; PERINEURAL at 12:20

## 2021-07-08 RX ADMIN — SODIUM CHLORIDE, SODIUM LACTATE, POTASSIUM CHLORIDE, AND CALCIUM CHLORIDE: 600; 310; 30; 20 INJECTION, SOLUTION INTRAVENOUS at 11:31

## 2021-07-08 NOTE — ANESTHESIA POSTPROCEDURE EVALUATION
* No procedures listed *.    general    Anesthesia Post Evaluation      Multimodal analgesia: multimodal analgesia used between 6 hours prior to anesthesia start to PACU discharge  Patient location during evaluation: PACU  Patient participation: complete - patient participated  Level of consciousness: awake and alert  Pain management: adequate  Airway patency: patent  Anesthetic complications: no  Cardiovascular status: acceptable  Respiratory status: acceptable  Hydration status: acceptable  Post anesthesia nausea and vomiting:  controlled  Final Post Anesthesia Temperature Assessment:  Normothermia (36.0-37.5 degrees C)      INITIAL Post-op Vital signs:   Vitals Value Taken Time   /97 07/08/21 1408   Temp 36.7 °C (98 °F) 07/08/21 1403   Pulse 76 07/08/21 1410   Resp 16 07/08/21 1403   SpO2 100 % 07/08/21 1410   Vitals shown include unvalidated device data.

## 2021-07-08 NOTE — PROGRESS NOTES
TRANSFER - OUT REPORT:    Verbal report given to Jerry Zhou on Panfilo Holly  being transferred to PACU [place] [facility] for routine post - op       Report consisted of patients Situation, Background, Assessment and   Recommendations(SBAR). Information from the following report(s) SBAR and Procedure Summary was reviewed with the receiving nurse. Opportunity for questions and clarification was provided. General anesthesia    Pt tolerated procedure well.      Visit Vitals  BP (!) 142/88 (BP 1 Location: Right arm, BP Patient Position: At rest)   Pulse 86   Temp 98.9 °F (37.2 °C)   Resp 16   Ht 5' 9\" (1.753 m)   Wt 111.1 kg (245 lb)   SpO2 100%   Breastfeeding No   BMI 36.18 kg/m²     Past Medical History:   Diagnosis Date    Anemia     no supplements     Anxiety     Managed with meds     Asthma     none since stopped smoking-- 6 months ago, Inhalers prn     Benzodiazepine (tranquilizer) overdose 20 yrs ago    BMI 36.0-36.9,adult     Chronic pain     back, knees    Depression     medication    GERD (gastroesophageal reflux disease)     controlled with medication    Hypertension     controlled with med    Obese     bmi =35    IAN (obstructive sleep apnea)     DOES NOT WEAR CPAP    Panic attacks     S/P gastric bypass 2013    wt loss 60lbs-- gained all back    Schizophrenia (HCC)     Seizures (HCC)     one seizure several yrs ago- undetermined cause     Suicide, attempted 20 YRS AGO    Thromboembolus ([facility] [address] 2010    left leg-- IVF PLACED    Thyroid disease     HYPOTHYROIDISM     Peripheral IV 07/08/21 Right Hand (Active)   Site Assessment Clean, dry, & intact 07/08/21 1140   Phlebitis Assessment 0 07/08/21 0845   Infiltration Assessment 0 07/08/21 0845   Dressing Status Clean, dry, & intact 07/08/21 1140   Hub Color/Line Status Blue 07/08/21 0845       Peripheral IV 07/08/21 Right Hand (Active)

## 2021-07-08 NOTE — H&P
Department of Interventional Radiology  [phone]    History and Physical    Patient:  Porter Cleaning MRN:  [id number]  SSN:  [id number]    YOB: 1970  Age:  46 y.o. Sex:  female      Primary Care Provider:  Peewee Naik MD  Referring Physician:  Juan Vega MD    Subjective:     Chief Complaint: DVT    History of the Present Illness: The patient is a 46 y.o. female with hx DVT and a retained Rory Endicott IVC filter, May Thurner syndrome who presents for venogram, IVC filter retrieval, left CIV stent placement. Filter placed 2013 prophylactically prior to bariatric surgery due to hx LLE DVT and obesity. She never presented for follow up for filter retrieval.  S/p PA thrombolysis 3/2021. She is taking Eliquis as prescribed, last dose yesterday. She c/o intermittent LLE swelling after a long day standing at work. NPO.     Past Medical History:   Diagnosis Date    Anemia     no supplements     Anxiety     Managed with meds     Asthma     none since stopped smoking-- 6 months ago, Inhalers prn     Benzodiazepine (tranquilizer) overdose 20 yrs ago    BMI 36.0-36.9,adult     Chronic pain     back, knees    Depression     medication    GERD (gastroesophageal reflux disease)     controlled with medication    Hypertension     controlled with med    Obese     bmi =35    IAN (obstructive sleep apnea)     DOES NOT WEAR CPAP    Panic attacks     S/P gastric bypass 2013    wt loss 60lbs-- gained all back    Schizophrenia ([address]     Seizures ([address]     one seizure several yrs ago- undetermined cause     Suicide, attempted 21 YRS AGO    Thromboembolus ([address] 2010    left leg-- IVF PLACED    Thyroid disease     HYPOTHYROIDISM     Past Surgical History:   Procedure Laterality Date    COLONOSCOPY N/A 3/7/2018    COLONOSCOPY  BMI 39 performed by Omega Easton [facility] END[facility]COPY    HX BREAST REDUCTION      HX GASTRECTOMY  3/4/13    Sleeve    HX HYSTERECTOMY      HX OOPHORECTOMY      HX TUBAL LIGATION      HX VASCULAR ACCESS  2010    IVF PLACED    IR REMOVE THER TXCATH INF THROMB CESSATION  3/17/2021    IR THROMBOLYSIS TRANSCATH NON CORONARY OR INTRACRANIAL  3/16/2021        Review of Systems:    Pertinent items are noted in the History of Present Illness. Prior to Admission medications    Medication Sig Start Date End Date Taking? Authorizing Provider   hydroCHLOROthiazide (HYDRODIURIL) 25 mg tablet Take 1 Tab by mouth daily. 3/20/21   India Montenegro, DO   potassium chloride (KLOR-CON) 10 mEq tablet Take 1 Tab by mouth daily. 3/20/21   India Montenegro, DO   apixaban (ELIQUIS) 5 mg tablet Take 2 twice daily for 5-1/2 days and then 1 twice daily. Indications: blood clot in a deep vein of the extremities, a clot in the lung 3/19/21   India Montenegro, DO   lidocaine 4 % patch Apply one patch to low back for 12 hours daily then remove 2/29/20   Mariela Guerra NP   omeprazole (PRILOSEC) 20 mg capsule Take 40 mg by mouth two (2) times a day. Provider, Historical   ALPRAZolam [place]) 2 mg tablet Take 2 mg by mouth three (3) times daily as needed for Anxiety. Provider, Historical   dilTIAZem (TIAZAC) 120 mg SR capsule Take 120 mg by mouth daily. Take / use AM day of surgery  per anesthesia protocols. Provider, Historical   amitriptyline (ELAVIL) 150 mg tablet Take 150 mg by mouth nightly. Provider, Historical   zolpidem (AMBIEN) 10 mg tablet Take 10 mg by mouth nightly.     Provider, Historical        Allergies   Allergen Reactions    Ace Inhibitors Anaphylaxis    Lisinopril Swelling     Lip/Mouth swelling       Family History   Problem Relation Age of Onset    Cancer Father     Breast Cancer Neg Hx      Social History     Tobacco Use    Smoking status: Former Smoker     Packs/day: 0.25     Years: 12.00     Pack years: 3.00     Quit date: 6/3/2018     Years since quitting: 3.0    Smokeless tobacco: Never Used    Tobacco comment: current social smoker (noted 12/04/18)   Substance Use Topics    Alcohol use:  Yes     Alcohol/week: 0.0 standard drinks     Comment: occ        Objective:       Physical Examination:    Vitals:    07/08/21 0841   BP: (!) 142/88   Pulse: 86   Resp: 16   Temp: 98.9 °F (37.2 °C)   SpO2: 100%   Weight: 111.1 kg (245 lb)   Height: 5' 9\" (1.753 m)       Pain Assessment  Pain Intensity 1: 0 (07/08/21 0839)               HEART: regular rate and rhythm  LUNG: clear to auscultation bilaterally  ABDOMEN: normal findings: soft, non-tender  EXTREMITIES: warm, no edema    Laboratory:     Lab Results   Component Value Date/Time    Sodium 142 07/02/2021 12:28 PM    Sodium 139 03/19/2021 05:34 AM    Potassium 3.5 07/02/2021 12:28 PM    Potassium 3.6 03/19/2021 05:34 AM    Chloride 111 (H) 07/02/2021 12:28 PM    Chloride 108 (H) 03/19/2021 05:34 AM    CO2 28 07/02/2021 12:28 PM    CO2 25 03/19/2021 05:34 AM    Anion gap 3 (L) 07/02/2021 12:28 PM    Anion gap 6 (L) 03/19/2021 05:34 AM    Glucose 88 07/02/2021 12:28 PM    Glucose 88 03/19/2021 05:34 AM    BUN 14 07/02/2021 12:28 PM    BUN 9 03/19/2021 05:34 AM    Creatinine 0.75 07/02/2021 12:28 PM    Creatinine 0.66 03/19/2021 05:34 AM    GFR est AA >60 07/02/2021 12:28 PM    GFR est AA >60 03/19/2021 05:34 AM    GFR est non-AA >60 07/02/2021 12:28 PM    GFR est non-AA >60 03/19/2021 05:34 AM    Calcium 8.4 07/02/2021 12:28 PM    Calcium 8.6 03/19/2021 05:34 AM    Magnesium 1.6 (L) 10/07/2008 05:45 AM    Magnesium 1.8 10/06/2008 05:34 AM    Phosphorus 3.4 10/07/2008 05:45 AM    Phosphorus 3.1 10/06/2008 05:34 AM    Albumin 2.7 (L) 03/15/2021 05:08 PM    Albumin 3.4 (L) 10/04/2008 12:45 AM    Protein, total 8.8 (H) 03/15/2021 05:08 PM    Protein, total 7.8 10/04/2008 12:45 AM    Globulin 6.1 (H) 03/15/2021 05:08 PM    Globulin 4.4 (H) 10/04/2008 12:45 AM    A-G Ratio 0.4 (L) 03/15/2021 05:08 PM    A-G Ratio 0.8 (L) 10/04/2008 12:45 AM    ALT (SGPT) 33 03/15/2021 05:08 PM    ALT (SGPT) 54 10/04/2008 12:45 AM     Lab Results   Component Value Date/Time    WBC 5.7 07/02/2021 12:28 PM    WBC 6.1 03/19/2021 05:34 AM    HGB 10.5 (L) 07/02/2021 12:28 PM    HGB 9.3 (L) 03/19/2021 05:34 AM    HCT 33.1 (L) 07/02/2021 12:28 PM    HCT 30.3 (L) 03/19/2021 05:34 AM    PLATELET 169 78/01/1586 12:28 PM    PLATELET 664 29/38/0701 05:34 AM     Lab Results   Component Value Date/Time    aPTT 36.3 (H) 03/17/2021 10:21 AM    aPTT 35.9 (H) 03/17/2021 03:46 AM    Prothrombin time 14.4 07/02/2021 12:28 PM    Prothrombin time 14.8 (H) 03/17/2021 03:46 AM    INR 1.1 07/02/2021 12:28 PM    INR 1.1 03/17/2021 03:46 AM       Assessment:     Hx LLE DVT, IVC filter, [facility] Problems  Date Reviewed: 12/20/2018    None          Plan:     Planned Procedure:  Venogram, IVC filter retrieval, probably left CIV stent placement with anesthesia. Risks, benefits, and alternatives reviewed with patient and she agrees to proceed with the procedure.       Signed By: Jacqueline Pritchard PA-C     July 8, 2021

## 2021-07-08 NOTE — ANESTHESIA PREPROCEDURE EVALUATION
Anesthetic History               Review of Systems / Medical History  Patient summary reviewed and pertinent labs reviewed    Pulmonary        Sleep apnea: No treatment  Smoker (Former)  Asthma        Neuro/Psych     seizures    Psychiatric history (Depression, Anxiety, Schizophrenia      )     Cardiovascular    Hypertension              Exercise tolerance: >4 METS  Comments:  Thromboembolus-IVC filter   GI/Hepatic/Renal     GERD          Comments: S/P gastric bypass Endo/Other      Hypothyroidism  Obesity     Other Findings              Physical Exam    Airway  Mallampati: III  TM Distance: > 6 cm  Neck ROM: decreased range of motion   Mouth opening: Normal     Cardiovascular  Regular rate and rhythm,  S1 and S2 normal,  no murmur, click, rub, or gallop             Dental         Pulmonary  Breath sounds clear to auscultation               Abdominal         Other Findings            Anesthetic Plan    ASA: 3  Anesthesia type: general            Anesthetic plan and risks discussed with: Patient

## 2021-07-08 NOTE — PROGRESS NOTES
IR Nurse Pre-Procedure Checklist Part 2          Consent signed: Yes    H&P complete:  Yes    Antibiotics: Yes    Airway/Mallampati Done: Yes    Shaved: Yes    Pregnancy Form:Yes    Patient Position: Yes    MD Side: Yes     Biopsy Worksheet: Not applicable    Specimen Medium: Not applicable    Patient in IR Suite 1 for procedure. Anesthesia has assumed care of patient for the duration of procedure. Please see anesthesia record for documentation.

## 2021-07-08 NOTE — PROCEDURES
Department of Interventional Radiology  [phone]        Interventional Radiology Brief Procedure Note    Patient: Andrew Rascon MRN: [id number]  SSN: [id number]    YOB: 1970  Age: 46 y.o. Sex: female      Date of Procedure: 7/8/2021    Pre-Procedure Diagnosis: IVC Filter no longer needed. Tolerating lifelong Eliquis OAC. Chronic left below knee DVT. May-Thurner Syndrome. Post-Procedure Diagnosis: SAME    Procedure(s): IVC and L Pelvic Venogram.  IVC Filter Retrieval.  L CIV stent placement. Brief Description of Procedure: as above    Performed By: Noah Deleon MD     Assistants: None    Anesthesia:General    Estimated Blood Loss: 40 cc    Specimens:  None    Implants:  Metal stent. Findings: On IVCF stabilizing strut is fractured and left in the retroperitoneum. Excellent flow thru the stented LCIV. Complications: None    Recommendations: 1 hour bedrest.  Begin 1 Baby ASA each day for at least 3 months. Rx for Norco sent. Follow Up: Office of Virtual follow up in 2-4 weeks.      Signed By: Noah Deleon MD     July 8, 2021

## 2021-07-08 NOTE — DISCHARGE INSTRUCTIONS
[address]  Department of Interventional Radiology  [facility]  [phone] Office  [phone] Fax       Inferior Vena Cava Filter Removal Discharge Instructions    General Information:   Today you had an inferior vena cava filter removed. This was removed because it was determined that you no longer needed this filter. Home Care Instructions: You can resume your regular diet and medication regimen. Do not drink alcohol, drive, or make any important legal decisions in the next 24 hours. Do not lift anything heavier than a gallon of milk, or do anything strenuous for the next 24 hours. You will notice a dressing on your neck or groin. This was the insertion site for the retrieval of the device. Keep the site covered until the puncture site has totally healed. You make take a shower with the bandage, but do cover it with plastic wrap. Do not immerse yourself in water until the wound has totally healed. After your puncture wound heals, there is no special care that is needed. You may resume your normal level of activity slowly, after about one week of light activity. Call If:   You should call your Physician and/or the Radiology Nurse if you have any bleeding other than a small spot on your bandage. Please call if you have any signs of infection; fever, swelling, or increased pain at the site. Call if you have any questions of how to take care of your wound. Follow-Up Instructions: Please see your ordering doctor as he/she has requested. To Reach Us: If you have any questions about your procedure, please call the Interventional Radiology department at [phone]. After business hours (5pm) and weekends, call the answering service at [phone] and ask for the Radiologist on call to be paged. Si tiene Preguntas acerca del procedimiento, por favor llame al departamento de Radiología Intervencional al [phone]. Después de horas de oficina (5 pm) y los fines de [place], llamar al [place] [phone] y pregunte por el Rad[facility]ologo de [place] [facility]. Interventional Radiology General Nurse Discharge    After general anesthesia or intravenous sedation, for 24 hours or while taking prescription Narcotics:  · Limit your activities  · Do not drive and operate hazardous machinery  · Do not make important personal or business decisions  · Do  not drink alcoholic beverages  · If you have not urinated within 8 hours after discharge, please contact your surgeon on call. * Please give a list of your current medications to your Primary Care Provider. * Please update this list whenever your medications are discontinued, doses are     changed, or new medications (including over-the-counter products) are added. * Please carry medication information at all times in case of emergency situations. These are general instructions for a healthy lifestyle:    No smoking/ No tobacco products/ Avoid exposure to second hand smoke  Surgeon General's Warning:  Quitting smoking now greatly reduces serious risk to your health. Obesity, smoking, and sedentary lifestyle greatly increases your risk for illness  A healthy diet, regular physical exercise & weight monitoring are important for maintaining a healthy lifestyle    You may be retaining fluid if you have a history of heart failure or if you experience any of the following symptoms:  Weight gain of 3 pounds or more overnight or 5 pounds in a week, increased swelling in our hands or feet or shortness of breath while lying flat in bed. Please call your doctor as soon as you notice any of these symptoms; do not wait until your next office visit.     Recognize signs and symptoms of STROKE:  F-face looks uneven    A-arms unable to move or move unevenly    S-speech slurred or non-existent    T-time-call 911 as soon as signs and symptoms begin-DO NOT go       Back to bed or wait to see if you get better-TIME IS BRAIN.     Patient Signature:  Date: 7/8/2021  Discharging Nurse: Kaylene James RN

## 2021-09-27 ENCOUNTER — HOSPITAL ENCOUNTER (OUTPATIENT)
Dept: LAB | Age: 51
Discharge: HOME | End: 2021-09-27
Payer: MEDICARE

## 2021-09-29 ENCOUNTER — ANESTHESIA EVENT (OUTPATIENT)
Dept: INTERVENTIONAL RADIOLOGY/VASCULAR | Age: 51
End: 2021-09-29
Payer: MEDICARE

## 2021-09-29 RX ORDER — ACETAMINOPHEN 500 MG
1000 TABLET ORAL ONCE
Status: CANCELLED | OUTPATIENT
Start: 2021-09-29 | End: 2021-09-29

## 2021-09-29 RX ORDER — SODIUM CHLORIDE 0.9 % (FLUSH) 0.9 %
5-40 SYRINGE (ML) INJECTION AS NEEDED
Status: CANCELLED | OUTPATIENT
Start: 2021-09-29

## 2021-09-29 RX ORDER — SODIUM CHLORIDE, SODIUM LACTATE, POTASSIUM CHLORIDE, CALCIUM CHLORIDE 600; 310; 30; 20 MG/100ML; MG/100ML; MG/100ML; MG/100ML
150 INJECTION, SOLUTION INTRAVENOUS CONTINUOUS
Status: CANCELLED | OUTPATIENT
Start: 2021-09-29 | End: 2021-09-30

## 2021-09-29 RX ORDER — LIDOCAINE HYDROCHLORIDE 10 MG/ML
0.1 INJECTION INFILTRATION; PERINEURAL AS NEEDED
Status: CANCELLED | OUTPATIENT
Start: 2021-09-29

## 2021-09-29 RX ORDER — FAMOTIDINE 20 MG/1
20 TABLET, FILM COATED ORAL ONCE
Status: CANCELLED | OUTPATIENT
Start: 2021-09-29 | End: 2021-09-29

## 2021-09-29 RX ORDER — SODIUM CHLORIDE 0.9 % (FLUSH) 0.9 %
5-40 SYRINGE (ML) INJECTION EVERY 8 HOURS
Status: CANCELLED | OUTPATIENT
Start: 2021-09-29

## 2021-09-29 RX ORDER — FENTANYL CITRATE 50 UG/ML
100 INJECTION, SOLUTION INTRAMUSCULAR; INTRAVENOUS ONCE
Status: CANCELLED | OUTPATIENT
Start: 2021-09-29 | End: 2021-09-29

## 2021-09-29 RX ORDER — MIDAZOLAM HYDROCHLORIDE 1 MG/ML
2 INJECTION, SOLUTION INTRAMUSCULAR; INTRAVENOUS
Status: CANCELLED | OUTPATIENT
Start: 2021-09-29 | End: 2021-09-30

## 2021-09-30 ENCOUNTER — HOSPITAL ENCOUNTER (OUTPATIENT)
Dept: INTERVENTIONAL RADIOLOGY/VASCULAR | Age: 51
Discharge: HOME | End: 2021-09-30
Attending: RADIOLOGY
Payer: MEDICARE

## 2021-09-30 ENCOUNTER — ANESTHESIA (OUTPATIENT)
Dept: INTERVENTIONAL RADIOLOGY/VASCULAR | Age: 51
End: 2021-09-30
Payer: MEDICARE

## 2021-09-30 VITALS
BODY MASS INDEX: 35.55 KG/M2 | WEIGHT: 240 LBS | SYSTOLIC BLOOD PRESSURE: 160 MMHG | HEART RATE: 80 BPM | HEIGHT: 69 IN | DIASTOLIC BLOOD PRESSURE: 84 MMHG | RESPIRATION RATE: 18 BRPM | TEMPERATURE: 98 F | OXYGEN SATURATION: 98 %

## 2021-09-30 DIAGNOSIS — G89.18 POSTOPERATIVE PAIN OF EXTREMITY: Primary | ICD-10-CM

## 2021-09-30 DIAGNOSIS — M79.609 POSTOPERATIVE PAIN OF EXTREMITY: Primary | ICD-10-CM

## 2021-09-30 DIAGNOSIS — R60.9 EDEMA: ICD-10-CM

## 2021-09-30 LAB
COVID-19 RAPID TEST, COVR: NOT DETECTED
SARS COV-2, XPGCVT: NEGATIVE
SARS-COV-2, COV2: NORMAL
SOURCE, COVRS: NORMAL

## 2021-09-30 PROCEDURE — 74011250636 HC RX REV CODE- 250/636: Performed by: ANESTHESIOLOGY

## 2021-09-30 PROCEDURE — C1769 GUIDE WIRE: HCPCS

## 2021-09-30 PROCEDURE — C1894 INTRO/SHEATH, NON-LASER: HCPCS

## 2021-09-30 PROCEDURE — 77030040830 HC CATH URETH FOL MDII -A

## 2021-09-30 PROCEDURE — 74011250636 HC RX REV CODE- 250/636: Performed by: RADIOLOGY

## 2021-09-30 PROCEDURE — 77030011229 HC DIL VESL COON COOK -A

## 2021-09-30 PROCEDURE — C1887 CATHETER, GUIDING: HCPCS

## 2021-09-30 PROCEDURE — 76060000037 HC ANESTHESIA 3 TO 3.5 HR

## 2021-09-30 PROCEDURE — C1725 CATH, TRANSLUMIN NON-LASER: HCPCS

## 2021-09-30 PROCEDURE — 87635 SARS-COV-2 COVID-19 AMP PRB: CPT

## 2021-09-30 PROCEDURE — 77030013058 HC DEV INFL ANGIO BSC -B

## 2021-09-30 PROCEDURE — 74011000250 HC RX REV CODE- 250: Performed by: RADIOLOGY

## 2021-09-30 PROCEDURE — 74011000250 HC RX REV CODE- 250: Performed by: ANESTHESIOLOGY

## 2021-09-30 PROCEDURE — 77030040361 HC SLV COMPR DVT MDII -B

## 2021-09-30 PROCEDURE — 77030036609 HC PRPH CRSNG TRIFORCE SET COOK -E

## 2021-09-30 PROCEDURE — 36005 INJECTION EXT VENOGRAPHY: CPT

## 2021-09-30 PROCEDURE — 74011000636 HC RX REV CODE- 636: Performed by: RADIOLOGY

## 2021-09-30 PROCEDURE — 77030021532 HC CATH ANGI DX IMPRS MRTM -B

## 2021-09-30 PROCEDURE — U0004 COV-19 TEST NON-CDC HGH THRU: HCPCS

## 2021-09-30 RX ORDER — ACETAMINOPHEN 500 MG
1000 TABLET ORAL
Status: DISCONTINUED | OUTPATIENT
Start: 2021-09-30 | End: 2021-10-04 | Stop reason: HOSPADM

## 2021-09-30 RX ORDER — SODIUM CHLORIDE, SODIUM LACTATE, POTASSIUM CHLORIDE, CALCIUM CHLORIDE 600; 310; 30; 20 MG/100ML; MG/100ML; MG/100ML; MG/100ML
150 INJECTION, SOLUTION INTRAVENOUS CONTINUOUS
Status: DISCONTINUED | OUTPATIENT
Start: 2021-09-30 | End: 2021-10-04 | Stop reason: HOSPADM

## 2021-09-30 RX ORDER — HYDROCODONE BITARTRATE AND ACETAMINOPHEN 5; 325 MG/1; MG/1
1 TABLET ORAL AS NEEDED
Status: DISCONTINUED | OUTPATIENT
Start: 2021-09-30 | End: 2021-10-04 | Stop reason: HOSPADM

## 2021-09-30 RX ORDER — CEFAZOLIN SODIUM/WATER 2 G/20 ML
2 SYRINGE (ML) INTRAVENOUS ONCE
Status: COMPLETED | OUTPATIENT
Start: 2021-09-30 | End: 2021-09-30

## 2021-09-30 RX ORDER — HYDROCODONE BITARTRATE AND ACETAMINOPHEN 10; 325 MG/1; MG/1
1 TABLET ORAL
COMMUNITY

## 2021-09-30 RX ORDER — ROCURONIUM BROMIDE 10 MG/ML
INJECTION, SOLUTION INTRAVENOUS AS NEEDED
Status: DISCONTINUED | OUTPATIENT
Start: 2021-09-30 | End: 2021-09-30 | Stop reason: HOSPADM

## 2021-09-30 RX ORDER — HEPARIN SODIUM 200 [USP'U]/100ML
1000 INJECTION, SOLUTION INTRAVENOUS AS NEEDED
Status: DISCONTINUED | OUTPATIENT
Start: 2021-09-30 | End: 2021-10-04 | Stop reason: HOSPADM

## 2021-09-30 RX ORDER — LIDOCAINE HYDROCHLORIDE 20 MG/ML
INJECTION, SOLUTION EPIDURAL; INFILTRATION; INTRACAUDAL; PERINEURAL AS NEEDED
Status: DISCONTINUED | OUTPATIENT
Start: 2021-09-30 | End: 2021-09-30 | Stop reason: HOSPADM

## 2021-09-30 RX ORDER — LIDOCAINE HYDROCHLORIDE 20 MG/ML
2-20 INJECTION, SOLUTION INFILTRATION; PERINEURAL ONCE
Status: COMPLETED | OUTPATIENT
Start: 2021-09-30 | End: 2021-09-30

## 2021-09-30 RX ORDER — SODIUM CHLORIDE, SODIUM LACTATE, POTASSIUM CHLORIDE, CALCIUM CHLORIDE 600; 310; 30; 20 MG/100ML; MG/100ML; MG/100ML; MG/100ML
INJECTION, SOLUTION INTRAVENOUS
Status: DISCONTINUED | OUTPATIENT
Start: 2021-09-30 | End: 2021-09-30 | Stop reason: HOSPADM

## 2021-09-30 RX ORDER — PROPOFOL 10 MG/ML
INJECTION, EMULSION INTRAVENOUS AS NEEDED
Status: DISCONTINUED | OUTPATIENT
Start: 2021-09-30 | End: 2021-09-30 | Stop reason: HOSPADM

## 2021-09-30 RX ORDER — NEOSTIGMINE METHYLSULFATE 1 MG/ML
INJECTION, SOLUTION INTRAVENOUS AS NEEDED
Status: DISCONTINUED | OUTPATIENT
Start: 2021-09-30 | End: 2021-09-30 | Stop reason: HOSPADM

## 2021-09-30 RX ORDER — DEXAMETHASONE SODIUM PHOSPHATE 4 MG/ML
INJECTION, SOLUTION INTRA-ARTICULAR; INTRALESIONAL; INTRAMUSCULAR; INTRAVENOUS; SOFT TISSUE AS NEEDED
Status: DISCONTINUED | OUTPATIENT
Start: 2021-09-30 | End: 2021-09-30 | Stop reason: HOSPADM

## 2021-09-30 RX ORDER — HEPARIN SODIUM 1000 [USP'U]/ML
INJECTION, SOLUTION INTRAVENOUS; SUBCUTANEOUS AS NEEDED
Status: DISCONTINUED | OUTPATIENT
Start: 2021-09-30 | End: 2021-09-30 | Stop reason: HOSPADM

## 2021-09-30 RX ORDER — SODIUM CHLORIDE 9 MG/ML
50 INJECTION, SOLUTION INTRAVENOUS CONTINUOUS
Status: DISCONTINUED | OUTPATIENT
Start: 2021-09-30 | End: 2021-10-04 | Stop reason: HOSPADM

## 2021-09-30 RX ORDER — GLYCOPYRROLATE 0.2 MG/ML
INJECTION INTRAMUSCULAR; INTRAVENOUS AS NEEDED
Status: DISCONTINUED | OUTPATIENT
Start: 2021-09-30 | End: 2021-09-30 | Stop reason: HOSPADM

## 2021-09-30 RX ORDER — FENTANYL CITRATE 50 UG/ML
INJECTION, SOLUTION INTRAMUSCULAR; INTRAVENOUS AS NEEDED
Status: DISCONTINUED | OUTPATIENT
Start: 2021-09-30 | End: 2021-09-30 | Stop reason: HOSPADM

## 2021-09-30 RX ORDER — ONDANSETRON 2 MG/ML
INJECTION INTRAMUSCULAR; INTRAVENOUS AS NEEDED
Status: DISCONTINUED | OUTPATIENT
Start: 2021-09-30 | End: 2021-09-30 | Stop reason: HOSPADM

## 2021-09-30 RX ORDER — HYDROCODONE BITARTRATE AND ACETAMINOPHEN 7.5; 325 MG/1; MG/1
1 TABLET ORAL
Qty: 18 TABLET | Refills: 0 | Status: SHIPPED | OUTPATIENT
Start: 2021-09-30 | End: 2021-10-03

## 2021-09-30 RX ORDER — HYDROMORPHONE HYDROCHLORIDE 2 MG/ML
0.5 INJECTION, SOLUTION INTRAMUSCULAR; INTRAVENOUS; SUBCUTANEOUS
Status: DISCONTINUED | OUTPATIENT
Start: 2021-09-30 | End: 2021-10-04 | Stop reason: HOSPADM

## 2021-09-30 RX ADMIN — PROPOFOL 200 MG: 10 INJECTION, EMULSION INTRAVENOUS at 08:59

## 2021-09-30 RX ADMIN — ROCURONIUM BROMIDE 50 MG: 10 INJECTION, SOLUTION INTRAVENOUS at 08:59

## 2021-09-30 RX ADMIN — ROCURONIUM BROMIDE 10 MG: 10 INJECTION, SOLUTION INTRAVENOUS at 09:45

## 2021-09-30 RX ADMIN — HEPARIN SODIUM 3000 UNITS: 1000 INJECTION, SOLUTION INTRAVENOUS; SUBCUTANEOUS at 11:13

## 2021-09-30 RX ADMIN — LIDOCAINE HYDROCHLORIDE 150 MG: 20 INJECTION, SOLUTION INFILTRATION; PERINEURAL at 09:22

## 2021-09-30 RX ADMIN — FENTANYL CITRATE 100 MCG: 50 INJECTION INTRAMUSCULAR; INTRAVENOUS at 08:59

## 2021-09-30 RX ADMIN — HEPARIN SODIUM 5000 UNITS: 1000 INJECTION, SOLUTION INTRAVENOUS; SUBCUTANEOUS at 10:05

## 2021-09-30 RX ADMIN — PROMETHAZINE HYDROCHLORIDE 3.25 MG: 25 INJECTION INTRAMUSCULAR; INTRAVENOUS at 12:18

## 2021-09-30 RX ADMIN — IOPAMIDOL 140 ML: 612 INJECTION, SOLUTION INTRAVENOUS at 11:25

## 2021-09-30 RX ADMIN — ONDANSETRON 4 MG: 2 INJECTION INTRAMUSCULAR; INTRAVENOUS at 09:59

## 2021-09-30 RX ADMIN — LIDOCAINE HYDROCHLORIDE 100 MG: 20 INJECTION, SOLUTION EPIDURAL; INFILTRATION; INTRACAUDAL; PERINEURAL at 08:59

## 2021-09-30 RX ADMIN — Medication 4 MG: at 11:32

## 2021-09-30 RX ADMIN — CEFAZOLIN 2 G: 1 INJECTION, POWDER, FOR SOLUTION INTRAVENOUS at 09:15

## 2021-09-30 RX ADMIN — SODIUM CHLORIDE, SODIUM LACTATE, POTASSIUM CHLORIDE, AND CALCIUM CHLORIDE: 600; 310; 30; 20 INJECTION, SOLUTION INTRAVENOUS at 08:46

## 2021-09-30 RX ADMIN — DEXAMETHASONE SODIUM PHOSPHATE 4 MG: 4 INJECTION, SOLUTION INTRAMUSCULAR; INTRAVENOUS at 09:59

## 2021-09-30 RX ADMIN — ROCURONIUM BROMIDE 5 MG: 10 INJECTION, SOLUTION INTRAVENOUS at 10:22

## 2021-09-30 RX ADMIN — HEPARIN SODIUM 2000 UNITS: 200 INJECTION, SOLUTION INTRAVENOUS at 09:32

## 2021-09-30 RX ADMIN — GLYCOPYRROLATE 0.6 MG: 0.2 INJECTION INTRAMUSCULAR; INTRAVENOUS at 11:32

## 2021-09-30 RX ADMIN — HYDROMORPHONE HYDROCHLORIDE 0.5 MG: 2 INJECTION INTRAMUSCULAR; INTRAVENOUS; SUBCUTANEOUS at 12:02

## 2021-09-30 NOTE — PROGRESS NOTES
TRANSFER - OUT REPORT:    Verbal report given to Mars Jo on Audrey Ruiz  being transferred to PACU for routine post - op       Report consisted of patients Situation, Background, Assessment and   Recommendations(SBAR). Information from the following report(s) SBAR, Kardex, Procedure Summary and MAR was reviewed with the receiving nurse. Lines:   Saline Lock 09/30/21 Left;Posterior Hand (Active)        Opportunity for questions and clarification was provided.

## 2021-09-30 NOTE — PROGRESS NOTES
Patient arrived to IR procedure room 1. Anesthesia personnel are present; please refer to anesthesia flow sheet.

## 2021-09-30 NOTE — PROCEDURES
Department of Interventional Radiology  [phone]        Interventional Radiology Brief Procedure Note    Patient: Bindu Rangel MRN: [id number]  SSN: [id number]    YOB: 1970  Age: 46 y.o. Sex: female      Date of Procedure: 9/30/2021    Pre-Procedure Diagnosis: Chronic L PTV and PopV occlusion w swelling. Post-Procedure Diagnosis: SAME    Procedure(s): LLE Venogram w venous recanalization. Brief Description of Procedure: PTV access. Performed By: Yaya Cedillo MD     Assistants: None    Anesthesia:General    Estimated Blood Loss: Less than 10ml    Specimens:  None    Implants:  None    Findings: Chronically occluded PTV/PopV successfully recanalized    Complications: None    Recommendations: 1 hour bedrest.  Elevate. Compression hose. Continue [address] [place] [address]. Follow Up: Office in 4-6 weeks.      Signed By: Yaya Cedillo MD     September 30, 2021

## 2021-09-30 NOTE — PERIOP NOTES
TRANSFER - OUT REPORT:    Verbal report given to Libia Pringle RN on Audrey Ruiz being transferred to IR room #5 for routine post - op. Report consisted of patients Situation, Background, Assessment and Recommendations (SBAR). Information from the following report(s) SBAR, OR Summary, Procedure Summary, Intake/Output, MAR and Recent Results was reviewed with the receiving nurse. Lines:   Saline Lock 09/30/21 Posterior;Right Hand (Active)   Site Assessment Clean, dry, & intact 09/30/21 1220   Phlebitis Assessment 0 09/30/21 1220   Infiltration Assessment 0 09/30/21 1220   Dressing Status Clean, dry, & intact; Occlusive 09/30/21 1220   Dressing Type Tape;Transparent 09/30/21 1220   Hub Color/Line Status Blue; Infusing;Patent 09/30/21 1220        Opportunity for questions and clarification was provided. Patient transported with:   O2 @ 0 liters    VTE prophylaxis orders have been written for Audrey Ruiz.    Patient and family given floor number and nurses name. Family updated re: pt status after security code verified.

## 2021-09-30 NOTE — H&P
Department of Interventional Radiology  ([phone])    History and Physical    Patient:  Geno Larkin MRN:  [id number]  SSN:  [id number]    YOB: 1970  Age:  46 y.o. Sex:  female      Primary Care Provider:  Marce Rodas MD  Referring Physician:  Dino Kilgore MD    Subjective:     Chief Complaint: Chronic LLE swelling that worsens throughout the day. History of the Present Illness: The patient is a 46 y.o. female who presents with a Chronic LLE swelling and Recurrent LLE DVT as well as PE in Spring, 2021 (in spite of IVC Filter). IVC Filter removed and May-Thurner Syndrome treated w stent placement in July, 2021. IVCF was originally placed in 2013. Recent US confirms chronic DVT w stenosis of the L Peroneal and Popliteal Veins. Left Calf Varicose veins become more prominent w standing. NPO. Taking Eliquis uninterrupted.       Past Medical History:   Diagnosis Date    Anemia     no supplements     Anxiety     Managed with meds     Asthma     none since stopped smoking-- 6 months ago, Inhalers prn     Benzodiazepine (tranquilizer) overdose 20 yrs ago    BMI 36.0-36.9,adult     Chronic pain     back, knees    Depression     medication    GERD (gastroesophageal reflux disease)     controlled with medication    Hypertension     controlled with med    Obese     bmi =35    IAN (obstructive sleep apnea)     DOES NOT WEAR CPAP    Panic attacks     S/P gastric bypass 2013    wt loss 60lbs-- gained all back    Schizophrenia ([address]     Seizures ([address]     one seizure several yrs ago- undetermined cause     Suicide, attempted 21 YRS AGO    Thromboembolus ([address] 2010    left leg-- IVF PLACED    Thyroid disease     HYPOTHYROIDISM     Past Surgical History:   Procedure Laterality Date    COLONOSCOPY N/A 3/7/2018    COLONOSCOPY  BMI 39 performed by Tristin Orellana MD at [facility] ENDOSCOPY    HX BREAST REDUCTION      HX GASTRECTOMY  3/4/13    Sleeve    HX HYSTERECTOMY  HX OOPHORECTOMY      HX TUBAL LIGATION      HX VASCULAR ACCESS  2010    IVF PLACED    IR REMOVE IVC FILTER W SI  7/8/2021    IR REMOVE THER TXCATH INF THROMB CESSATION  3/17/2021    IR THROMBOLYSIS TRANSCATH NON CORONARY OR INTRACRANIAL  3/16/2021        Review of Systems:    Pertinent items are noted in the History of Present Illness. Current Outpatient Medications   Medication Sig    HYDROcodone-acetaminophen (NORCO)  mg tablet Take 1 Tablet by mouth.  hydroCHLOROthiazide (HYDRODIURIL) 25 mg tablet Take 1 Tab by mouth daily.  apixaban (ELIQUIS) 5 mg tablet Take 2 twice daily for 5-1/2 days and then 1 twice daily. Indications: blood clot in a deep vein of the extremities, a clot in the lung    omeprazole (PRILOSEC) 20 mg capsule Take 40 mg by mouth two (2) times a day.  ALPRAZolam (XANAX) 2 mg tablet Take 2 mg by mouth three (3) times daily as needed for Anxiety.  dilTIAZem (TIAZAC) 120 mg SR capsule Take 120 mg by mouth daily. Take / use AM day of surgery  per anesthesia protocols.  amitriptyline (ELAVIL) 150 mg tablet Take 150 mg by mouth nightly.  zolpidem (AMBIEN) 10 mg tablet Take 10 mg by mouth nightly.  potassium chloride (KLOR-CON) 10 mEq tablet Take 1 Tab by mouth daily.  (Patient not taking: Reported on 9/30/2021)    lidocaine 4 % patch Apply one patch to low back for 12 hours daily then remove (Patient not taking: Reported on 9/30/2021)     Current Facility-Administered Medications   Medication Dose Route Frequency    heparin (PF) 2 units/ml in NS infusion 2,000 Units  1,000 mL Irrigation PRN    lidocaine (XYLOCAINE) 20 mg/mL (2 %) injection  mg  2-20 mL SubCUTAneous ONCE    ceFAZolin (ANCEF) 2 g/20 mL in sterile water IV syringe  2 g IntraVENous ONCE        Allergies   Allergen Reactions    Ace Inhibitors Anaphylaxis    Lisinopril Swelling     Lip/Mouth swelling       Family History   Problem Relation Age of Onset    Cancer Father     Breast Cancer Neg Hx      Social History     Tobacco Use    Smoking status: Former Smoker     Packs/day: 0.25     Years: 12.00     Pack years: 3.00     Quit date: 6/3/2018     Years since quitting: 3.3    Smokeless tobacco: Never Used    Tobacco comment: current social smoker (noted 12/04/18)   Substance Use Topics    Alcohol use: Yes     Alcohol/week: 0.0 standard drinks     Comment: occ        Objective:       Physical Examination:    Vitals:    09/22/21 1218 09/30/21 0703   BP:  (!) 154/97   Pulse:  98   Resp:  16   Temp:  98.1 °F (36.7 °C)   SpO2:  97%   Weight: 108.9 kg (240 lb) 108.9 kg (240 lb)   Height: 5' 9\" (1.753 m) 5' 9\" (1.753 m)     Blood pressure (!) 154/97, pulse 98, temperature 98.1 °F (36.7 °C), resp. rate 16, height 5' 9\" (1.753 m), weight 108.9 kg (240 lb), SpO2 97 %, not currently breastfeeding. General:  alert, cooperative, no distress  Heart:  regular rate and rhythm, S1, S2 normal, no murmur, click, rub or gallop  Lungs:  clear to auscultation bilaterally  Abdomen:  soft, protuberant, nondistended, normal bowel sounds  Neuro:  normal without focal findings  Extremities:  Mild L ankle/calf edema compared to the R. Reticular veins L calf. By report, all of these findings are less prominent at the moment as she has been supine this morning.       Laboratory:     Lab Results   Component Value Date/Time    Sodium 143 09/27/2021 03:43 PM    Sodium 142 07/02/2021 12:28 PM    Potassium 3.6 09/27/2021 03:43 PM    Potassium 3.5 07/02/2021 12:28 PM    Chloride 110 (H) 09/27/2021 03:43 PM    Chloride 111 (H) 07/02/2021 12:28 PM    CO2 27 09/27/2021 03:43 PM    CO2 28 07/02/2021 12:28 PM    Anion gap 6 (L) 09/27/2021 03:43 PM    Anion gap 3 (L) 07/02/2021 12:28 PM    Glucose 84 09/27/2021 03:43 PM    Glucose 88 07/02/2021 12:28 PM    BUN 18 09/27/2021 03:43 PM    BUN 14 07/02/2021 12:28 PM    Creatinine 1.16 (H) 09/27/2021 03:43 PM    Creatinine 0.75 07/02/2021 12:28 PM    GFR est AA >60 09/27/2021 03:43 PM    GFR est AA >60 07/02/2021 12:28 PM    GFR est non-AA 52 (L) 09/27/2021 03:43 PM    GFR est non-AA >60 07/02/2021 12:28 PM    Calcium 8.9 09/27/2021 03:43 PM    Calcium 8.4 07/02/2021 12:28 PM    Magnesium 1.6 (L) 10/07/2008 05:45 AM    Magnesium 1.8 10/06/2008 05:34 AM    Phosphorus 3.4 10/07/2008 05:45 AM    Phosphorus 3.1 10/06/2008 05:34 AM    Albumin 2.7 (L) 03/15/2021 05:08 PM    Albumin 3.4 (L) 10/04/2008 12:45 AM    Protein, total 8.8 (H) 03/15/2021 05:08 PM    Protein, total 7.8 10/04/2008 12:45 AM    Globulin 6.1 (H) 03/15/2021 05:08 PM    Globulin 4.4 (H) 10/04/2008 12:45 AM    A-G Ratio 0.4 (L) 03/15/2021 05:08 PM    A-G Ratio 0.8 (L) 10/04/2008 12:45 AM    ALT (SGPT) 33 03/15/2021 05:08 PM    ALT (SGPT) 54 10/04/2008 12:45 AM     Lab Results   Component Value Date/Time    WBC 5.3 09/27/2021 03:43 PM    WBC 5.7 07/02/2021 12:28 PM    HGB 10.4 (L) 09/27/2021 03:43 PM    HGB 10.5 (L) 07/02/2021 12:28 PM    HCT 34.3 (L) 09/27/2021 03:43 PM    HCT 33.1 (L) 07/02/2021 12:28 PM    PLATELET 628 22/06/3732 03:43 PM    PLATELET 237 04/31/0240 12:28 PM     Lab Results   Component Value Date/Time    aPTT 26.6 09/27/2021 03:43 PM    aPTT 36.3 (H) 03/17/2021 10:21 AM    Prothrombin time 13.2 09/27/2021 03:43 PM    Prothrombin time 14.4 07/02/2021 12:28 PM    INR 1.0 09/27/2021 03:43 PM    INR 1.1 07/02/2021 12:28 PM       Assessment:     L Peroneal and Popliteal Vein Chronic DVT w narrowing. Recurrent DVT, now on Eliquis. Plan:     Planned Procedure:  LLE Venogram w intervention. GETA. Rx for Compression Hose given (again). Stressed the importance of [address] use every day. eRx for Norco.     Risks, benefits, and alternatives reviewed with patient and she agrees to proceed with the procedure.       Signed By: Donna Cruz MD     September 30, 2021

## 2021-09-30 NOTE — ANESTHESIA POSTPROCEDURE EVALUATION
* No procedures listed *.    general    Anesthesia Post Evaluation      Multimodal analgesia: multimodal analgesia used between 6 hours prior to anesthesia start to PACU discharge  Patient location during evaluation: bedside  Patient participation: complete - patient participated  Level of consciousness: awake and awake and alert  Pain management: adequate  Airway patency: patent  Anesthetic complications: no  Cardiovascular status: acceptable and stable  Respiratory status: acceptable and room air  Hydration status: acceptable  Post anesthesia nausea and vomiting:  none  Final Post Anesthesia Temperature Assessment:  Normothermia (36.0-37.5 degrees C)      INITIAL Post-op Vital signs:   Vitals Value Taken Time   /90 09/30/21 1223   Temp 36.8 °C (98.3 °F) 09/30/21 1155   Pulse 82 09/30/21 1225   Resp 16 09/30/21 1215   SpO2 98 % 09/30/21 1225   Vitals shown include unvalidated device data.

## 2021-09-30 NOTE — DISCHARGE INSTRUCTIONS
[address]  Department of Interventional Radiology  [facility]  [phone] Office  [phone] Fax       Venogram Discharge Instructions    General Information:   A venogram is a procedure that allows the interventional radiologist to take pictures of the blood flow in the vascular system. A radiology contrast (or dye) is injected into the veins so that the condition of the veins and valves may be visualized. This procedure can be used to diagnose narrowing of the veins or the presence of a blood clot in the deep veins of the body. During this process, a stent, filter or a special intravenous line could be placed. Home Care Instructions: You can resume your regular diet. Do not shower, bathe, swim, drink alcohol, or make any important legal decisions in the next 48 hours. Do not lift anything heavier than a gallon of milk for 5 days. If you take Glucophage (metformin) for diabetes, do not take it for the next 48 hours. If you were asked to hold any blood thinners prior to the procedure, you may restart that medicine the day after the procedure is completed. Recline your car seat for the ride home. Call If:   You should call your Physician and/or the Radiology Nurse if you have any signs of infection; fever, drainage, redness, and/or swelling. If the puncture site should ooze, please call. Also call if you have any pain, decreased sensation, numbness, tingling, swelling, or change in color to the affected extremity. SEEK IMMEDIATE MEDICAL CARE IF YOUR PUNCTURE SITE STARTS TO BLEED. APPLY ENOUGH FIRM PRESSURE TO THE SITE WITH THE TIPS OF YOUR FINGERS TO STOP THE BLEEDING. Follow-Up Instructions:  Please see your ordering doctor as he/she has requested. To Reach Us: If you have any questions about your procedure, please call the Interventional Radiology department at [phone].  After business hours (5pm) and weekends, call the answering service at [phone] and ask for the Radiologist on call to be paged. Si tiene Preguntas acerca del procedimiento, por favor llame al departamento de Radiología Intervencional al [phone]. Después de horas de oficina (5 pm) y los fines de [place], llamar al [facility] al [phone] y pregunte por el [facility]. Interventional Radiology General Nurse Discharge    After general anesthesia or intravenous sedation, for 24 hours or while taking prescription Narcotics:  · Limit your activities  · Do not drive and operate hazardous machinery  · Do not make important personal or business decisions  · Do  not drink alcoholic beverages  · If you have not urinated within 8 hours after discharge, please contact your surgeon on call. * Please give a list of your current medications to your Primary Care Provider. * Please update this list whenever your medications are discontinued, doses are     changed, or new medications (including over-the-counter products) are added. * Please carry medication information at all times in case of emergency situations. These are general instructions for a healthy lifestyle:    No smoking/ No tobacco products/ Avoid exposure to second hand smoke  Surgeon General's Warning:  Quitting smoking now greatly reduces serious risk to your health. Obesity, smoking, and sedentary lifestyle greatly increases your risk for illness  A healthy diet, regular physical exercise & weight monitoring are important for maintaining a healthy lifestyle    You may be retaining fluid if you have a history of heart failure or if you experience any of the following symptoms:  Weight gain of 3 pounds or more overnight or 5 pounds in a week, increased swelling in our hands or feet or shortness of breath while lying flat in bed. Please call your doctor as soon as you notice any of these symptoms; do not wait until your next office visit.     Recognize signs and symptoms of STROKE:  F-face looks uneven    A-arms unable to move or move unevenly    S-speech slurred or non-existent    T-time-call 911 as soon as signs and symptoms begin-DO NOT go       Back to bed or wait to see if you get better-TIME IS BRAIN.     Patient Signature:  Date: 9/30/2021  Discharging Nurse: Alvaro Stinson RN

## 2021-09-30 NOTE — PERIOP NOTES
Katherine Graf MD at bedside to evaluate patient. Gave approval for her to D/C from PACU and return to IR at this time.

## 2021-09-30 NOTE — ANESTHESIA PREPROCEDURE EVALUATION
Anesthetic History   No history of anesthetic complications            Review of Systems / Medical History  Patient summary reviewed and pertinent labs reviewed    Pulmonary        Sleep apnea: No treatment  Smoker  Asthma : well controlled       Neuro/Psych     seizures (last on 7 years ago)    Psychiatric history     Cardiovascular    Hypertension: well controlled              Exercise tolerance: >4 METS  Comments: EF normal   GI/Hepatic/Renal     GERD           Endo/Other      Hypothyroidism: well controlled  Morbid obesity     Other Findings   Comments: Hx gastric sleeve  Schizophrenia/bipolar  Hx DVT/PE--on Eliquis (none today)         Physical Exam    Airway  Mallampati: II  TM Distance: 4 - 6 cm  Neck ROM: normal range of motion   Mouth opening: Normal     Cardiovascular    Rhythm: regular  Rate: normal         Dental  No notable dental hx       Pulmonary  Breath sounds clear to auscultation               Abdominal  GI exam deferred       Other Findings            Anesthetic Plan    ASA: 3  Anesthesia type: general          Induction: Intravenous  Anesthetic plan and risks discussed with: Patient

## 2021-11-09 ENCOUNTER — HOSPITAL ENCOUNTER (OUTPATIENT)
Dept: MAMMOGRAPHY | Age: 51
Discharge: HOME | End: 2021-11-09
Attending: INTERNAL MEDICINE
Payer: MEDICARE

## 2021-11-09 DIAGNOSIS — Z12.31 ENCOUNTER FOR SCREENING MAMMOGRAM FOR MALIGNANT NEOPLASM OF BREAST: ICD-10-CM

## 2021-11-09 PROCEDURE — 77067 SCR MAMMO BI INCL CAD: CPT

## 2021-11-12 ENCOUNTER — TRANSCRIBE ORDER (OUTPATIENT)
Dept: SCHEDULING | Age: 51
End: 2021-11-12

## 2021-11-12 DIAGNOSIS — I82.409 DVT, LOWER EXTREMITY (HCC): ICD-10-CM

## 2021-11-12 DIAGNOSIS — M79.89 SWELLING OF LIMB: ICD-10-CM

## 2021-11-12 DIAGNOSIS — M79.609 PAIN IN LIMB: Primary | ICD-10-CM

## 2022-03-18 PROBLEM — E87.1 HYPONATREMIA: Status: ACTIVE | Noted: 2021-03-15

## 2022-03-18 PROBLEM — D72.829 LEUKOCYTOSIS: Status: ACTIVE | Noted: 2021-03-15

## 2022-03-19 PROBLEM — D64.9 NORMOCYTIC ANEMIA: Status: ACTIVE | Noted: 2021-03-15

## 2022-03-19 PROBLEM — I82.409 DVT (DEEP VENOUS THROMBOSIS) (HCC): Status: ACTIVE | Noted: 2021-03-17

## 2022-03-19 PROBLEM — I26.99 BILATERAL PULMONARY EMBOLISM (HCC): Status: ACTIVE | Noted: 2021-03-15

## 2022-08-01 ENCOUNTER — HOSPITAL ENCOUNTER (OUTPATIENT)
Age: 52
Setting detail: OBSERVATION
Discharge: HOME | End: 2022-08-02
Attending: FAMILY MEDICINE
Payer: COMMERCIAL

## 2022-08-01 ENCOUNTER — HOSPITAL ENCOUNTER (EMERGENCY)
Dept: CT IMAGING | Age: 52
Discharge: HOME | End: 2022-08-04

## 2022-08-01 ENCOUNTER — HOSPITAL ENCOUNTER (EMERGENCY)
Age: 52
Discharge: ANOTHER HOSPITAL | End: 2022-08-01
Attending: EMERGENCY MEDICINE

## 2022-08-01 ENCOUNTER — HOSPITAL ENCOUNTER (EMERGENCY)
Dept: GENERAL RADIOLOGY | Age: 52
Discharge: HOME | End: 2022-08-04

## 2022-08-01 VITALS
RESPIRATION RATE: 20 BRPM | SYSTOLIC BLOOD PRESSURE: 150 MMHG | TEMPERATURE: 98.6 F | OXYGEN SATURATION: 99 % | BODY MASS INDEX: 35.55 KG/M2 | DIASTOLIC BLOOD PRESSURE: 94 MMHG | HEIGHT: 69 IN | HEART RATE: 89 BPM | WEIGHT: 240 LBS

## 2022-08-01 DIAGNOSIS — R07.89 OTHER CHEST PAIN: Primary | ICD-10-CM

## 2022-08-01 DIAGNOSIS — R07.9 CHEST PAIN, UNSPECIFIED TYPE: Primary | ICD-10-CM

## 2022-08-01 DIAGNOSIS — I21.4 NSTEMI (NON-ST ELEVATED MYOCARDIAL INFARCTION) (HCC): ICD-10-CM

## 2022-08-01 PROBLEM — Z86.718 HISTORY OF VENOUS THROMBOEMBOLISM: Status: ACTIVE | Noted: 2022-08-01

## 2022-08-01 PROBLEM — D64.9 NORMOCYTIC ANEMIA: Status: ACTIVE | Noted: 2021-03-15

## 2022-08-01 LAB
ALBUMIN SERPL-MCNC: 3.6 G/DL (ref 3.5–5)
ALBUMIN/GLOB SERPL: 0.9 {RATIO} (ref 1.2–3.5)
ALP SERPL-CCNC: 132 U/L (ref 50–136)
ALT SERPL-CCNC: 25 U/L (ref 12–65)
ANION GAP SERPL CALC-SCNC: 5 MMOL/L (ref 7–16)
AST SERPL-CCNC: 19 U/L (ref 15–37)
BILIRUB SERPL-MCNC: 0.4 MG/DL (ref 0.2–1.1)
BUN SERPL-MCNC: 23 MG/DL (ref 6–23)
CALCIUM SERPL-MCNC: 9.3 MG/DL (ref 8.3–10.4)
CHLORIDE SERPL-SCNC: 109 MMOL/L (ref 98–107)
CO2 SERPL-SCNC: 26 MMOL/L (ref 21–32)
CREAT SERPL-MCNC: 0.96 MG/DL (ref 0.6–1)
EKG ATRIAL RATE: 107 BPM
EKG DIAGNOSIS: NORMAL
EKG P AXIS: 64 DEGREES
EKG P-R INTERVAL: 172 MS
EKG Q-T INTERVAL: 338 MS
EKG QRS DURATION: 92 MS
EKG QTC CALCULATION (BAZETT): 451 MS
EKG R AXIS: 19 DEGREES
EKG T AXIS: 54 DEGREES
EKG VENTRICULAR RATE: 107 BPM
ERYTHROCYTE [DISTWIDTH] IN BLOOD BY AUTOMATED COUNT: 18.1 % (ref 11.9–14.6)
GLOBULIN SER CALC-MCNC: 4.1 G/DL (ref 2.3–3.5)
GLUCOSE SERPL-MCNC: 96 MG/DL (ref 65–100)
HCT VFR BLD AUTO: 34.7 % (ref 35.8–46.3)
HGB BLD-MCNC: 11.1 G/DL (ref 11.7–15.4)
MCH RBC QN AUTO: 26.4 PG (ref 26.1–32.9)
MCHC RBC AUTO-ENTMCNC: 32 G/DL (ref 31.4–35)
MCV RBC AUTO: 82.4 FL (ref 79.6–97.8)
NRBC # BLD: 0 K/UL (ref 0–0.2)
PLATELET # BLD AUTO: 346 K/UL (ref 150–450)
PMV BLD AUTO: 9.9 FL (ref 9.4–12.3)
POTASSIUM SERPL-SCNC: 4.2 MMOL/L (ref 3.5–5.1)
PROT SERPL-MCNC: 7.7 G/DL (ref 6.3–8.2)
RBC # BLD AUTO: 4.21 M/UL (ref 4.05–5.2)
SODIUM SERPL-SCNC: 140 MMOL/L (ref 136–145)
TROPONIN I SERPL HS-MCNC: 13 PG/ML (ref 0–14)
TROPONIN I SERPL HS-MCNC: 22.9 PG/ML (ref 0–14)
WBC # BLD AUTO: 8.7 K/UL (ref 4.3–11.1)

## 2022-08-01 PROCEDURE — G0378 HOSPITAL OBSERVATION PER HR: HCPCS

## 2022-08-01 PROCEDURE — G0379 DIRECT REFER HOSPITAL OBSERV: HCPCS

## 2022-08-01 PROCEDURE — 85379 FIBRIN DEGRADATION QUANT: CPT

## 2022-08-01 PROCEDURE — 36415 COLL VENOUS BLD VENIPUNCTURE: CPT

## 2022-08-01 PROCEDURE — 80053 COMPREHEN METABOLIC PANEL: CPT

## 2022-08-01 PROCEDURE — 96360 HYDRATION IV INFUSION INIT: CPT

## 2022-08-01 PROCEDURE — 84484 ASSAY OF TROPONIN QUANT: CPT

## 2022-08-01 PROCEDURE — 2580000003 HC RX 258: Performed by: INTERNAL MEDICINE

## 2022-08-01 PROCEDURE — 71045 X-RAY EXAM CHEST 1 VIEW: CPT

## 2022-08-01 PROCEDURE — 85027 COMPLETE CBC AUTOMATED: CPT

## 2022-08-01 PROCEDURE — 6370000000 HC RX 637 (ALT 250 FOR IP): Performed by: EMERGENCY MEDICINE

## 2022-08-01 PROCEDURE — 93005 ELECTROCARDIOGRAM TRACING: CPT | Performed by: EMERGENCY MEDICINE

## 2022-08-01 PROCEDURE — 70450 CT HEAD/BRAIN W/O DYE: CPT

## 2022-08-01 PROCEDURE — 99285 EMERGENCY DEPT VISIT HI MDM: CPT

## 2022-08-01 PROCEDURE — 6370000000 HC RX 637 (ALT 250 FOR IP): Performed by: INTERNAL MEDICINE

## 2022-08-01 RX ORDER — SODIUM CHLORIDE 9 MG/ML
INJECTION, SOLUTION INTRAVENOUS PRN
Status: DISCONTINUED | OUTPATIENT
Start: 2022-08-01 | End: 2022-08-02 | Stop reason: HOSPADM

## 2022-08-01 RX ORDER — AMITRIPTYLINE HYDROCHLORIDE 50 MG/1
150 TABLET, FILM COATED ORAL NIGHTLY
Status: DISCONTINUED | OUTPATIENT
Start: 2022-08-01 | End: 2022-08-02 | Stop reason: HOSPADM

## 2022-08-01 RX ORDER — SODIUM CHLORIDE 0.9 % (FLUSH) 0.9 %
5-40 SYRINGE (ML) INJECTION PRN
Status: DISCONTINUED | OUTPATIENT
Start: 2022-08-01 | End: 2022-08-02 | Stop reason: HOSPADM

## 2022-08-01 RX ORDER — POLYETHYLENE GLYCOL 3350 17 G/17G
17 POWDER, FOR SOLUTION ORAL DAILY PRN
Status: DISCONTINUED | OUTPATIENT
Start: 2022-08-01 | End: 2022-08-02 | Stop reason: HOSPADM

## 2022-08-01 RX ORDER — ATORVASTATIN CALCIUM 40 MG/1
40 TABLET, FILM COATED ORAL NIGHTLY
Status: DISCONTINUED | OUTPATIENT
Start: 2022-08-01 | End: 2022-08-02 | Stop reason: HOSPADM

## 2022-08-01 RX ORDER — HYDROCODONE BITARTRATE AND ACETAMINOPHEN 10; 325 MG/1; MG/1
1 TABLET ORAL EVERY 6 HOURS PRN
Status: DISCONTINUED | OUTPATIENT
Start: 2022-08-01 | End: 2022-08-02 | Stop reason: HOSPADM

## 2022-08-01 RX ORDER — ONDANSETRON 4 MG/1
4 TABLET, ORALLY DISINTEGRATING ORAL EVERY 8 HOURS PRN
Status: DISCONTINUED | OUTPATIENT
Start: 2022-08-01 | End: 2022-08-02 | Stop reason: HOSPADM

## 2022-08-01 RX ORDER — POTASSIUM CHLORIDE 20 MEQ/1
40 TABLET, EXTENDED RELEASE ORAL PRN
Status: DISCONTINUED | OUTPATIENT
Start: 2022-08-01 | End: 2022-08-02 | Stop reason: HOSPADM

## 2022-08-01 RX ORDER — ALPRAZOLAM 0.5 MG/1
2 TABLET ORAL 3 TIMES DAILY PRN
Status: DISCONTINUED | OUTPATIENT
Start: 2022-08-01 | End: 2022-08-02 | Stop reason: HOSPADM

## 2022-08-01 RX ORDER — SODIUM CHLORIDE 9 MG/ML
INJECTION, SOLUTION INTRAVENOUS CONTINUOUS
Status: DISCONTINUED | OUTPATIENT
Start: 2022-08-01 | End: 2022-08-02 | Stop reason: HOSPADM

## 2022-08-01 RX ORDER — HYDRALAZINE HYDROCHLORIDE 20 MG/ML
10 INJECTION INTRAMUSCULAR; INTRAVENOUS
Status: DISCONTINUED | OUTPATIENT
Start: 2022-08-01 | End: 2022-08-01 | Stop reason: HOSPADM

## 2022-08-01 RX ORDER — ASPIRIN 325 MG
325 TABLET ORAL
Status: COMPLETED | OUTPATIENT
Start: 2022-08-01 | End: 2022-08-01

## 2022-08-01 RX ORDER — HYDROCHLOROTHIAZIDE 25 MG/1
25 TABLET ORAL DAILY
Status: DISCONTINUED | OUTPATIENT
Start: 2022-08-02 | End: 2022-08-02 | Stop reason: HOSPADM

## 2022-08-01 RX ORDER — ACETAMINOPHEN 325 MG/1
650 TABLET ORAL EVERY 6 HOURS PRN
Status: DISCONTINUED | OUTPATIENT
Start: 2022-08-01 | End: 2022-08-02 | Stop reason: HOSPADM

## 2022-08-01 RX ORDER — ASPIRIN 81 MG/1
81 TABLET, CHEWABLE ORAL DAILY
Status: DISCONTINUED | OUTPATIENT
Start: 2022-08-02 | End: 2022-08-02 | Stop reason: HOSPADM

## 2022-08-01 RX ORDER — ACETAMINOPHEN 650 MG/1
650 SUPPOSITORY RECTAL EVERY 6 HOURS PRN
Status: DISCONTINUED | OUTPATIENT
Start: 2022-08-01 | End: 2022-08-02 | Stop reason: HOSPADM

## 2022-08-01 RX ORDER — DILTIAZEM HYDROCHLORIDE 120 MG/1
120 CAPSULE, COATED, EXTENDED RELEASE ORAL DAILY
Status: DISCONTINUED | OUTPATIENT
Start: 2022-08-01 | End: 2022-08-02 | Stop reason: HOSPADM

## 2022-08-01 RX ORDER — SODIUM CHLORIDE 0.9 % (FLUSH) 0.9 %
5-40 SYRINGE (ML) INJECTION EVERY 12 HOURS SCHEDULED
Status: DISCONTINUED | OUTPATIENT
Start: 2022-08-01 | End: 2022-08-02 | Stop reason: HOSPADM

## 2022-08-01 RX ORDER — ZOLPIDEM TARTRATE 5 MG/1
5 TABLET ORAL NIGHTLY PRN
Status: DISCONTINUED | OUTPATIENT
Start: 2022-08-01 | End: 2022-08-02 | Stop reason: HOSPADM

## 2022-08-01 RX ORDER — PANTOPRAZOLE SODIUM 40 MG/1
40 TABLET, DELAYED RELEASE ORAL
Status: DISCONTINUED | OUTPATIENT
Start: 2022-08-02 | End: 2022-08-02 | Stop reason: HOSPADM

## 2022-08-01 RX ORDER — POTASSIUM CHLORIDE 7.45 MG/ML
10 INJECTION INTRAVENOUS PRN
Status: DISCONTINUED | OUTPATIENT
Start: 2022-08-01 | End: 2022-08-02 | Stop reason: HOSPADM

## 2022-08-01 RX ORDER — HYDRALAZINE HYDROCHLORIDE 20 MG/ML
20 INJECTION INTRAMUSCULAR; INTRAVENOUS EVERY 6 HOURS PRN
Status: DISCONTINUED | OUTPATIENT
Start: 2022-08-01 | End: 2022-08-02 | Stop reason: HOSPADM

## 2022-08-01 RX ORDER — MAGNESIUM SULFATE IN WATER 40 MG/ML
2000 INJECTION, SOLUTION INTRAVENOUS PRN
Status: DISCONTINUED | OUTPATIENT
Start: 2022-08-01 | End: 2022-08-02 | Stop reason: HOSPADM

## 2022-08-01 RX ORDER — ONDANSETRON 2 MG/ML
4 INJECTION INTRAMUSCULAR; INTRAVENOUS EVERY 6 HOURS PRN
Status: DISCONTINUED | OUTPATIENT
Start: 2022-08-01 | End: 2022-08-02 | Stop reason: HOSPADM

## 2022-08-01 RX ADMIN — DILTIAZEM HYDROCHLORIDE 120 MG: 120 CAPSULE, COATED, EXTENDED RELEASE ORAL at 21:16

## 2022-08-01 RX ADMIN — SODIUM CHLORIDE, PRESERVATIVE FREE 10 ML: 5 INJECTION INTRAVENOUS at 21:09

## 2022-08-01 RX ADMIN — ASPIRIN 325 MG: 325 TABLET, FILM COATED ORAL at 16:25

## 2022-08-01 RX ADMIN — AMITRIPTYLINE HYDROCHLORIDE 150 MG: 50 TABLET, FILM COATED ORAL at 21:08

## 2022-08-01 RX ADMIN — SODIUM CHLORIDE: 9 INJECTION, SOLUTION INTRAVENOUS at 21:26

## 2022-08-01 RX ADMIN — ATORVASTATIN CALCIUM 40 MG: 40 TABLET, FILM COATED ORAL at 21:08

## 2022-08-01 RX ADMIN — ZOLPIDEM TARTRATE 5 MG: 5 TABLET ORAL at 21:16

## 2022-08-01 RX ADMIN — APIXABAN 5 MG: 5 TABLET, FILM COATED ORAL at 21:08

## 2022-08-01 ASSESSMENT — PAIN SCALES - GENERAL
PAINLEVEL_OUTOF10: 4
PAINLEVEL_OUTOF10: 0
PAINLEVEL_OUTOF10: 8

## 2022-08-01 ASSESSMENT — PAIN DESCRIPTION - LOCATION: LOCATION: CHEST

## 2022-08-01 ASSESSMENT — PAIN - FUNCTIONAL ASSESSMENT: PAIN_FUNCTIONAL_ASSESSMENT: 0-10

## 2022-08-01 ASSESSMENT — PAIN DESCRIPTION - ORIENTATION: ORIENTATION: MID

## 2022-08-01 NOTE — FLOWSHEET NOTE
08/01/22 1515   Vital Signs   BP (!) 162/98     Dr Malika West notified of above BP. Orders to hold hydralazine. Will continue to monitor.

## 2022-08-01 NOTE — ED TRIAGE NOTES
Pt with sternal chest pain that began around 0830. Pt also with shortness of breath.  Pt states that the pain is similar to the pain when she had bilateral PE.

## 2022-08-01 NOTE — ED PROVIDER NOTES
[employer] Emergency Department Provider Note                   PCP:                Brandy Esparza MD               Age: 45 y.o. Sex: female     No diagnosis found. DISPOSITION         MDM  Number of Diagnoses or Management Options  Chest pain, unspecified type  NSTEMI (non-ST elevated myocardial infarction) [facility])  Diagnosis management comments: Dr. Georgie Hernandez wishes for the patient to be transferred downtown and admitted to the hospitalist and likely will take that the patient to the Cath Lab tomorrow. 15:56 Spoke with Dr. Michelle Glaser and she will admit. Amount and/or Complexity of Data Reviewed  Clinical lab tests: ordered and reviewed  Tests in the radiology section of CPT®: ordered and reviewed  Review and summarize past medical records: yes  Independent visualization of images, tracings, or specimens: yes    Risk of Complications, Morbidity, and/or Mortality  Presenting problems: moderate  Diagnostic procedures: moderate  Management options: moderate    Patient Progress  Patient progress: improved       Orders Placed This Encounter   Procedures    CT Head W/O Contrast    CBC    Comprehensive Metabolic Panel    Troponin    Cardiac Monitor    Pulse Oximetry    EKG 12 Lead    Saline lock IV        Janis Mcclelland is a 45 y.o. female who presents to the Emergency Department with chief complaint of    Chief Complaint   Patient presents with    Chest Pain    Shortness of Breath      Patient is a 41-year-old female with a history of DVT, PE, hypertension, schizophrenia, bipolar who presents with chest pain acute onset yesterday at 8 PM the last approximate 1 to 2 hours substernal nature pressure-like sensation nonradiating. Patient had no associated shortness of breath. Patient had another episode of chest pain at 8 AM this morning and lasted approximately 4 hours. Patient currently has no chest pain. Patient never has shortness of breath.   Patient does have a history of a PE in which she is currently on Eliquis. Patient also states she has a global headache in nature 2 out of 10. The history is provided by the patient. Chest Pain  Pain location:  Substernal area  Pain quality: pressure    Pain radiates to:  Does not radiate  Pain severity:  Moderate  Onset quality:  Sudden  Duration:  2 hours  Timing:  Intermittent  Progression:  Waxing and waning  Chronicity:  New  Context: not breathing and not drug use    Relieved by:  Nothing  Worsened by:  Nothing  Ineffective treatments:  None tried  Associated symptoms: no anorexia and no fever    Risk factors: no high cholesterol      All other systems reviewed and are negative. Review of Systems   Constitutional:  Negative for fever. Cardiovascular:  Positive for chest pain. Gastrointestinal:  Negative for anorexia. All other systems reviewed and are negative.     Past Medical History:   Diagnosis Date    Anemia     no supplements     Anxiety     Managed with meds     Asthma     none since stopped smoking-- 6 months ago, Inhalers prn     Benzodiazepine (tranquilizer) overdose 20 yrs ago    BMI 36.0-36.9,adult     Chronic pain     back, knees    Depression     medication    GERD (gastroesophageal reflux disease)     controlled with medication    Hypertension     controlled with med    Obese     bmi =35    SAMSON (obstructive sleep apnea)     DOES NOT WEAR CPAP    Panic attacks     S/P gastric bypass 2013    wt loss 60lbs-- gained all back    Schizophrenia ([address]     Seizures ([address]     one seizure several yrs ago- undetermined cause     Thromboembolus ([address] 2010    left leg-- IVF PLACED    Thyroid disease     HYPOTHYROIDISM        Past Surgical History:   Procedure Laterality Date    BREAST REDUCTION SURGERY      COLONOSCOPY N/A 3/7/2018    COLONOSCOPY  BMI 39 performed by Johnathan Toabr MD at [facility] ENDOSCOPY    GASTRECTOMY  3/4/13    Sleeve    HYSTERECTOMY (CERVIX STATUS UNKNOWN)      IR IVC FILTER RETRIEVAL  7/8/2021    IR IVC FILTER RETRIEVAL 2021    IR IVC FILTER RETRIEVAL 2021 [facility] SPE[facility]    IR THROMB ART/KARIN REMOV SUBSQ DAY  3/17/2021    IR TRANSCATH INFUSION THROMB NONCORONARY  3/16/2021    OVARY REMOVAL      TUBAL LIGATION      VASCULAR SURGERY      IVF PLACED        Family History   Problem Relation Age of Onset    Cancer Father     Breast Cancer Neg Hx         Social History     Socioeconomic History    Marital status: Single     Spouse name: None    Number of children: None    Years of education: None    Highest education level: None   Tobacco Use    Smoking status: Former     Packs/day: 0.25     Types: Cigarettes     Quit date: 6/3/2018     Years since quittin.1    Smokeless tobacco: Never    Tobacco comments:     Quit smoking: current social smoker (noted 18)   Substance and Sexual Activity    Alcohol use: Yes     Alcohol/week: 0.0 standard drinks    Drug use: No        Allergies: Ace inhibitors    Previous Medications    ALPRAZOLAM (XANAX) 2 MG TABLET    Take 2 mg by mouth 3 times daily as needed. AMITRIPTYLINE (ELAVIL) 150 MG TABLET    Take 150 mg by mouth    APIXABAN (ELIQUIS) 5 MG TABS TABLET    Take 2 twice daily for 5-1/2 days and then 1 twice daily. Indications: blood clot in a deep vein of the extremities, a clot in the lung    DILTIAZEM (TIAZAC) 120 MG EXTENDED RELEASE CAPSULE    Take 120 mg by mouth daily    HYDROCHLOROTHIAZIDE (HYDRODIURIL) 25 MG TABLET    Take 25 mg by mouth daily    HYDROCODONE-ACETAMINOPHEN (NORCO)  MG PER TABLET    Take 1 tablet by mouth. LIDOCAINE 4 % EXTERNAL PATCH    Apply one patch to low back for 12 hours daily then remove    OMEPRAZOLE (PRILOSEC) 20 MG DELAYED RELEASE CAPSULE    Take 40 mg by mouth 2 times daily    POTASSIUM CHLORIDE (KLOR-CON M) 10 MEQ EXTENDED RELEASE TABLET    Take 10 mEq by mouth daily    ZOLPIDEM (AMBIEN) 10 MG TABLET    Take 10 mg by mouth. Vitals signs and nursing note reviewed.    Patient Vitals for the past 4 hrs:   Temp Pulse Resp following components:    Chloride 109 (*)     Anion Gap 5 (*)     Globulin 4.1 (*)     Albumin/Globulin Ratio 0.9 (*)     All other components within normal limits   TROPONIN   TROPONIN        CT Head W/O Contrast    (Results Pending)                            Voice dictation software was used during the making of this note. This software is not perfect and grammatical and other typographical errors may be present. This note has not been completely proofread for errors.       Mihci Paredes MD  08/01/22 960 Edward Easley MD  08/01/22 7144

## 2022-08-01 NOTE — H&P
Hospitalist History and Physical   Admit Date:  2022  6:36 PM   Name:  Shawn Sawyer   Age:  45 y.o. Sex:  female  :  1970   MRN:  [id number]   Room:  [facility]    Presenting Complaint: No chief complaint on file. Reason(s) for Admission: Chest pain [R07.9]     History of Present Illness:   Shawn Sawyer is a 45 y.o. female with medical history of schizophrenia, Bipolar disease, HLD, HTN, SAMSON, and h/o DVT/bilateral PEs who presented to  ER on 22 with 24-48 hours of intermittent non-radiating substernal chest pain worsened with exertion and anxiety and improves with cold water. She states that she has had the intermittent chest pain for months, but has worsened over the past 2 days. She states that the night before she was up doing some stuff around her house and she started having the pain around 8pm. She sat down and drank some water and it went away. Then this morning when she got up the pain returned. Once again she had some water and it went away. Then in the afternoon it returned so she came to the ER worried that it may be another PE despite her taking her [address] as prescribed. Denies SOB/cough. Denies F/C. Izabela sweating. Denies N/V/D. Denies it being associated with food/drink other than water improving it afterwards. Denies LE edema. Review of Systems:  10 systems reviewed and negative except as noted in HPI.   Assessment & Plan:     Principal Problem:    Chest pain  - Angina vs esophageal vs anxiety/psych  - She is mildly tender on exam, no rash seen  - Given ASA in ER --> continue ASA daily  - Add statin  - Consult Cardiology for possible LHC tomorrow - if not, will need ECHO  - Continue home Pantoprazole PO BID  - Trend troponin level    Active Problems:    HTN (hypertension)  - Mildly elevated  - Continue home Cardizem  - Continue HCTZ  - Hydralazine PRN      Bipolar 1 disorder (HCC)  - Continue Elavil      Hyperlipidemia  - Start Lipitor      Asthma Normocytic anemia  - Stable      Morbid obesity (HCC)      SAMSON (obstructive sleep apnea)      Schizophrenia (HCC)      History of venous thromboembolism  - Continue Eliquis      Dispo/Discharge Planning:     Home in 24-48 hours    Diet: Regular diet, NPO at midnight  VTE ppx: Eliquis  Code status: [address] Problems:  Principal Problem:    Chest pain  Active Problems:    HTN (hypertension)    Bipolar 1 disorder (HCC)    Hyperlipidemia    Asthma    Normocytic anemia    Morbid obesity (HCC)    SAMSON (obstructive sleep apnea)    Schizophrenia (HCC)    History of venous thromboembolism  Resolved Problems:    * No resolved hospital problems.  *       Past History:     Past Medical History:   Diagnosis Date    Anemia     no supplements     Anxiety     Managed with meds     Asthma     none since stopped smoking-- 6 months ago, Inhalers prn     Benzodiazepine (tranquilizer) overdose 20 yrs ago    BMI 36.0-36.9,adult     Chronic pain     back, knees    Depression     medication    GERD (gastroesophageal reflux disease)     controlled with medication    Hypertension     controlled with med    Obese     bmi =35    SAMSON (obstructive sleep apnea)     DOES NOT WEAR CPAP    Panic attacks     S/P gastric bypass 2013    wt loss 60lbs-- gained all back    Schizophrenia ([address]     Seizures ([address]     one seizure several yrs ago- undetermined cause     Thromboembolus ([address] 2010    left leg-- IVF PLACED    Thyroid disease     HYPOTHYROIDISM       Past Surgical History:   Procedure Laterality Date    BREAST REDUCTION SURGERY      COLONOSCOPY N/A 3/7/2018    COLONOSCOPY  BMI 39 performed by Latina Schirmer, MD at [facility] ENDOSCOPY    GASTRECTOMY  3/4/13    Sleeve    HYSTERECTOMY (CERVIX STATUS UNKNOWN)      IR IVC FILTER RETRIEVAL  7/8/2021    IR IVC FILTER RETRIEVAL  7/8/2021    IR IVC FILTER RETRIEVAL 7/8/2021 [facility] SPECIALS    IR THROMB ART/KARIN REMOV SUBSQ DAY  3/17/2021    IR TRANSCATH INFUSION THROMB NONCORONARY  3/16/2021 OVARY REMOVAL      TUBAL LIGATION      VASCULAR SURGERY  2010    IVF PLACED        Social History     Tobacco Use    Smoking status: Former     Packs/day: 0.25     Types: Cigarettes     Quit date: 6/3/2018     Years since quittin.1    Smokeless tobacco: Never    Tobacco comments:     Quit smoking: current social smoker (noted 18)   Substance Use Topics    Alcohol use: Yes     Alcohol/week: 0.0 standard drinks      Social History     Substance and Sexual Activity   Drug Use No       Family History   Problem Relation Age of Onset    Cancer Father     Breast Cancer Neg Hx         Immunization History   Administered Date(s) Administered    Influenza Virus Vaccine 10/05/2008     Allergies   Allergen Reactions    Ace Inhibitors Anaphylaxis and Swelling     Lip/Mouth swelling       Prior to Admit Medications:  Current Outpatient Medications   Medication Instructions    ALPRAZolam (XANAX) 2 mg, Oral, 3 TIMES DAILY PRN    amitriptyline (ELAVIL) 150 mg, Oral    apixaban (ELIQUIS) 5 MG TABS tablet Take 2 twice daily for 5-1/2 days and then 1 twice daily. Indications: blood clot in a deep vein of the extremities, a clot in the lung    dilTIAZem (TIAZAC) 120 mg, Oral, DAILY    hydroCHLOROthiazide (HYDRODIURIL) 25 mg, Oral, DAILY    HYDROcodone-acetaminophen (NORCO)  MG per tablet 1 tablet, Oral    lidocaine 4 % external patch Apply one patch to low back for 12 hours daily then remove    omeprazole (PRILOSEC) 40 mg, Oral, 2 TIMES DAILY    potassium chloride (KLOR-CON M) 10 MEQ extended release tablet 10 mEq, Oral, DAILY    zolpidem (AMBIEN) 10 mg, Oral         Objective:   Patient Vitals for the past 24 hrs:   Temp Pulse Resp BP SpO2   22 1853 98 °F (36.7 °C) 86 16 (!) 155/91 98 %       Oxygen Therapy  SpO2: 98 %    Estimated body mass index is 35.44 kg/m² as calculated from the following:    Height as of an earlier encounter on 22: 5' 9\" (1.753 m).     Weight as of an earlier encounter on 22: 240 lb (108.9 kg). No intake or output data in the 24 hours ending 08/01/22 1933      Physical Exam:  Blood pressure (!) 155/91, pulse 86, temperature 98 °F (36.7 °C), temperature source Oral, resp. rate 16, SpO2 98 %. General:    Well nourished. Head:  Normocephalic, atraumatic  Eyes:  Sclerae appear normal.  Pupils equally round. ENT:  Nares appear normal, no drainage. Moist oral mucosa  Neck:  No restricted ROM. Trachea midline   CV:   RRR. No m/r/g. No jugular venous distension. Tender to palpation on chest  Lungs:   CTAB. No wheezing, rhonchi, or rales. Symmetric expansion. Abdomen: Bowel sounds present. Soft, nontender, nondistended. Extremities: No cyanosis or clubbing. No edema  Skin:     No rashes and normal coloration. Warm and dry. Neuro:  CN II-XII grossly intact. Sensation intact. A&Ox3  Psych:  Normal mood and affect.       I have personally reviewed labs and tests showing:  Recent Labs:  Recent Results (from the past 24 hour(s))   EKG 12 Lead    Collection Time: 08/01/22 11:57 AM   Result Value Ref Range    Ventricular Rate 107 BPM    Atrial Rate 107 BPM    P-R Interval 172 ms    QRS Duration 92 ms    Q-T Interval 338 ms    QTc Calculation (Bazett) 451 ms    P Axis 64 degrees    R Axis 19 degrees    T Axis 54 degrees    Diagnosis Sinus tachycardia    CBC    Collection Time: 08/01/22 12:02 PM   Result Value Ref Range    WBC 8.7 4.3 - 11.1 K/uL    RBC 4.21 4.05 - 5.2 M/uL    Hemoglobin 11.1 (L) 11.7 - 15.4 g/dL    Hematocrit 34.7 (L) 35.8 - 46.3 %    MCV 82.4 79.6 - 97.8 FL    MCH 26.4 26.1 - 32.9 PG    MCHC 32.0 31.4 - 35.0 g/dL    RDW 18.1 (H) 11.9 - 14.6 %    Platelets 871 338 - 158 K/uL    MPV 9.9 9.4 - 12.3 FL    nRBC 0.00 0.0 - 0.2 K/uL   Comprehensive Metabolic Panel    Collection Time: 08/01/22 12:02 PM   Result Value Ref Range    Sodium 140 136 - 145 mmol/L    Potassium 4.2 3.5 - 5.1 mmol/L    Chloride 109 (H) 98 - 107 mmol/L    CO2 26 21 - 32 mmol/L    Anion Gap 5 (L) 7 - 16 mmol/L    Glucose 96 65 - 100 mg/dL    BUN 23 6 - 23 MG/DL    Creatinine 0.96 0.6 - 1.0 MG/DL    GFR African American >60 >60 ml/min/1.73m2    GFR Non- >60 >60 ml/min/1.73m2    Calcium 9.3 8.3 - 10.4 MG/DL    Total Bilirubin 0.4 0.2 - 1.1 MG/DL    ALT 25 12 - 65 U/L    AST 19 15 - 37 U/L    Alk Phosphatase 132 50 - 136 U/L    Total Protein 7.7 6.3 - 8.2 g/dL    Albumin 3.6 3.5 - 5.0 g/dL    Globulin 4.1 (H) 2.3 - 3.5 g/dL    Albumin/Globulin Ratio 0.9 (L) 1.2 - 3.5     Troponin    Collection Time: 08/01/22 12:02 PM   Result Value Ref Range    Troponin, High Sensitivity 13.0 0 - 14 pg/mL   Troponin    Collection Time: 08/01/22  2:24 PM   Result Value Ref Range    Troponin, High Sensitivity 22.9 (H) 0 - 14 pg/mL       I have personally reviewed imaging studies showing:  CT Head W/O Contrast    Result Date: 8/1/2022  EXAMINATION: CT HEAD WO CONTRAST 8/1/2022 2:05 PM ACCESSION NUMBER: [id number] COMPARISON: None available INDICATION: Headache TECHNIQUE: Multiple-row detector helical CT examination of the head without intravenous contrast. Radiation dose reduction techniques were used for this study. Our CT scanners use one or all of the following: Automated exposure control, adjustment of the mA and/or kV according to patient size, iterative reconstruction. FINDINGS: There is no midline shift or mass lesion. Scattered periventricular hypodensities, which are nonspecific but commonly associated with chronic small vessel ischemic changes. There is no evidence of intracranial hemorrhage or acute infarct. No fractures are evident. The sinuses are pneumatized. No acute intra-abdominal abnormality. Chronic small vessel ischemic changes. XR CHEST PORTABLE    Result Date: 8/1/2022  EXAMINATION: XR CHEST PORTABLE 8/1/2022 3:31 PM ACCESSION NUMBER: [id number] COMPARISON: Chest CT 3/15/2021. Chest radiograph 3/15/2021. INDICATION: chest pain TECHNIQUE: A single view of the chest was obtained. FINDINGS: No focal consolidation. No overt pulmonary edema. No pneumothorax or sizable pleural effusion. Stable enlarged cardiac silhouette. No acute airspace disease. Stable cardiomegaly. Echocardiogram:  No results found for this or any previous visit. Meds previously ordered:  No orders of the defined types were placed in this encounter. Signed:  Wesley Abreu DO    Part of this note may have been written by using a voice dictation software. The note has been proof read but may still contain some grammatical/other typographical errors.

## 2022-08-02 ENCOUNTER — APPOINTMENT (OUTPATIENT)
Dept: NON INVASIVE DIAGNOSTICS | Age: 52
End: 2022-08-02
Attending: FAMILY MEDICINE
Payer: COMMERCIAL

## 2022-08-02 VITALS
RESPIRATION RATE: 20 BRPM | SYSTOLIC BLOOD PRESSURE: 151 MMHG | BODY MASS INDEX: 35.78 KG/M2 | WEIGHT: 242.3 LBS | TEMPERATURE: 98 F | OXYGEN SATURATION: 100 % | DIASTOLIC BLOOD PRESSURE: 100 MMHG | HEART RATE: 92 BPM

## 2022-08-02 LAB
ANION GAP SERPL CALC-SCNC: 5 MMOL/L (ref 7–16)
BUN SERPL-MCNC: 21 MG/DL (ref 6–23)
CALCIUM SERPL-MCNC: 8.5 MG/DL (ref 8.3–10.4)
CHLORIDE SERPL-SCNC: 112 MMOL/L (ref 98–107)
CHOLEST SERPL-MCNC: 200 MG/DL
CO2 SERPL-SCNC: 27 MMOL/L (ref 21–32)
CREAT SERPL-MCNC: 0.8 MG/DL (ref 0.6–1)
D DIMER PPP FEU-MCNC: 0.56 UG/ML(FEU)
ECHO BSA: 2.3 M2
ECHO LA DIAMETER INDEX: 1.92 CM/M2
ECHO LA DIAMETER: 4.3 CM
ECHO LV EDV 3D: 192 ML
ECHO LV EDV INDEX 3D: 86 ML/M2
ECHO LV EJECTION FRACTION 3D: 56 %
ECHO LV ESV 3D: 84 ML
ECHO LV ESV INDEX 3D: 38 ML/M2
ECHO LV FRACTIONAL SHORTENING: 33 % (ref 28–44)
ECHO LV INTERNAL DIMENSION DIASTOLE INDEX: 2.05 CM/M2
ECHO LV INTERNAL DIMENSION DIASTOLIC: 4.6 CM (ref 3.9–5.3)
ECHO LV INTERNAL DIMENSION SYSTOLIC INDEX: 1.38 CM/M2
ECHO LV INTERNAL DIMENSION SYSTOLIC: 3.1 CM
ECHO LV IVSD: 1.1 CM (ref 0.6–0.9)
ECHO LV MASS 2D: 181.2 G (ref 67–162)
ECHO LV MASS 3D INDEX: 75 G/M2
ECHO LV MASS 3D: 168 G
ECHO LV MASS INDEX 2D: 80.9 G/M2 (ref 43–95)
ECHO LV POSTERIOR WALL DIASTOLIC: 1.1 CM (ref 0.6–0.9)
ECHO LV RELATIVE WALL THICKNESS RATIO: 0.48
ERYTHROCYTE [DISTWIDTH] IN BLOOD BY AUTOMATED COUNT: 18.2 % (ref 11.9–14.6)
EST. AVERAGE GLUCOSE BLD GHB EST-MCNC: 117 MG/DL
GLUCOSE SERPL-MCNC: 86 MG/DL (ref 65–100)
HBA1C MFR BLD: 5.7 % (ref 4.8–5.6)
HCT VFR BLD AUTO: 31.3 % (ref 35.8–46.3)
HDLC SERPL-MCNC: 73 MG/DL (ref 40–60)
HDLC SERPL: 2.7 {RATIO}
HGB BLD-MCNC: 9.7 G/DL (ref 11.7–15.4)
LDLC SERPL CALC-MCNC: 115 MG/DL
LV EF: 63 %
LVEF MODALITY: ABNORMAL
MAGNESIUM SERPL-MCNC: 1.8 MG/DL (ref 1.8–2.4)
MCH RBC QN AUTO: 26.2 PG (ref 26.1–32.9)
MCHC RBC AUTO-ENTMCNC: 31 G/DL (ref 31.4–35)
MCV RBC AUTO: 84.6 FL (ref 79.6–97.8)
NRBC # BLD: 0 K/UL (ref 0–0.2)
PLATELET # BLD AUTO: 302 K/UL (ref 150–450)
PMV BLD AUTO: 10.2 FL (ref 9.4–12.3)
POTASSIUM SERPL-SCNC: 4 MMOL/L (ref 3.5–5.1)
RBC # BLD AUTO: 3.7 M/UL (ref 4.05–5.2)
SODIUM SERPL-SCNC: 144 MMOL/L (ref 136–145)
TRIGL SERPL-MCNC: 60 MG/DL (ref 35–150)
TROPONIN I SERPL HS-MCNC: 20.3 PG/ML (ref 0–14)
TROPONIN I SERPL HS-MCNC: 23.6 PG/ML (ref 0–14)
VLDLC SERPL CALC-MCNC: 12 MG/DL (ref 6–23)
WBC # BLD AUTO: 6.3 K/UL (ref 4.3–11.1)

## 2022-08-02 PROCEDURE — 93325 DOPPLER ECHO COLOR FLOW MAPG: CPT | Performed by: INTERNAL MEDICINE

## 2022-08-02 PROCEDURE — 99212 OFFICE O/P EST SF 10 MIN: CPT | Performed by: INTERNAL MEDICINE

## 2022-08-02 PROCEDURE — G0378 HOSPITAL OBSERVATION PER HR: HCPCS

## 2022-08-02 PROCEDURE — 36415 COLL VENOUS BLD VENIPUNCTURE: CPT

## 2022-08-02 PROCEDURE — 80048 BASIC METABOLIC PNL TOTAL CA: CPT

## 2022-08-02 PROCEDURE — 6370000000 HC RX 637 (ALT 250 FOR IP): Performed by: INTERNAL MEDICINE

## 2022-08-02 PROCEDURE — 83735 ASSAY OF MAGNESIUM: CPT

## 2022-08-02 PROCEDURE — 76376 3D RENDER W/INTRP POSTPROCES: CPT | Performed by: INTERNAL MEDICINE

## 2022-08-02 PROCEDURE — 80061 LIPID PANEL: CPT

## 2022-08-02 PROCEDURE — 93308 TTE F-UP OR LMTD: CPT

## 2022-08-02 PROCEDURE — 93308 TTE F-UP OR LMTD: CPT | Performed by: INTERNAL MEDICINE

## 2022-08-02 PROCEDURE — 83036 HEMOGLOBIN GLYCOSYLATED A1C: CPT

## 2022-08-02 PROCEDURE — 96361 HYDRATE IV INFUSION ADD-ON: CPT

## 2022-08-02 PROCEDURE — 2580000003 HC RX 258: Performed by: INTERNAL MEDICINE

## 2022-08-02 PROCEDURE — 85027 COMPLETE CBC AUTOMATED: CPT

## 2022-08-02 PROCEDURE — 84484 ASSAY OF TROPONIN QUANT: CPT

## 2022-08-02 RX ORDER — ATORVASTATIN CALCIUM 40 MG/1
40 TABLET, FILM COATED ORAL NIGHTLY
Qty: 30 TABLET | Refills: 0 | Status: SHIPPED | OUTPATIENT
Start: 2022-08-02 | End: 2022-10-05 | Stop reason: CLARIF

## 2022-08-02 RX ORDER — PANTOPRAZOLE SODIUM 40 MG/1
TABLET, DELAYED RELEASE ORAL
Qty: 60 TABLET | Refills: 0 | Status: SHIPPED | OUTPATIENT
Start: 2022-08-02 | End: 2022-10-05 | Stop reason: CLARIF

## 2022-08-02 RX ADMIN — SODIUM CHLORIDE, PRESERVATIVE FREE 10 ML: 5 INJECTION INTRAVENOUS at 11:03

## 2022-08-02 RX ADMIN — HYDROCHLOROTHIAZIDE 25 MG: 25 TABLET ORAL at 08:56

## 2022-08-02 RX ADMIN — HYDROCODONE BITARTRATE AND ACETAMINOPHEN 1 TABLET: 10; 325 TABLET ORAL at 08:56

## 2022-08-02 RX ADMIN — APIXABAN 5 MG: 5 TABLET, FILM COATED ORAL at 08:56

## 2022-08-02 RX ADMIN — PANTOPRAZOLE SODIUM 40 MG: 40 TABLET, DELAYED RELEASE ORAL at 06:12

## 2022-08-02 RX ADMIN — DILTIAZEM HYDROCHLORIDE 120 MG: 120 CAPSULE, COATED, EXTENDED RELEASE ORAL at 12:32

## 2022-08-02 RX ADMIN — ASPIRIN 81 MG: 81 TABLET, CHEWABLE ORAL at 08:56

## 2022-08-02 ASSESSMENT — ENCOUNTER SYMPTOMS
WHEEZING: 0
SHORTNESS OF BREATH: 0
BLURRED VISION: 0
ABDOMINAL PAIN: 0
COLOR CHANGE: 0
DIARRHEA: 0
NAIL CHANGES: 0
COUGH: 0
BACK PAIN: 0
DOUBLE VISION: 0
BLOATING: 0
NAUSEA: 0

## 2022-08-02 ASSESSMENT — PAIN SCALES - GENERAL
PAINLEVEL_OUTOF10: 0
PAINLEVEL_OUTOF10: 6
PAINLEVEL_OUTOF10: 0
PAINLEVEL_OUTOF10: 0

## 2022-08-02 ASSESSMENT — PAIN DESCRIPTION - DESCRIPTORS: DESCRIPTORS: DISCOMFORT;JABBING

## 2022-08-02 ASSESSMENT — PAIN DESCRIPTION - LOCATION: LOCATION: BACK

## 2022-08-02 ASSESSMENT — PAIN DESCRIPTION - ORIENTATION: ORIENTATION: MID;POSTERIOR

## 2022-08-02 NOTE — PROGRESS NOTES
Dual skin assessment preformed with Angelina Ji RN upon admission to floor. All skin clean, dry, and intact. No impairments noted.

## 2022-08-02 NOTE — PROGRESS NOTES
Discharge instructions reviewed with patient. Opportunity for questions provided. Patient voiced understanding of all discharge instructions.

## 2022-08-02 NOTE — H&P
Patient seen and examined by me. Agree with above note by physician extender. Key findings are: Patient admitted with atypical chest pain. She has had serial negative high-sensitivity troponins. Repeat echocardiogram demonstrates normal left ventricular systolic function and no significant valvular heart disease. Patient reports quick type musculoskeletal/neuropathic type pains that are noncardiac in nature. Vitals:    08/02/22 0035 08/02/22 0507 08/02/22 0830 08/02/22 1214   BP: (!) 144/61 (!) 142/60 (!) 140/91 (!) 151/100   Pulse: 71 71 78 92   Resp: 18 20 20 20   Temp: 96.8 °F (36 °C) 96.8 °F (36 °C) 98.3 °F (36.8 °C) 98 °F (36.7 °C)   TempSrc: Temporal Temporal Oral Oral   SpO2: 100% 100% 99% 100%   Weight:  242 lb 4.8 oz (109.9 kg)          General: Patient is alert and oriented, no apparent distress  HEENT: Pupils equal reactive, oropharynx clear  Chest: Clear to auscultation bilaterally  Cardiovascular: S1-S2 regular with no murmur  Abdomen: Soft positive bowel sounds  Extremities: Soft no edema with intact distal pulses    Principal Problem:    Chest pain  Plan: Noncardiac chest pain. Serial negative high-sensitivity troponins. LVEF is normal on echocardiogram.  Would recommend patient follow-up with primary care physician regarding atypical chest pain. She may be scheduled for follow-up in the office for consideration of noninvasive ischemic assessment.   Active Problems:    History of venous thromboembolism  Plan: Managed per primary team    Bipolar 1 disorder ([address]  Plan: Managed per primary team    Hyperlipidemia  Plan: Continue atorvastatin 40 mg daily    Asthma  Plan: Chronic    Normocytic anemia  Plan: Chronic    HTN (hypertension)  Plan: Stable    Morbid obesity ([address]  Plan: Unchanged    SAMSON (obstructive sleep apnea)  Plan: Encourage compliance    Schizophrenia ([address]  Plan: Encourage medication compliance          SHEY Cardenas MD      [facility] Cardiology Initial Cardiac Evaluation Date of  Admission: 8/1/2022  6:36 PM     Primary Care Physician: Marvin Angelo MD  Primary Cardiologist: PRUDENCE  Referring Physician: Hospitalist   Supervising Cardiologist: Dr. Brit Joshi    Reason for cardiac evaluation: chest pain       Danae Mejias is a 45 y.o. female with prior h/o schizophrenia, Bipolar disease, HLD, HTN, SAMSON, and h/o DVT/bilateral PEs  (on eliquis) who presented to  ER on 8/1/22 with intermittent chest pain that was first noted several months ago. Her sx returned yesterday so she came into ED for further care. She reported mid sternal \"sharp\" CP that feels as if she can't catch her breath. Lasting 3 minutes that resolves with cold water. She  was worried that she had another PE. Labs at  was unremarkable. HS trop was 13 then 22>23>20. EKG with no acute ischemia. Her B/P at  was 174/108. Patient was transferred downtown for further care. Hospitalist admitted patient with chest pain and cardiology was consulted. She is currently chest pain free.        Patient Active Problem List   Diagnosis    Hyponatremia    Leukocytosis    Bipolar 1 disorder (HCC)    Benzodiazepine (tranquilizer) overdose    Hyperlipidemia    Asthma    Normocytic anemia    DVT (deep venous thrombosis) ([facility])    HTN (hypertension)    Morbid obesity ([facility])    SAMSON (obstructive sleep apnea)    Bilateral pulmonary embolism (HCC)    Schizophrenia ([facility])    Chest pain    History of venous thromboembolism       Past Medical History:   Diagnosis Date    Anemia     no supplements     Anxiety     Managed with meds     Asthma     none since stopped smoking-- 6 months ago, Inhalers prn     Benzodiazepine (tranquilizer) overdose 20 yrs ago    BMI 36.0-36.9,adult     Chronic pain     back, knees    Depression     medication    GERD (gastroesophageal reflux disease)     controlled with medication    Hypertension     controlled with med    Obese     bmi =35    SAMSON (obstructive sleep apnea)     DOES NOT WEAR CPAP Panic attacks     S/P gastric bypass     wt loss 60lbs-- gained all back    Schizophrenia (HCC)     Seizures ([facility]     one seizure several yrs ago- undetermined cause     Thromboembolus ([facility] 2010    left leg-- IVF PLACED    Thyroid disease     HYPOTHYROIDISM      Past Surgical History:   Procedure Laterality Date    BREAST REDUCTION SURGERY      COLONOSCOPY N/A 3/7/2018    COLONOSCOPY  BMI 39 performed by Santi Durham MD at [address] [place] [address]  3/4/13    Sleeve    HYSTERECTOMY (CERVIX STATUS UNKNOWN)      IR IVC FILTER RETRIEVAL  2021    IR IVC FILTER RETRIEVAL  2021    IR IVC FILTER RETRIEVAL 2021 [facility] SPECIALS    IR THROMB ART/KARIN REMOV SUBSQ DAY  3/17/2021    IR TRANSCATH INFUSION THROMB NONCORONARY  3/16/2021    OVARY REMOVAL      TUBAL LIGATION      VASCULAR SURGERY  2010    IVF PLACED     Allergies   Allergen Reactions    Ace Inhibitors Anaphylaxis and Swelling     Lip/Mouth swelling        Family History   Problem Relation Age of Onset    Cancer Father     Breast Cancer Neg Hx       Social History     Socioeconomic History    Marital status: Single     Spouse name: Not on file    Number of children: Not on file    Years of education: Not on file    Highest education level: Not on file   Occupational History    Not on file   Tobacco Use    Smoking status: Former     Packs/day: 0.25     Types: Cigarettes     Quit date: 6/3/2018     Years since quittin.1    Smokeless tobacco: Never    Tobacco comments:     Quit smoking: current social smoker (noted 18)   Substance and Sexual Activity    Alcohol use:  Yes     Alcohol/week: 0.0 standard drinks    Drug use: No    Sexual activity: Not on file   Other Topics Concern    Not on file   Social History Narrative    Not on file     Social Determinants of Health     Financial Resource Strain: Not on file   Food Insecurity: Not on file   Transportation Needs: Not on file   Physical Activity: Not on file   Stress: Not on file Social Connections: Not on file   Intimate Partner Violence: Not on file   Housing Stability: Not on file       Current Facility-Administered Medications   Medication Dose Route Frequency    ALPRAZolam (XANAX) tablet 2 mg  2 mg Oral TID PRN    amitriptyline (ELAVIL) tablet 150 mg  150 mg Oral Nightly    apixaban (ELIQUIS) tablet 5 mg  5 mg Oral BID    dilTIAZem (CARDIZEM CD) extended release capsule 120 mg  120 mg Oral Daily    hydroCHLOROthiazide (HYDRODIURIL) tablet 25 mg  25 mg Oral Daily    HYDROcodone-acetaminophen (NORCO)  MG per tablet 1 tablet  1 tablet Oral Q6H PRN    pantoprazole (PROTONIX) tablet 40 mg  40 mg Oral BID AC    zolpidem (AMBIEN) tablet 5 mg  5 mg Oral Nightly PRN    sodium chloride flush 0.9 % injection 5-40 mL  5-40 mL IntraVENous 2 times per day    sodium chloride flush 0.9 % injection 5-40 mL  5-40 mL IntraVENous PRN    0.9 % sodium chloride infusion   IntraVENous PRN    ondansetron (ZOFRAN-ODT) disintegrating tablet 4 mg  4 mg Oral Q8H PRN    Or    ondansetron (ZOFRAN) injection 4 mg  4 mg IntraVENous Q6H PRN    acetaminophen (TYLENOL) tablet 650 mg  650 mg Oral Q6H PRN    Or    acetaminophen (TYLENOL) suppository 650 mg  650 mg Rectal Q6H PRN    polyethylene glycol (GLYCOLAX) packet 17 g  17 g Oral Daily PRN    aspirin chewable tablet 81 mg  81 mg Oral Daily    atorvastatin (LIPITOR) tablet 40 mg  40 mg Oral Nightly    0.9 % sodium chloride infusion   IntraVENous Continuous    potassium chloride (KLOR-CON M) extended release tablet 40 mEq  40 mEq Oral PRN    Or    potassium bicarb-citric acid (EFFER-K) effervescent tablet 40 mEq  40 mEq Oral PRN    Or    potassium chloride 10 mEq/100 mL IVPB (Peripheral Line)  10 mEq IntraVENous PRN    magnesium sulfate 2000 mg in 50 mL IVPB premix  2,000 mg IntraVENous PRN    hydrALAZINE (APRESOLINE) injection 20 mg  20 mg IntraVENous Q6H PRN       Review of Systems   Constitutional: Negative for chills, decreased appetite, diaphoresis, fever, malaise/fatigue, weight gain and weight loss. HENT:  Negative for congestion. Eyes:  Negative for blurred vision and double vision. Cardiovascular:  Positive for chest pain. Negative for dyspnea on exertion, irregular heartbeat, leg swelling, near-syncope, palpitations and syncope. Respiratory:  Negative for cough, shortness of breath and wheezing. Endocrine: Negative for cold intolerance and heat intolerance. Hematologic/Lymphatic: Does not bruise/bleed easily. Skin:  Negative for color change and nail changes. Musculoskeletal:  Negative for back pain, falls and muscle weakness. Gastrointestinal:  Negative for bloating, abdominal pain, diarrhea, melena and nausea. Genitourinary:  Negative for dysuria and frequency. Neurological:  Negative for dizziness, focal weakness, headaches, light-headedness and weakness. Psychiatric/Behavioral:  Negative for altered mental status.        Physical Exam  Vitals:    08/01/22 1853 08/01/22 2059 08/02/22 0035 08/02/22 0507   BP: (!) 155/91 (!) 147/93 (!) 144/61 (!) 142/60   Pulse: 86 91 71 71   Resp: 16 20 18 20   Temp: 98 °F (36.7 °C) 98.1 °F (36.7 °C) 96.8 °F (36 °C) 96.8 °F (36 °C)   TempSrc: Oral Temporal Temporal Temporal   SpO2: 98% 96% 100% 100%   Weight:    242 lb 4.8 oz (109.9 kg)       Physical Exam:  General: Well Developed, Well Nourished, No Acute Distress  HEENT: pupils equal and round, no abnormalities noted  Neck: supple, no JVD, no carotid bruits  Heart: S1S2 with RRR without murmurs or gallops  Lungs: Clear throughout auscultation bilaterally without adventitious sounds  Abd: soft, nontender, nondistended, with good bowel sounds  Ext: warm, no edema, calves supple/nontender, pulses 2+ bilaterally  Skin: warm and dry  Psychiatric: Normal mood and affect  Neurologic: Alert and oriented X 3    Cardiographics    Telemetry: normal sinus rhythm  ECG: normal EKG, normal sinus rhythm  Echocardiogram: March 2021 showed -  Left ventricle: Systolic function was normal. Ejection fraction was  estimated in the range of 55 % to 60 %. There was mild concentric  hypertrophy. -  Left atrium: The atrium was mildly dilated. Labs:   Recent Labs     08/01/22  1202 08/02/22  0338    144   K 4.2 4.0   MG  --  1.8   BUN 23 21   WBC 8.7 6.3   HGB 11.1* 9.7*   HCT 34.7* 31.3*    302   HDL  --  73*        Assessment/Plan:     Assessment:      Principal Problem:    Chest pain- very atypical; CP resolves with cold water. Will check limited echo. Can schedule ischemic w/u outpt. Continue home meds. Active Problems:    History of venous thromboembolism- continue home eliquis       Bipolar 1 disorder ([facility]- per primary team      HTN (hypertension)- uncontrolled at  with B/P 174/108 ? Contributing to her CP. Continue Tiazac and HCTZ. Morbid obesity (HCC)      SAMSON (obstructive sleep apnea)      Schizophrenia ([facility]- per primary team       We appreciate the opportunity to participate in this patient's care.      RACHEL Munoz - CNP  Supervising MD: DR. Alex Li

## 2022-08-02 NOTE — PROGRESS NOTES
Bedside and Verbal shift change report given to myself (oncoming nurse) by Kristen Molina (offgoing nurse). Report included the following information Index, Intake/Output, MAR, Recent Results, and Med Rec Status.

## 2022-08-02 NOTE — DISCHARGE INSTRUCTIONS
Primary care 1 week  Cardiology as directed-please note patient instructed have primary care refer to [facility] cardiology for noninvasive work-up if visit not scheduled at time of discharge. Plan was discussed with patient and staff. All questions answered. Patient was stable at time of discharge.   Instructions given to call a physician or return if any concerns

## 2022-08-02 NOTE — DISCHARGE SUMMARY
abnormalities. Seen by cardiology and outpatient noninvasive work-up recommended. We will contact cardiology to arrange outpatient follow-up. Patient with long history of GERD. Will discharge with recommendation for short-term twice daily PPI and then resume once daily omeprazole. Appears stable for discharge home. Disposition: stable  Diet: ADULT DIET; Regular  Code Status: Full Code    Follow Ups:  Primary care next 1 week  Cardiology as scheduled for noninvasive work-up--see below may need referral to cardiology for noninvasive work-up if cardiology did not schedule prior to discharge. Time spent in patient discharge and coordination 41 minutes. Follow up labs/diagnostics (ultimately defer to outpatient provider):  Primary care 1 week  Cardiology as directed-please note patient instructed have primary care refer to [facility] cardiology for noninvasive work-up if visit not scheduled at time of discharge. Plan was discussed with patient and staff. All questions answered. Patient was stable at time of discharge. Instructions given to call a physician or return if any concerns. Current Discharge Medication List        START taking these medications    Details   atorvastatin (LIPITOR) 40 MG tablet Take 1 tablet by mouth nightly  Qty: 30 tablet, Refills: 0      pantoprazole (PROTONIX) 40 MG tablet Take 1 twice daily until finished and then may resume previous omeprazole  Qty: 60 tablet, Refills: 0           CONTINUE these medications which have NOT CHANGED    Details   ALPRAZolam (XANAX) 2 MG tablet Take 2 mg by mouth 3 times daily as needed. amitriptyline (ELAVIL) 150 MG tablet Take 150 mg by mouth      apixaban (ELIQUIS) 5 MG TABS tablet Take 2 twice daily for 5-1/2 days and then 1 twice daily.  Indications: blood clot in a deep vein of the extremities, a clot in the lung      dilTIAZem (TIAZAC) 120 MG extended release capsule Take 120 mg by mouth daily      hydroCHLOROthiazide (HYDRODIURIL) 25 MG tablet Take 25 mg by mouth daily      HYDROcodone-acetaminophen (NORCO)  MG per tablet Take 1 tablet by mouth.      lidocaine 4 % external patch Apply one patch to low back for 12 hours daily then remove      potassium chloride (KLOR-CON M) 10 MEQ extended release tablet Take 10 mEq by mouth daily      zolpidem (AMBIEN) 10 MG tablet Take 10 mg by mouth. STOP taking these medications       omeprazole (PRILOSEC) 20 MG delayed release capsule Comments:   Reason for Stopping:               Procedures done this admission:  * No surgery found *    Consults this admission:  IP CONSULT TO CARDIOLOGY    Echocardiogram results:  08/01/22    TRANSTHORACIC ECHOCARDIOGRAM (TTE) LIMITED (CONTRAST/BUBBLE/3D PRN) 08/02/2022 11:07 AM (Final)    Interpretation Summary  Formatting of this result is different from the original.      Left Ventricle: Normal left ventricular systolic function with a visually estimated EF of 60 - 65%. Left ventricle size is normal. Normal wall thickness. Normal wall motion. Technical qualifiers: Color flow Doppler was performed. Signed by: Tasneem Robins MD on 8/2/2022 11:07 AM      Diagnostic Imaging/Tests:   CT Head W/O Contrast    Result Date: 8/1/2022  No acute intra-abdominal abnormality. Chronic small vessel ischemic changes. XR CHEST PORTABLE    Result Date: 8/1/2022  No acute airspace disease. Stable cardiomegaly. No results for input(s): CULTURE in the last 720 hours.     Labs: Results:       BMP, Mg, Phos Recent Labs     08/01/22  1202 08/02/22  0338    144   K 4.2 4.0   * 112*   CO2 26 27   ANIONGAP 5* 5*   BUN 23 21   CREATININE 0.96 0.80   LABGLOM >60 >60   GFRAA >60 >60   CALCIUM 9.3 8.5   GLUCOSE 96 86   MG  --  1.8      CBC Recent Labs     08/01/22  1202 08/02/22  0338   WBC 8.7 6.3   RBC 4.21 3.70*   HGB 11.1* 9.7*   HCT 34.7* 31.3*   MCV 82.4 84.6   MCH 26.4 26.2   MCHC 32.0 31.0*   RDW 18.1* 18.2*    302   MPV 9.9 10. 2   NRBC 0.00 0.00      LFT Recent Labs     08/01/22  1202   BILITOT 0.4   ALKPHOS 132   AST 19   ALT 25   PROT 7.7   LABALBU 3.6   GLOB 4.1*      Cardiac  Lab Results   Component Value Date/Time    TROPHS 20.3 08/02/2022 01:30 AM    TROPHS 23.6 08/01/2022 10:45 PM    TROPHS 22.9 08/01/2022 02:24 PM      Coags Lab Results   Component Value Date/Time    PROTIME 13.2 09/27/2021 03:43 PM    PROTIME 14.4 07/02/2021 12:28 PM    PROTIME 14.8 03/17/2021 03:46 AM    INR 1.0 09/27/2021 03:43 PM    INR 1.1 07/02/2021 12:28 PM    INR 1.1 03/17/2021 03:46 AM    APTT 26.6 09/27/2021 03:43 PM    APTT 36.3 03/17/2021 10:21 AM    APTT 35.9 03/17/2021 03:46 AM      A1c Lab Results   Component Value Date/Time    LABA1C 5.7 08/02/2022 03:38 AM     08/02/2022 03:38 AM      Lipids Lab Results   Component Value Date/Time    CHOL 200 08/02/2022 03:38 AM    LDLCALC 115 08/02/2022 03:38 AM    LABVLDL 12 08/02/2022 03:38 AM    HDL 73 08/02/2022 03:38 AM    CHOLHDLRATIO 2.7 08/02/2022 03:38 AM    TRIG 60 08/02/2022 03:38 AM      Thyroid  No results found for: Dahlia Lindondina     Most Recent UA Lab Results   Component Value Date/Time    COLORU YELLOW 03/15/2021 08:12 PM    SPECGRAV 1.010 03/15/2021 08:12 PM    PROTEINU Negative 03/15/2021 08:12 PM    GLUCOSEU Negative 03/15/2021 08:12 PM    KETUA Negative 03/15/2021 08:12 PM    BILIRUBINUR Negative 03/15/2021 08:12 PM    BLOODU Negative 03/15/2021 08:12 PM    NITRU Negative 03/15/2021 08:12 PM    LEUKOCYTESUR Negative 03/15/2021 08:12 PM          All Labs from Last 24 Hrs:  Recent Results (from the past 24 hour(s))   Troponin    Collection Time: 08/01/22 10:45 PM   Result Value Ref Range    Troponin, High Sensitivity 23.6 (H) 0 - 14 pg/mL   D-dimer, quantitative    Collection Time: 08/01/22 10:45 PM   Result Value Ref Range    D-Dimer, Quant 0.56 (H) <0.56 ug/ml(FEU)   Troponin    Collection Time: 08/02/22  1:30 AM   Result Value Ref Range    Troponin, High Sensitivity 20.3 (H) 0 - 14 pg/mL   CBC    Collection Time: 08/02/22  3:38 AM   Result Value Ref Range    WBC 6.3 4.3 - 11.1 K/uL    RBC 3.70 (L) 4.05 - 5.2 M/uL    Hemoglobin 9.7 (L) 11.7 - 15.4 g/dL    Hematocrit 31.3 (L) 35.8 - 46.3 %    MCV 84.6 79.6 - 97.8 FL    MCH 26.2 26.1 - 32.9 PG    MCHC 31.0 (L) 31.4 - 35.0 g/dL    RDW 18.2 (H) 11.9 - 14.6 %    Platelets 345 053 - 487 K/uL    MPV 10.2 9.4 - 12.3 FL    nRBC 0.00 0.0 - 0.2 K/uL   Lipid panel - fasting    Collection Time: 08/02/22  3:38 AM   Result Value Ref Range    Cholesterol, Total 200 (H) <200 MG/DL    Triglycerides 60 35 - 150 MG/DL    HDL 73 (H) 40 - 60 MG/DL    LDL Calculated 115 (H) <100 MG/DL    VLDL Cholesterol Calculated 12 6.0 - 23.0 MG/DL    Chol/HDL Ratio 2.7     Basic Metabolic Panel w/ Reflex to MG    Collection Time: 08/02/22  3:38 AM   Result Value Ref Range    Sodium 144 136 - 145 mmol/L    Potassium 4.0 3.5 - 5.1 mmol/L    Chloride 112 (H) 98 - 107 mmol/L    CO2 27 21 - 32 mmol/L    Anion Gap 5 (L) 7 - 16 mmol/L    Glucose 86 65 - 100 mg/dL    BUN 21 6 - 23 MG/DL    Creatinine 0.80 0.6 - 1.0 MG/DL    GFR African American >60 >60 ml/min/1.73m2    GFR Non- >60 >60 ml/min/1.73m2    Calcium 8.5 8.3 - 10.4 MG/DL   Magnesium    Collection Time: 08/02/22  3:38 AM   Result Value Ref Range    Magnesium 1.8 1.8 - 2.4 mg/dL   Hemoglobin A1c    Collection Time: 08/02/22  3:38 AM   Result Value Ref Range    Hemoglobin A1C 5.7 (H) 4.8 - 5.6 %    eAG 117 mg/dL   Transthoracic echocardiogram (TTE) limited with contrast, bubble, strain, and 3D PRN    Collection Time: 08/02/22 10:50 AM   Result Value Ref Range    LA Diameter 4.3 cm    EF 3D 56 %    LV EDV 3D 192 mL    LV ESV 3D 84 mL    LV Mass 3D 168.0 g    IVSd 1.1 (A) 0.6 - 0.9 cm    LVIDd 4.6 3.9 - 5.3 cm    LVIDs 3.1 cm    LVPWd 1.1 (A) 0.6 - 0.9 cm    Body Surface Area 2.3 m2    Fractional Shortening 2D 33 28 - 44 %    LV ESV Index 3D 38 mL/m2    LV EDV Index 3D 86 mL/m2    LVIDd Index 2.05 cm/m2    LVIDs Index 1.38 cm/m2    LV RWT Ratio 0.48     LV Mass 2D 181.2 (A) 67 - 162 g    LV Mass 2D Index 80.9 43 - 95 g/m2    LV Mass Index 3D 75.0 g/m2    LA Size Index 1.92 cm/m2       Allergies   Allergen Reactions    Ace Inhibitors Anaphylaxis and Swelling     Lip/Mouth swelling       Immunization History   Administered Date(s) Administered    Influenza Virus Vaccine 10/05/2008       Recent Vital Data:  Patient Vitals for the past 24 hrs:   Temp Pulse Resp BP SpO2   08/02/22 1214 98 °F (36.7 °C) 92 20 (!) 151/100 100 %   08/02/22 0830 98.3 °F (36.8 °C) 78 20 (!) 140/91 99 %   08/02/22 0507 96.8 °F (36 °C) 71 20 (!) 142/60 100 %   08/02/22 0035 96.8 °F (36 °C) 71 18 (!) 144/61 100 %   08/01/22 2059 98.1 °F (36.7 °C) 91 20 (!) 147/93 96 %   08/01/22 1853 98 °F (36.7 °C) 86 16 (!) 155/91 98 %       Oxygen Therapy  SpO2: 100 %  O2 Device: None (Room air)    Estimated body mass index is 35.78 kg/m² as calculated from the following:    Height as of an earlier encounter on 8/1/22: 5' 9\" (1.753 m). Weight as of this encounter: 242 lb 4.8 oz (109.9 kg). Intake/Output Summary (Last 24 hours) at 8/2/2022 1428  Last data filed at 8/2/2022 0848  Gross per 24 hour   Intake 60 ml   Output --   Net 60 ml         Physical Exam:    General:    Well nourished. No overt distress  Head:  Normocephalic, atraumatic  Eyes:  Sclerae appear normal.  Pupils equally round. HENT:  Nares appear normal, no drainage. Moist mucous membranes  Neck:  No restricted ROM. Trachea midline  CV:   RRR. No m/r/g. No JVD  Lungs:   CTAB. No wheezing, rhonchi, or rales. Respirations even, unlabored  Abdomen:   Soft, nontender, nondistended. Extremities: Warm and dry. No cyanosis or clubbing. No edema. Skin:     No rashes. Normal coloration  Neuro:  CN II-XII grossly intact. No focal motor weakness    Signed:  Natalie Church DO    Part of this note may have been written by using a voice dictation software.   The note has been proof read but may still contain some grammatical/other typographical errors.

## 2022-10-05 ENCOUNTER — TELEPHONE (OUTPATIENT)
Dept: INTERNAL MEDICINE CLINIC | Facility: CLINIC | Age: 52
End: 2022-10-05

## 2022-10-05 ENCOUNTER — OFFICE VISIT (OUTPATIENT)
Dept: INTERNAL MEDICINE CLINIC | Facility: CLINIC | Age: 52
End: 2022-10-05
Payer: COMMERCIAL

## 2022-10-05 VITALS
BODY MASS INDEX: 31.52 KG/M2 | HEIGHT: 68 IN | WEIGHT: 208 LBS | HEART RATE: 86 BPM | OXYGEN SATURATION: 99 % | SYSTOLIC BLOOD PRESSURE: 146 MMHG | DIASTOLIC BLOOD PRESSURE: 94 MMHG | RESPIRATION RATE: 18 BRPM

## 2022-10-05 DIAGNOSIS — M25.561 CHRONIC PAIN OF BOTH KNEES: ICD-10-CM

## 2022-10-05 DIAGNOSIS — Z23 FLU VACCINE NEED: ICD-10-CM

## 2022-10-05 DIAGNOSIS — Z13.6 SCREENING, ISCHEMIC HEART DISEASE: ICD-10-CM

## 2022-10-05 DIAGNOSIS — M25.562 CHRONIC PAIN OF BOTH KNEES: ICD-10-CM

## 2022-10-05 DIAGNOSIS — K21.9 GASTROESOPHAGEAL REFLUX DISEASE WITHOUT ESOPHAGITIS: ICD-10-CM

## 2022-10-05 DIAGNOSIS — I10 PRIMARY HYPERTENSION: ICD-10-CM

## 2022-10-05 DIAGNOSIS — I26.99 BILATERAL PULMONARY EMBOLISM (HCC): ICD-10-CM

## 2022-10-05 DIAGNOSIS — Z11.59 NEED FOR HEPATITIS C SCREENING TEST: ICD-10-CM

## 2022-10-05 DIAGNOSIS — F51.01 PRIMARY INSOMNIA: ICD-10-CM

## 2022-10-05 DIAGNOSIS — G89.29 CHRONIC PAIN OF BOTH KNEES: ICD-10-CM

## 2022-10-05 DIAGNOSIS — I82.499 DEEP VEIN THROMBOSIS (DVT) OF OTHER VEIN OF LOWER EXTREMITY, UNSPECIFIED CHRONICITY, UNSPECIFIED LATERALITY (HCC): Primary | ICD-10-CM

## 2022-10-05 DIAGNOSIS — Z13.1 SCREENING FOR DIABETES MELLITUS: ICD-10-CM

## 2022-10-05 DIAGNOSIS — I82.499 DEEP VEIN THROMBOSIS (DVT) OF OTHER VEIN OF LOWER EXTREMITY, UNSPECIFIED CHRONICITY, UNSPECIFIED LATERALITY (HCC): ICD-10-CM

## 2022-10-05 DIAGNOSIS — F31.9 BIPOLAR AFFECTIVE DISORDER, REMISSION STATUS UNSPECIFIED (HCC): ICD-10-CM

## 2022-10-05 DIAGNOSIS — E78.5 HYPERLIPIDEMIA, UNSPECIFIED HYPERLIPIDEMIA TYPE: ICD-10-CM

## 2022-10-05 DIAGNOSIS — Z13.29 SCREENING FOR THYROID DISORDER: ICD-10-CM

## 2022-10-05 PROCEDURE — 90674 CCIIV4 VAC NO PRSV 0.5 ML IM: CPT | Performed by: FAMILY MEDICINE

## 2022-10-05 PROCEDURE — 90471 IMMUNIZATION ADMIN: CPT | Performed by: FAMILY MEDICINE

## 2022-10-05 PROCEDURE — 99204 OFFICE O/P NEW MOD 45 MIN: CPT | Performed by: FAMILY MEDICINE

## 2022-10-05 RX ORDER — QUETIAPINE FUMARATE 200 MG/1
TABLET, FILM COATED ORAL
COMMUNITY
Start: 2022-09-13

## 2022-10-05 RX ORDER — TRAZODONE HYDROCHLORIDE 50 MG/1
TABLET ORAL
COMMUNITY
Start: 2022-09-13 | End: 2022-10-05

## 2022-10-05 RX ORDER — DEXTROAMPHETAMINE SACCHARATE, AMPHETAMINE ASPARTATE MONOHYDRATE, DEXTROAMPHETAMINE SULFATE AND AMPHETAMINE SULFATE 5; 5; 5; 5 MG/1; MG/1; MG/1; MG/1
20 CAPSULE, EXTENDED RELEASE ORAL
COMMUNITY

## 2022-10-05 RX ORDER — DILTIAZEM HYDROCHLORIDE 120 MG/1
120 CAPSULE, EXTENDED RELEASE ORAL DAILY
Qty: 90 CAPSULE | Refills: 3 | Status: SHIPPED | OUTPATIENT
Start: 2022-10-05

## 2022-10-05 RX ORDER — ZOLPIDEM TARTRATE 10 MG/1
10 TABLET ORAL NIGHTLY PRN
Qty: 30 TABLET | Refills: 3 | Status: SHIPPED | OUTPATIENT
Start: 2022-10-05 | End: 2023-10-05

## 2022-10-05 RX ORDER — AMITRIPTYLINE HYDROCHLORIDE 150 MG/1
150 TABLET, FILM COATED ORAL NIGHTLY
Qty: 90 TABLET | Refills: 2 | Status: SHIPPED | OUTPATIENT
Start: 2022-10-05

## 2022-10-05 RX ORDER — OMEPRAZOLE 40 MG/1
40 CAPSULE, DELAYED RELEASE ORAL
Qty: 90 CAPSULE | Refills: 1 | Status: SHIPPED | OUTPATIENT
Start: 2022-10-05

## 2022-10-05 ASSESSMENT — PATIENT HEALTH QUESTIONNAIRE - PHQ9
SUM OF ALL RESPONSES TO PHQ QUESTIONS 1-9: 9
9. THOUGHTS THAT YOU WOULD BE BETTER OFF DEAD, OR OF HURTING YOURSELF: 0
1. LITTLE INTEREST OR PLEASURE IN DOING THINGS: 0
SUM OF ALL RESPONSES TO PHQ QUESTIONS 1-9: 9
2. FEELING DOWN, DEPRESSED OR HOPELESS: 3
SUM OF ALL RESPONSES TO PHQ QUESTIONS 1-9: 9
4. FEELING TIRED OR HAVING LITTLE ENERGY: 2
3. TROUBLE FALLING OR STAYING ASLEEP: 2
8. MOVING OR SPEAKING SO SLOWLY THAT OTHER PEOPLE COULD HAVE NOTICED. OR THE OPPOSITE, BEING SO FIGETY OR RESTLESS THAT YOU HAVE BEEN MOVING AROUND A LOT MORE THAN USUAL: 0
SUM OF ALL RESPONSES TO PHQ QUESTIONS 1-9: 9
SUM OF ALL RESPONSES TO PHQ9 QUESTIONS 1 & 2: 3
5. POOR APPETITE OR OVEREATING: 2
10. IF YOU CHECKED OFF ANY PROBLEMS, HOW DIFFICULT HAVE THESE PROBLEMS MADE IT FOR YOU TO DO YOUR WORK, TAKE CARE OF THINGS AT HOME, OR GET ALONG WITH OTHER PEOPLE: 2
7. TROUBLE CONCENTRATING ON THINGS, SUCH AS READING THE NEWSPAPER OR WATCHING TELEVISION: 0
6. FEELING BAD ABOUT YOURSELF - OR THAT YOU ARE A FAILURE OR HAVE LET YOURSELF OR YOUR FAMILY DOWN: 0

## 2022-10-05 NOTE — PROGRESS NOTES
Laurent Alvarado (:  1970) is a 45 y.o. female,New patient, here for evaluation of the following chief complaint(s):  New Patient (Est Care with a new doctor. Hist of high blood pressure. On Eliquis for the rest of her life due to blood clots.) and Referral - General (Is wanting to see about getting sent to someone for her pain medication.)         ASSESSMENT/PLAN:  1. Deep vein thrombosis (DVT) of other vein of lower extremity, unspecified chronicity, unspecified laterality (HCC)  -     apixaban (ELIQUIS) 5 MG TABS tablet; Take 1 tablet by mouth 2 times daily Take 2 twice daily for 5-1/2 days and then 1 twice daily. Indications: blood clot in a deep vein of the extremities, a clot in the lung, Disp-60 tablet, R-11Normal  2. Bilateral pulmonary embolism (HCC)  -     apixaban (ELIQUIS) 5 MG TABS tablet; Take 1 tablet by mouth 2 times daily Take 2 twice daily for 5-1/2 days and then 1 twice daily. Indications: blood clot in a deep vein of the extremities, a clot in the lung, Disp-60 tablet, R-11Normal  3. Primary hypertension  -     dilTIAZem (TIAZAC) 120 MG extended release capsule; Take 1 capsule by mouth daily, Disp-90 capsule, R-3Normal  4. Hyperlipidemia, unspecified hyperlipidemia type  -     Comprehensive Metabolic Panel; Future  -     Lipid Panel; Future  5. Gastroesophageal reflux disease without esophagitis  -     omeprazole (PRILOSEC) 40 MG delayed release capsule; Take 1 capsule by mouth every morning (before breakfast), Disp-90 capsule, R-1Normal  6. Primary insomnia  -     amitriptyline (ELAVIL) 150 MG tablet; Take 1 tablet by mouth nightly, Disp-90 tablet, R-2Normal  -     zolpidem (AMBIEN) 10 MG tablet; Take 1 tablet by mouth nightly as needed for Sleep., Disp-30 tablet, R-3Normal  7. Chronic pain of both knees  -     [facility]  8. Screening for diabetes mellitus  -     Comprehensive Metabolic Panel;  Future  -     Hemoglobin A1C; Future  -     Urinalysis with Reflex to Culture; Future  9. Screening for thyroid disorder  -     TSH with Reflex; Future  10. Screening, ischemic heart disease  -     Comprehensive Metabolic Panel; Future  -     Lipid Panel; Future  -     CBC with Auto Differential; Future  -     Urinalysis with Reflex to Culture; Future  11. Need for hepatitis C screening test  -     Hepatitis C Antibody; Future  12. Bipolar affective disorder, remission status unspecified ([facility] [address]  1. DVT with bilateral pulmonary embolism. Continue Eliquis. 2.  Hypertension. Uncontrolled because did not take medications. 2-week follow-up nurse visit for recheck of blood pressure. 3.  Hyperlipidemia under fair control without medications. 4.  GERD. Continue omeprazole. 5.  Chronic pain of both knees. On chronic narcotics. Will refer to pain management clinic. 6.  Bipolar disorder continue follow-up with psychiatrist.  Return in about 2 months (around 12/5/2022) for ffup with labs prior to visit. Subjective   SUBJECTIVE/OBJECTIVE:  28-year-old female comes in to [facility]. Medical illnesses include  1. DVT  2017. Has mckinley filter which was removed 2022. Bilateral pulmonary embolism 2022. Patient on Eliquis. 2.  Hypertension 2017. Patient on diltiazem. Ran out of bp meds. Low salt diet. Has lost 50 lbs. 3.  Asthma. Not on any medications. Occasional flare. 4.  Hyperlipidemia. No medications. Last .  5.  GERD. Patient on omeprazole. Takes daily  6. History of bipolar disorder/schizophrenia. Patient on Seroquel,  Xanax. Seeing sychiatrist.  7.  Patient on chronic narcotics. Also on alprazolam and dextroamphetamine. For knee arthritis. 8.  Insomnia patient on Ambien. 9. BMI 31. Has lost weight. 10.  Impaired fasting glucose. Last hemoglobin A1c 5.7      Review of Systems       Objective   Physical Exam  Constitutional:       Appearance: Normal appearance. She is obese. HENT:      Head: Normocephalic.    Neck:      Vascular: No carotid bruit. Cardiovascular:      Rate and Rhythm: Normal rate and regular rhythm. Heart sounds: Normal heart sounds. Pulmonary:      Effort: Pulmonary effort is normal.      Breath sounds: Normal breath sounds. Abdominal:      General: Abdomen is flat. Bowel sounds are normal.      Palpations: Abdomen is soft. Musculoskeletal:      Right lower leg: No edema. Left lower leg: Edema present. Lymphadenopathy:      Cervical: No cervical adenopathy. Neurological:      General: No focal deficit present. Mental Status: She is alert. Psychiatric:         Mood and Affect: Mood normal.                An electronic signature was used to authenticate this note.     --Liseth Hinojosa MD         .

## 2022-10-05 NOTE — TELEPHONE ENCOUNTER
[facility] called and is asking what the Eliquis directions are. The is more than one direction is there and its confusing.

## 2022-11-23 DIAGNOSIS — Z13.6 SCREENING, ISCHEMIC HEART DISEASE: ICD-10-CM

## 2022-11-23 DIAGNOSIS — Z13.1 SCREENING FOR DIABETES MELLITUS: ICD-10-CM

## 2022-11-23 DIAGNOSIS — E78.5 HYPERLIPIDEMIA, UNSPECIFIED HYPERLIPIDEMIA TYPE: ICD-10-CM

## 2022-11-23 DIAGNOSIS — Z13.29 SCREENING FOR THYROID DISORDER: ICD-10-CM

## 2022-11-23 DIAGNOSIS — Z11.59 NEED FOR HEPATITIS C SCREENING TEST: ICD-10-CM

## 2022-11-23 LAB
ALBUMIN SERPL-MCNC: 3.7 G/DL (ref 3.5–5)
ALBUMIN/GLOB SERPL: 1.2 {RATIO} (ref 0.4–1.6)
ALP SERPL-CCNC: 114 U/L (ref 50–136)
ALT SERPL-CCNC: 21 U/L (ref 12–65)
AMORPH CRY URNS QL MICRO: ABNORMAL
ANION GAP SERPL CALC-SCNC: 5 MMOL/L (ref 2–11)
APPEARANCE UR: CLEAR
AST SERPL-CCNC: 17 U/L (ref 15–37)
BACTERIA URNS QL MICRO: ABNORMAL /HPF
BASOPHILS # BLD: 0.1 K/UL (ref 0–0.2)
BASOPHILS NFR BLD: 1 % (ref 0–2)
BILIRUB SERPL-MCNC: 0.4 MG/DL (ref 0.2–1.1)
BILIRUB UR QL: ABNORMAL
BUN SERPL-MCNC: 14 MG/DL (ref 6–23)
CALCIUM SERPL-MCNC: 9.3 MG/DL (ref 8.3–10.4)
CASTS URNS QL MICRO: ABNORMAL /LPF
CHLORIDE SERPL-SCNC: 109 MMOL/L (ref 101–110)
CHOLEST SERPL-MCNC: 195 MG/DL
CO2 SERPL-SCNC: 29 MMOL/L (ref 21–32)
COLOR UR: ABNORMAL
CREAT SERPL-MCNC: 0.9 MG/DL (ref 0.6–1)
CRYSTALS URNS QL MICRO: 0 /LPF
DIFFERENTIAL METHOD BLD: ABNORMAL
EOSINOPHIL # BLD: 0.3 K/UL (ref 0–0.8)
EOSINOPHIL NFR BLD: 3 % (ref 0.5–7.8)
EPI CELLS #/AREA URNS HPF: ABNORMAL /HPF
ERYTHROCYTE [DISTWIDTH] IN BLOOD BY AUTOMATED COUNT: 15.7 % (ref 11.9–14.6)
EST. AVERAGE GLUCOSE BLD GHB EST-MCNC: 120 MG/DL
GLOBULIN SER CALC-MCNC: 3.2 G/DL (ref 2.8–4.5)
GLUCOSE SERPL-MCNC: 86 MG/DL (ref 65–100)
GLUCOSE UR STRIP.AUTO-MCNC: NEGATIVE MG/DL
HBA1C MFR BLD: 5.8 % (ref 4.8–5.6)
HCT VFR BLD AUTO: 33.2 % (ref 35.8–46.3)
HCV AB SER QL: NONREACTIVE
HDLC SERPL-MCNC: 79 MG/DL (ref 40–60)
HDLC SERPL: 2.5 {RATIO}
HGB BLD-MCNC: 10.4 G/DL (ref 11.7–15.4)
HGB UR QL STRIP: NEGATIVE
IMM GRANULOCYTES # BLD AUTO: 0 K/UL (ref 0–0.5)
IMM GRANULOCYTES NFR BLD AUTO: 0 % (ref 0–5)
KETONES UR QL STRIP.AUTO: ABNORMAL MG/DL
LDLC SERPL CALC-MCNC: 102.6 MG/DL
LEUKOCYTE ESTERASE UR QL STRIP.AUTO: ABNORMAL
LYMPHOCYTES # BLD: 3.4 K/UL (ref 0.5–4.6)
LYMPHOCYTES NFR BLD: 41 % (ref 13–44)
MCH RBC QN AUTO: 27.4 PG (ref 26.1–32.9)
MCHC RBC AUTO-ENTMCNC: 31.3 G/DL (ref 31.4–35)
MCV RBC AUTO: 87.6 FL (ref 82–102)
MONOCYTES # BLD: 0.5 K/UL (ref 0.1–1.3)
MONOCYTES NFR BLD: 6 % (ref 4–12)
MUCOUS THREADS URNS QL MICRO: ABNORMAL /LPF
NEUTS SEG # BLD: 4.1 K/UL (ref 1.7–8.2)
NEUTS SEG NFR BLD: 49 % (ref 43–78)
NITRITE UR QL STRIP.AUTO: NEGATIVE
NRBC # BLD: 0 K/UL (ref 0–0.2)
OTHER OBSERVATIONS: ABNORMAL
PH UR STRIP: 6 [PH] (ref 5–9)
PLATELET # BLD AUTO: 307 K/UL (ref 150–450)
PMV BLD AUTO: 9.9 FL (ref 9.4–12.3)
POTASSIUM SERPL-SCNC: 4.4 MMOL/L (ref 3.5–5.1)
PROT SERPL-MCNC: 6.9 G/DL (ref 6.3–8.2)
PROT UR STRIP-MCNC: ABNORMAL MG/DL
RBC # BLD AUTO: 3.79 M/UL (ref 4.05–5.2)
RBC #/AREA URNS HPF: ABNORMAL /HPF
SODIUM SERPL-SCNC: 143 MMOL/L (ref 133–143)
SP GR UR REFRACTOMETRY: >1.035 (ref 1–1.02)
TRIGL SERPL-MCNC: 67 MG/DL (ref 35–150)
TSH W FREE THYROID IF ABNORMAL: 1.15 UIU/ML (ref 0.36–3.74)
URINE CULTURE IF INDICATED: ABNORMAL
UROBILINOGEN UR QL STRIP.AUTO: 1 EU/DL (ref 0.2–1)
VLDLC SERPL CALC-MCNC: 13.4 MG/DL (ref 6–23)
WBC # BLD AUTO: 8.3 K/UL (ref 4.3–11.1)
WBC URNS QL MICRO: ABNORMAL /HPF

## 2022-11-30 ENCOUNTER — OFFICE VISIT (OUTPATIENT)
Dept: INTERNAL MEDICINE CLINIC | Facility: CLINIC | Age: 52
End: 2022-11-30
Payer: COMMERCIAL

## 2022-11-30 VITALS
HEIGHT: 68 IN | BODY MASS INDEX: 33.49 KG/M2 | RESPIRATION RATE: 16 BRPM | WEIGHT: 221 LBS | SYSTOLIC BLOOD PRESSURE: 138 MMHG | TEMPERATURE: 98 F | HEART RATE: 103 BPM | OXYGEN SATURATION: 98 % | DIASTOLIC BLOOD PRESSURE: 84 MMHG

## 2022-11-30 DIAGNOSIS — E66.9 OBESITY (BMI 30.0-34.9): ICD-10-CM

## 2022-11-30 DIAGNOSIS — I82.499 DEEP VEIN THROMBOSIS (DVT) OF OTHER VEIN OF LOWER EXTREMITY, UNSPECIFIED CHRONICITY, UNSPECIFIED LATERALITY (HCC): ICD-10-CM

## 2022-11-30 DIAGNOSIS — G89.29 CHRONIC PAIN OF BOTH KNEES: ICD-10-CM

## 2022-11-30 DIAGNOSIS — M25.561 CHRONIC PAIN OF BOTH KNEES: ICD-10-CM

## 2022-11-30 DIAGNOSIS — R73.09 ELEVATED HEMOGLOBIN A1C: ICD-10-CM

## 2022-11-30 DIAGNOSIS — I10 PRIMARY HYPERTENSION: ICD-10-CM

## 2022-11-30 DIAGNOSIS — M25.562 CHRONIC PAIN OF BOTH KNEES: ICD-10-CM

## 2022-11-30 DIAGNOSIS — E78.5 HYPERLIPIDEMIA, UNSPECIFIED HYPERLIPIDEMIA TYPE: ICD-10-CM

## 2022-11-30 DIAGNOSIS — D64.9 CHRONIC ANEMIA: ICD-10-CM

## 2022-11-30 DIAGNOSIS — D64.9 CHRONIC ANEMIA: Primary | ICD-10-CM

## 2022-11-30 PROBLEM — I82.409 DEEP VEIN THROMBOSIS (DVT) OF LOWER EXTREMITY (HCC): Status: ACTIVE | Noted: 2021-03-17

## 2022-11-30 PROBLEM — E66.811 OBESITY (BMI 30.0-34.9): Status: ACTIVE | Noted: 2022-11-30

## 2022-11-30 PROCEDURE — 3074F SYST BP LT 130 MM HG: CPT | Performed by: FAMILY MEDICINE

## 2022-11-30 PROCEDURE — 99214 OFFICE O/P EST MOD 30 MIN: CPT | Performed by: FAMILY MEDICINE

## 2022-11-30 PROCEDURE — 3078F DIAST BP <80 MM HG: CPT | Performed by: FAMILY MEDICINE

## 2022-11-30 RX ORDER — ALPRAZOLAM 1 MG/1
TABLET ORAL
COMMUNITY
Start: 2022-10-21

## 2022-11-30 RX ORDER — HYDROCHLOROTHIAZIDE 25 MG/1
25 TABLET ORAL EVERY MORNING
Qty: 90 TABLET | Refills: 1 | Status: SHIPPED | OUTPATIENT
Start: 2022-11-30

## 2022-11-30 ASSESSMENT — PATIENT HEALTH QUESTIONNAIRE - PHQ9
5. POOR APPETITE OR OVEREATING: 2
9. THOUGHTS THAT YOU WOULD BE BETTER OFF DEAD, OR OF HURTING YOURSELF: 0
3. TROUBLE FALLING OR STAYING ASLEEP: 1
4. FEELING TIRED OR HAVING LITTLE ENERGY: 1
2. FEELING DOWN, DEPRESSED OR HOPELESS: 1
SUM OF ALL RESPONSES TO PHQ QUESTIONS 1-9: 9
1. LITTLE INTEREST OR PLEASURE IN DOING THINGS: 1
10. IF YOU CHECKED OFF ANY PROBLEMS, HOW DIFFICULT HAVE THESE PROBLEMS MADE IT FOR YOU TO DO YOUR WORK, TAKE CARE OF THINGS AT HOME, OR GET ALONG WITH OTHER PEOPLE: 1
SUM OF ALL RESPONSES TO PHQ QUESTIONS 1-9: 9
8. MOVING OR SPEAKING SO SLOWLY THAT OTHER PEOPLE COULD HAVE NOTICED. OR THE OPPOSITE, BEING SO FIGETY OR RESTLESS THAT YOU HAVE BEEN MOVING AROUND A LOT MORE THAN USUAL: 1
SUM OF ALL RESPONSES TO PHQ QUESTIONS 1-9: 9
7. TROUBLE CONCENTRATING ON THINGS, SUCH AS READING THE NEWSPAPER OR WATCHING TELEVISION: 1
SUM OF ALL RESPONSES TO PHQ9 QUESTIONS 1 & 2: 2
6. FEELING BAD ABOUT YOURSELF - OR THAT YOU ARE A FAILURE OR HAVE LET YOURSELF OR YOUR FAMILY DOWN: 1
SUM OF ALL RESPONSES TO PHQ QUESTIONS 1-9: 9

## 2022-11-30 NOTE — PROGRESS NOTES
Freddy Fritz (:  1970) is a 45 y.o. female,Established patient, here for evaluation of the following chief complaint(s):  Follow-up (Had labs , wants to talk about getting on diet medication) and Referral - General (Would like a new referral to a different pain management )         ASSESSMENT/PLAN:  1. Chronic anemia  -     [facility]  -     CBC with Auto Differential; Future  2. Chronic pain of both knees  -     External Referral To Pain Medicine  3. Obesity (BMI 30.0-34.9)  -     [facility]  4. Primary hypertension  -     hydroCHLOROthiazide (HYDRODIURIL) 25 MG tablet; Take 1 tablet by mouth every morning, Disp-90 tablet, R-1Normal  -     CBC with Auto Differential; Future  -     Comprehensive Metabolic Panel; Future  5. Deep vein thrombosis (DVT) of other vein of lower extremity, unspecified chronicity, unspecified laterality (HCC)  6. Hyperlipidemia, unspecified hyperlipidemia type  -     Comprehensive Metabolic Panel; Future  -     Lipid Panel; Future  7. Elevated hemoglobin A1c  -     Comprehensive Metabolic Panel; Future  -     Hemoglobin A1C; Future  1. Chronic anemia. Was referred to gastroenterology for possible EGD. 2.  Chronic pain of both knees. New referral to pain management. 3.  Obesity. Will refer to dietitian. 4.  Hypertension with bipedal edema. We will add hydrochlorothiazide. 5.  DVT. Continue Eliquis. 6.  Hyperlipidemia well-controlled. 7.  Elevated hemoglobin A1c will recheck on next visit. Return in about 4 months (around 3/30/2023) for ffup with labs prior to visit. Subjective   SUBJECTIVE/OBJECTIVE:  25-year-old female comes in for ffup. Medical illnesses include  1. DVT  . Has mckinley filter which was removed . Bilateral pulmonary embolism . Patient on Eliquis. 2.  Hypertension 2017. Patient on diltiazem. Blood pressure better.   Does complain of bipedal edema. We will add hydrochlorothiazide. 3.  Asthma. Not on any medications. Occasional flare. 4.  Hyperlipidemia. No medications. .  5.  GERD. Patient on omeprazole. Takes daily  6. History of bipolar disorder/schizophrenia. Patient on Seroquel,  Xanax. Seeing psychiatrist.  7.  Patient on chronic narcotics. Also on alprazolam and dextroamphetamine. For knee arthritis. Patient seen pain management but in . Patient will try to see someone in [place]. New referral done. 8.  Insomnia patient on Ambien. 9. BMI 31. Gained weight from last visit. Will refer to dietitian. 10.  Impaired fasting glucose. Last hemoglobin A1c 5.7. Advised to decrease sugar and carb intake. Present A1c 5.8. 11.  Chronic anemia. Had normal colonoscopy. Denies abdominal pain. No menses. Never had egd. Will refer to gastroenterology for evaluation. 12.  Lab work done 11/23/22 hepatitis C negative CMP normal HDL 79  CBC with anemia hemoglobin A1c 5.8 TSH normal       Review of Systems       Objective   Physical Exam  Constitutional:       Appearance: Normal appearance. She is obese. HENT:      Head: Normocephalic. Neck:      Vascular: No carotid bruit. Cardiovascular:      Rate and Rhythm: Regular rhythm. Heart sounds: Normal heart sounds. Pulmonary:      Effort: Pulmonary effort is normal.      Breath sounds: Normal breath sounds. Abdominal:      General: Abdomen is flat. Bowel sounds are normal.      Palpations: Abdomen is soft. Musculoskeletal:      Cervical back: No tenderness. Right lower leg: Edema present. Left lower leg: Edema present. Lymphadenopathy:      Cervical: No cervical adenopathy. Neurological:      General: No focal deficit present. Mental Status: She is alert. Psychiatric:         Mood and Affect: Mood normal.              An electronic signature was used to authenticate this note.     --Cale Ramírez MD

## 2022-12-01 ENCOUNTER — TELEPHONE (OUTPATIENT)
Dept: NUTRITION | Age: 52
End: 2022-12-01

## 2022-12-01 NOTE — TELEPHONE ENCOUNTER
Nutrition Counseling: Contacted pt regarding referral. See notes documented in Nutrition Counseling Referral for details. No further follow-up contact from pt. Will close referral for this office.     [facility]  [phone]

## 2023-02-27 DIAGNOSIS — F51.01 PRIMARY INSOMNIA: ICD-10-CM

## 2023-02-27 RX ORDER — ZOLPIDEM TARTRATE 10 MG/1
10 TABLET ORAL NIGHTLY PRN
Qty: 30 TABLET | Refills: 3 | Status: SHIPPED | OUTPATIENT
Start: 2023-02-27 | End: 2024-02-27

## 2023-07-02 ENCOUNTER — APPOINTMENT (OUTPATIENT)
Dept: CT IMAGING | Age: 53
End: 2023-07-02
Payer: COMMERCIAL

## 2023-07-02 ENCOUNTER — HOSPITAL ENCOUNTER (EMERGENCY)
Age: 53
Discharge: HOME | End: 2023-07-02
Attending: EMERGENCY MEDICINE
Payer: COMMERCIAL

## 2023-07-02 VITALS
DIASTOLIC BLOOD PRESSURE: 100 MMHG | OXYGEN SATURATION: 98 % | BODY MASS INDEX: 33.34 KG/M2 | TEMPERATURE: 98.1 F | HEART RATE: 94 BPM | WEIGHT: 220 LBS | RESPIRATION RATE: 16 BRPM | HEIGHT: 68 IN | SYSTOLIC BLOOD PRESSURE: 135 MMHG

## 2023-07-02 DIAGNOSIS — R10.12 ABDOMINAL PAIN, LEFT UPPER QUADRANT: ICD-10-CM

## 2023-07-02 DIAGNOSIS — K29.00 ACUTE GASTRITIS, PRESENCE OF BLEEDING UNSPECIFIED, UNSPECIFIED GASTRITIS TYPE: Primary | ICD-10-CM

## 2023-07-02 DIAGNOSIS — R07.9 CHEST PAIN, UNSPECIFIED TYPE: ICD-10-CM

## 2023-07-02 LAB
ALBUMIN SERPL-MCNC: 3.1 G/DL (ref 3.5–5)
ALBUMIN/GLOB SERPL: 0.8 (ref 0.4–1.6)
ALP SERPL-CCNC: 112 U/L (ref 50–136)
ALT SERPL-CCNC: 19 U/L (ref 12–65)
ANION GAP SERPL CALC-SCNC: 4 MMOL/L (ref 2–11)
AST SERPL-CCNC: 14 U/L (ref 15–37)
BASOPHILS # BLD: 0.1 K/UL (ref 0–0.2)
BASOPHILS NFR BLD: 1 % (ref 0–2)
BILIRUB SERPL-MCNC: 0.3 MG/DL (ref 0.2–1.1)
BILIRUB UR QL: NEGATIVE
BUN SERPL-MCNC: 15 MG/DL (ref 6–23)
CALCIUM SERPL-MCNC: 8.6 MG/DL (ref 8.3–10.4)
CHLORIDE SERPL-SCNC: 106 MMOL/L (ref 101–110)
CO2 SERPL-SCNC: 26 MMOL/L (ref 21–32)
CREAT SERPL-MCNC: 0.9 MG/DL (ref 0.6–1)
DIFFERENTIAL METHOD BLD: ABNORMAL
EKG ATRIAL RATE: 81 BPM
EKG DIAGNOSIS: NORMAL
EKG P AXIS: 81 DEGREES
EKG P-R INTERVAL: 186 MS
EKG Q-T INTERVAL: 368 MS
EKG QRS DURATION: 96 MS
EKG QTC CALCULATION (BAZETT): 427 MS
EKG R AXIS: 44 DEGREES
EKG T AXIS: 68 DEGREES
EKG VENTRICULAR RATE: 81 BPM
EOSINOPHIL # BLD: 0.2 K/UL (ref 0–0.8)
EOSINOPHIL NFR BLD: 3 % (ref 0.5–7.8)
ERYTHROCYTE [DISTWIDTH] IN BLOOD BY AUTOMATED COUNT: 16.6 % (ref 11.9–14.6)
GLOBULIN SER CALC-MCNC: 4.1 G/DL (ref 2.8–4.5)
GLUCOSE SERPL-MCNC: 88 MG/DL (ref 65–100)
GLUCOSE UR QL STRIP.AUTO: NEGATIVE MG/DL
HCT VFR BLD AUTO: 33.8 % (ref 35.8–46.3)
HGB BLD-MCNC: 10.5 G/DL (ref 11.7–15.4)
IMM GRANULOCYTES # BLD AUTO: 0 K/UL (ref 0–0.5)
IMM GRANULOCYTES NFR BLD AUTO: 0 % (ref 0–5)
KETONES UR-MCNC: NEGATIVE MG/DL
LEUKOCYTE ESTERASE UR QL STRIP: NEGATIVE
LIPASE SERPL-CCNC: 135 U/L (ref 73–393)
LYMPHOCYTES # BLD: 1.4 K/UL (ref 0.5–4.6)
LYMPHOCYTES NFR BLD: 22 % (ref 13–44)
MCH RBC QN AUTO: 26.5 PG (ref 26.1–32.9)
MCHC RBC AUTO-ENTMCNC: 31.1 G/DL (ref 31.4–35)
MCV RBC AUTO: 85.4 FL (ref 82–102)
MONOCYTES # BLD: 0.5 K/UL (ref 0.1–1.3)
MONOCYTES NFR BLD: 8 % (ref 4–12)
NEUTS SEG # BLD: 4.2 K/UL (ref 1.7–8.2)
NEUTS SEG NFR BLD: 66 % (ref 43–78)
NITRITE UR QL: NEGATIVE
NRBC # BLD: 0 K/UL (ref 0–0.2)
PH UR: 6 (ref 5–9)
PLATELET # BLD AUTO: 316 K/UL (ref 150–450)
PMV BLD AUTO: 9.8 FL (ref 9.4–12.3)
POTASSIUM SERPL-SCNC: 3.4 MMOL/L (ref 3.5–5.1)
PROT SERPL-MCNC: 7.2 G/DL (ref 6.3–8.2)
PROT UR QL: NEGATIVE MG/DL
RBC # BLD AUTO: 3.96 M/UL (ref 4.05–5.2)
RBC # UR STRIP: NEGATIVE
SERVICE CMNT-IMP: ABNORMAL
SODIUM SERPL-SCNC: 136 MMOL/L (ref 133–143)
SP GR UR: 1.02 (ref 1–1.02)
TROPONIN I SERPL HS-MCNC: 9 PG/ML (ref 0–14)
TROPONIN I SERPL HS-MCNC: 9.7 PG/ML (ref 0–14)
UROBILINOGEN UR QL: 1 EU/DL (ref 0.2–1)
WBC # BLD AUTO: 6.3 K/UL (ref 4.3–11.1)

## 2023-07-02 PROCEDURE — 6370000000 HC RX 637 (ALT 250 FOR IP)

## 2023-07-02 PROCEDURE — 6360000004 HC RX CONTRAST MEDICATION

## 2023-07-02 PROCEDURE — 96374 THER/PROPH/DIAG INJ IV PUSH: CPT

## 2023-07-02 PROCEDURE — 96375 TX/PRO/DX INJ NEW DRUG ADDON: CPT

## 2023-07-02 PROCEDURE — 83690 ASSAY OF LIPASE: CPT

## 2023-07-02 PROCEDURE — 6360000002 HC RX W HCPCS

## 2023-07-02 PROCEDURE — 99285 EMERGENCY DEPT VISIT HI MDM: CPT

## 2023-07-02 PROCEDURE — 80053 COMPREHEN METABOLIC PANEL: CPT

## 2023-07-02 PROCEDURE — 84484 ASSAY OF TROPONIN QUANT: CPT

## 2023-07-02 PROCEDURE — 81003 URINALYSIS AUTO W/O SCOPE: CPT

## 2023-07-02 PROCEDURE — 93005 ELECTROCARDIOGRAM TRACING: CPT

## 2023-07-02 PROCEDURE — 71260 CT THORAX DX C+: CPT

## 2023-07-02 PROCEDURE — 85025 COMPLETE CBC W/AUTO DIFF WBC: CPT

## 2023-07-02 RX ORDER — MAGNESIUM HYDROXIDE/ALUMINUM HYDROXICE/SIMETHICONE 120; 1200; 1200 MG/30ML; MG/30ML; MG/30ML
30 SUSPENSION ORAL
Status: COMPLETED | OUTPATIENT
Start: 2023-07-02 | End: 2023-07-02

## 2023-07-02 RX ORDER — OMEPRAZOLE 40 MG/1
40 CAPSULE, DELAYED RELEASE ORAL DAILY
Qty: 30 CAPSULE | Refills: 1 | Status: SHIPPED | OUTPATIENT
Start: 2023-07-02 | End: 2023-08-01

## 2023-07-02 RX ORDER — LIDOCAINE HYDROCHLORIDE 20 MG/ML
15 SOLUTION OROPHARYNGEAL
Status: COMPLETED | OUTPATIENT
Start: 2023-07-02 | End: 2023-07-02

## 2023-07-02 RX ORDER — ONDANSETRON 2 MG/ML
4 INJECTION INTRAMUSCULAR; INTRAVENOUS
Status: COMPLETED | OUTPATIENT
Start: 2023-07-02 | End: 2023-07-02

## 2023-07-02 RX ORDER — KETOROLAC TROMETHAMINE 30 MG/ML
30 INJECTION, SOLUTION INTRAMUSCULAR; INTRAVENOUS ONCE
Status: COMPLETED | OUTPATIENT
Start: 2023-07-02 | End: 2023-07-02

## 2023-07-02 RX ADMIN — KETOROLAC TROMETHAMINE 30 MG: 30 INJECTION, SOLUTION INTRAMUSCULAR; INTRAVENOUS at 13:15

## 2023-07-02 RX ADMIN — ALUMINUM HYDROXIDE, MAGNESIUM HYDROXIDE, AND SIMETHICONE 30 ML: 200; 200; 20 SUSPENSION ORAL at 16:47

## 2023-07-02 RX ADMIN — LIDOCAINE HYDROCHLORIDE 15 ML: 20 SOLUTION ORAL at 16:47

## 2023-07-02 RX ADMIN — IOPAMIDOL 100 ML: 755 INJECTION, SOLUTION INTRAVENOUS at 15:16

## 2023-07-02 RX ADMIN — ONDANSETRON 4 MG: 2 INJECTION INTRAMUSCULAR; INTRAVENOUS at 13:15

## 2023-07-02 ASSESSMENT — PAIN - FUNCTIONAL ASSESSMENT: PAIN_FUNCTIONAL_ASSESSMENT: 0-10

## 2023-07-02 ASSESSMENT — PAIN SCALES - GENERAL: PAINLEVEL_OUTOF10: 8

## 2023-08-31 ENCOUNTER — TELEPHONE (OUTPATIENT)
Dept: INTERNAL MEDICINE CLINIC | Facility: CLINIC | Age: 53
End: 2023-08-31

## 2023-08-31 DIAGNOSIS — M25.561 CHRONIC PAIN OF BOTH KNEES: Primary | ICD-10-CM

## 2023-08-31 DIAGNOSIS — G89.29 CHRONIC PAIN OF BOTH KNEES: Primary | ICD-10-CM

## 2023-08-31 DIAGNOSIS — M25.562 CHRONIC PAIN OF BOTH KNEES: Primary | ICD-10-CM

## 2023-08-31 DIAGNOSIS — F51.01 PRIMARY INSOMNIA: ICD-10-CM

## 2023-08-31 NOTE — TELEPHONE ENCOUNTER
Patient requesting refill on    RX-Ambien    RX-Omeprazole    RX-Alprazolam    Pharmacy-[facility] [address]     Patient scheduled for 9/5

## 2023-09-01 RX ORDER — ZOLPIDEM TARTRATE 10 MG/1
10 TABLET ORAL NIGHTLY PRN
Qty: 30 TABLET | Refills: 0 | Status: SHIPPED | OUTPATIENT
Start: 2023-09-01 | End: 2023-09-05 | Stop reason: SDUPTHER

## 2023-09-01 RX ORDER — OMEPRAZOLE 40 MG/1
40 CAPSULE, DELAYED RELEASE ORAL DAILY
Qty: 30 CAPSULE | Refills: 1 | Status: SHIPPED | OUTPATIENT
Start: 2023-09-01 | End: 2023-10-31

## 2023-09-01 NOTE — TELEPHONE ENCOUNTER
I do not rx alprazolam. Gets it from another doctor. Ill refill omeprazole and ambien. Needs ffup appt.  Needs labs prior to visit

## 2023-09-05 ENCOUNTER — OFFICE VISIT (OUTPATIENT)
Dept: INTERNAL MEDICINE CLINIC | Facility: CLINIC | Age: 53
End: 2023-09-05
Payer: COMMERCIAL

## 2023-09-05 VITALS
BODY MASS INDEX: 32.28 KG/M2 | HEIGHT: 68 IN | SYSTOLIC BLOOD PRESSURE: 170 MMHG | WEIGHT: 213 LBS | HEART RATE: 75 BPM | DIASTOLIC BLOOD PRESSURE: 100 MMHG

## 2023-09-05 DIAGNOSIS — I10 PRIMARY HYPERTENSION: ICD-10-CM

## 2023-09-05 DIAGNOSIS — R73.09 ELEVATED HEMOGLOBIN A1C: ICD-10-CM

## 2023-09-05 DIAGNOSIS — I26.99 BILATERAL PULMONARY EMBOLISM (HCC): ICD-10-CM

## 2023-09-05 DIAGNOSIS — E66.9 OBESITY (BMI 30.0-34.9): ICD-10-CM

## 2023-09-05 DIAGNOSIS — D64.9 CHRONIC ANEMIA: ICD-10-CM

## 2023-09-05 DIAGNOSIS — F51.01 PRIMARY INSOMNIA: Primary | ICD-10-CM

## 2023-09-05 DIAGNOSIS — E78.5 HYPERLIPIDEMIA, UNSPECIFIED HYPERLIPIDEMIA TYPE: ICD-10-CM

## 2023-09-05 PROCEDURE — 3077F SYST BP >= 140 MM HG: CPT | Performed by: FAMILY MEDICINE

## 2023-09-05 PROCEDURE — 3080F DIAST BP >= 90 MM HG: CPT | Performed by: FAMILY MEDICINE

## 2023-09-05 PROCEDURE — 99214 OFFICE O/P EST MOD 30 MIN: CPT | Performed by: FAMILY MEDICINE

## 2023-09-05 RX ORDER — TRAZODONE HYDROCHLORIDE 100 MG/1
TABLET ORAL
COMMUNITY
Start: 2023-08-25

## 2023-09-05 RX ORDER — HYDROCHLOROTHIAZIDE 25 MG/1
25 TABLET ORAL EVERY MORNING
Qty: 90 TABLET | Refills: 1 | Status: SHIPPED | OUTPATIENT
Start: 2023-09-05

## 2023-09-05 RX ORDER — ZOLPIDEM TARTRATE 10 MG/1
10 TABLET ORAL NIGHTLY PRN
Qty: 30 TABLET | Refills: 0 | Status: SHIPPED | OUTPATIENT
Start: 2023-09-05 | End: 2024-09-04

## 2023-09-05 NOTE — PROGRESS NOTES
Elzbieta Sandoval (:  1970) is a 52 y.o. female,Established patient, here for evaluation of the following chief complaint(s):  Follow-up (HTN- not taking HCTZ currently, stopped it a few months ago. ) and Insomnia (Follow up and refill Ambien. Pt also taking Trazodone given by Psychiatry. )         ASSESSMENT/PLAN:  1. Primary insomnia  -     zolpidem (AMBIEN) 10 MG tablet; Take 1 tablet by mouth nightly as needed for Sleep., Disp-30 tablet, R-0Normal  2. Chronic anemia  -     CBC with Auto Differential; Future  -     [address] [facility]  -     Iron; Future  -     Total Iron Binding Capacity; Future  -     Ferritin; Future  3. Obesity (BMI 30.0-34.9)  4. Primary hypertension  -     hydroCHLOROthiazide (HYDRODIURIL) 25 MG tablet; Take 1 tablet by mouth every morning, Disp-90 tablet, R-1Normal  -     CBC with Auto Differential; Future  -     Comprehensive Metabolic Panel; Future  -     Lipid Panel; Future  5. Elevated hemoglobin A1c  -     Comprehensive Metabolic Panel; Future  -     Hemoglobin A1C; Future  -     Urinalysis with Reflex to Culture; Future  6. Hyperlipidemia, unspecified hyperlipidemia type  -     Comprehensive Metabolic Panel; Future  -     Lipid Panel; Future  7. Bilateral pulmonary embolism ([address])  1. Insomnia. Doing well on Ambien. 2.  Chronic anemia. Will refer to gastroenterology for possible EGD. 3.  Obesity. Weight stable. 4.  Hypertension. Patient to follow-up but will make sure to take medications prior to coming in. Continue hydrochlorothiazide and Cardizem. 5.  Elevated hemoglobin A1c. Needs repeat check. 6.  Hyperlipidemia. Needs repeat levels. Next visit 7. Bilateral pulmonary embolism. Continue Eliquis. Return in about 3 weeks (around 2023) for ffup for awv with labs prior. Subjective   SUBJECTIVE/OBJECTIVE:  61-year-old female comes in for ffup. Medical illnesses include  1. DVT  2017.  Has mckinley filter which

## 2023-09-26 NOTE — PROGRESS NOTES
SUBJECTIVE:   52 y.o. female for annual routine Pap and checkup. 42-year-old  female comes in for cpe. Hysterectomy with bso   Medical illnesses include  1. DVT  . Has mckinley filter which was removed . Bilateral pulmonary embolism . Patient on Eliquis life long. 2.  Hypertension 2017. Patient on diltiazem. Not taking hctz. Did not take bp meds today so bp elevated. 3.  Asthma. Not on any medications. Occasional flare. 4.  Hyperlipidemia. No medications. Needs recheck of lipids. 5.  GERD. Patient on omeprazole. Takes daily. Patient did not show up for his GI appointment. Will refer again. 6.  History of bipolar disorder/schizophrenia. Patient on Seroquel,  Xanax. Seeing psychiatrist.  7.  Patient on chronic narcotics. Also on alprazolam and dextroamphetamine. For L knee arthritis. Patient seen pain management but in [place]. Patient will try to see someone in [place]. New referral done. 8.  Insomnia patient on Ambien. Doing well. 9. BMI 30. Lost 14 lbs. 10.  Impaired fasting glucose. Present hemoglobin A1c 5.7. Advised to decrease sugar and carb intake. Needs repeat hgba1c.   11.  Chronic anemia. Had normal colonoscopy. Denies abdominal pain. No menses. Never had egd. Will refer to gastroenterology for evaluation. 12.  Complains of pain cramping up. Will place on tizanidine  13.  Lab work done 22 hepatitis C negative CMP normal HDL 79  CBC with anemia hemoglobin A1c 5.8 TSH normal   Lab work done 2023 WBC 6.3 hemoglobin 10.5 GFR normal LFT normal urinalysis normal  Lab work done 2023 iron studies normal hemoglobin A1c 5.7 cholesterol 225 HDL 90   GFR 49 LFT normal hemoglobin 11.2 urinalysis not Urine drug screen positive for benzodiazepines and amphetamines urine culture shows skin yasmeen    Orders Only on 2023   Component Date Value Ref Range Status    Ferritin 2023 12  8 - 388 NG/ML Final    TIBC 2023 415  250 - 450

## 2023-09-27 DIAGNOSIS — E78.5 HYPERLIPIDEMIA, UNSPECIFIED HYPERLIPIDEMIA TYPE: ICD-10-CM

## 2023-09-27 DIAGNOSIS — I10 PRIMARY HYPERTENSION: ICD-10-CM

## 2023-09-27 DIAGNOSIS — M25.561 CHRONIC PAIN OF BOTH KNEES: ICD-10-CM

## 2023-09-27 DIAGNOSIS — R73.09 ELEVATED HEMOGLOBIN A1C: ICD-10-CM

## 2023-09-27 DIAGNOSIS — D64.9 CHRONIC ANEMIA: ICD-10-CM

## 2023-09-27 DIAGNOSIS — G89.29 CHRONIC PAIN OF BOTH KNEES: ICD-10-CM

## 2023-09-27 DIAGNOSIS — M25.562 CHRONIC PAIN OF BOTH KNEES: ICD-10-CM

## 2023-09-27 LAB
ALBUMIN SERPL-MCNC: 3.8 G/DL (ref 3.5–5)
ALBUMIN/GLOB SERPL: 1 (ref 0.4–1.6)
ALP SERPL-CCNC: 102 U/L (ref 50–136)
ALT SERPL-CCNC: 25 U/L (ref 12–65)
AMPHET UR QL SCN: POSITIVE
ANION GAP SERPL CALC-SCNC: 6 MMOL/L (ref 2–11)
APPEARANCE UR: CLEAR
AST SERPL-CCNC: 19 U/L (ref 15–37)
BACTERIA URNS QL MICRO: ABNORMAL /HPF
BARBITURATES UR QL SCN: NEGATIVE
BASOPHILS # BLD: 0.1 K/UL (ref 0–0.2)
BASOPHILS NFR BLD: 1 % (ref 0–2)
BENZODIAZ UR QL: POSITIVE
BILIRUB SERPL-MCNC: 0.5 MG/DL (ref 0.2–1.1)
BILIRUB UR QL: NEGATIVE
BUN SERPL-MCNC: 18 MG/DL (ref 6–23)
CALCIUM SERPL-MCNC: 9.5 MG/DL (ref 8.3–10.4)
CANNABINOIDS UR QL SCN: NEGATIVE
CASTS URNS QL MICRO: ABNORMAL /LPF (ref 0–2)
CHLORIDE SERPL-SCNC: 106 MMOL/L (ref 101–110)
CHOLEST SERPL-MCNC: 225 MG/DL
CO2 SERPL-SCNC: 28 MMOL/L (ref 21–32)
COCAINE UR QL SCN: NEGATIVE
COLOR UR: ABNORMAL
CREAT SERPL-MCNC: 1.3 MG/DL (ref 0.6–1)
DIFFERENTIAL METHOD BLD: ABNORMAL
EOSINOPHIL # BLD: 0.2 K/UL (ref 0–0.8)
EOSINOPHIL NFR BLD: 3 % (ref 0.5–7.8)
EPI CELLS #/AREA URNS HPF: ABNORMAL /HPF (ref 0–5)
ERYTHROCYTE [DISTWIDTH] IN BLOOD BY AUTOMATED COUNT: 16.4 % (ref 11.9–14.6)
FERRITIN SERPL-MCNC: 12 NG/ML (ref 8–388)
GLOBULIN SER CALC-MCNC: 4 G/DL (ref 2.8–4.5)
GLUCOSE SERPL-MCNC: 84 MG/DL (ref 65–100)
GLUCOSE UR STRIP.AUTO-MCNC: NEGATIVE MG/DL
HCT VFR BLD AUTO: 35.8 % (ref 35.8–46.3)
HDLC SERPL-MCNC: 95 MG/DL (ref 40–60)
HDLC SERPL: 2.4
HGB BLD-MCNC: 11.2 G/DL (ref 11.7–15.4)
HGB UR QL STRIP: NEGATIVE
IMM GRANULOCYTES # BLD AUTO: 0 K/UL (ref 0–0.5)
IMM GRANULOCYTES NFR BLD AUTO: 0 % (ref 0–5)
IRON SERPL-MCNC: 53 UG/DL (ref 35–150)
KETONES UR QL STRIP.AUTO: ABNORMAL MG/DL
LDLC SERPL CALC-MCNC: 117 MG/DL
LEUKOCYTE ESTERASE UR QL STRIP.AUTO: ABNORMAL
LYMPHOCYTES # BLD: 2.4 K/UL (ref 0.5–4.6)
LYMPHOCYTES NFR BLD: 47 % (ref 13–44)
MCH RBC QN AUTO: 27 PG (ref 26.1–32.9)
MCHC RBC AUTO-ENTMCNC: 31.3 G/DL (ref 31.4–35)
MCV RBC AUTO: 86.3 FL (ref 82–102)
METHADONE UR QL: NEGATIVE
MONOCYTES # BLD: 0.4 K/UL (ref 0.1–1.3)
MONOCYTES NFR BLD: 9 % (ref 4–12)
MUCOUS THREADS URNS QL MICRO: 0 /LPF
NEUTS SEG # BLD: 2 K/UL (ref 1.7–8.2)
NEUTS SEG NFR BLD: 40 % (ref 43–78)
NITRITE UR QL STRIP.AUTO: NEGATIVE
NRBC # BLD: 0 K/UL (ref 0–0.2)
OPIATES UR QL: NEGATIVE
PCP UR QL: NEGATIVE
PH UR STRIP: 5.5 (ref 5–9)
PLATELET # BLD AUTO: 331 K/UL (ref 150–450)
PMV BLD AUTO: 11 FL (ref 9.4–12.3)
POTASSIUM SERPL-SCNC: 3.6 MMOL/L (ref 3.5–5.1)
PROT SERPL-MCNC: 7.8 G/DL (ref 6.3–8.2)
PROT UR STRIP-MCNC: NEGATIVE MG/DL
RBC # BLD AUTO: 4.15 M/UL (ref 4.05–5.2)
RBC #/AREA URNS HPF: ABNORMAL /HPF (ref 0–5)
SODIUM SERPL-SCNC: 140 MMOL/L (ref 133–143)
SP GR UR REFRACTOMETRY: 1.02 (ref 1–1.02)
TIBC SERPL-MCNC: 415 UG/DL (ref 250–450)
TRIGL SERPL-MCNC: 65 MG/DL (ref 35–150)
URINE CULTURE IF INDICATED: ABNORMAL
UROBILINOGEN UR QL STRIP.AUTO: 1 EU/DL (ref 0.2–1)
VLDLC SERPL CALC-MCNC: 13 MG/DL (ref 6–23)
WBC # BLD AUTO: 5.1 K/UL (ref 4.3–11.1)
WBC URNS QL MICRO: ABNORMAL /HPF (ref 0–4)

## 2023-09-28 LAB
EST. AVERAGE GLUCOSE BLD GHB EST-MCNC: 117 MG/DL
HBA1C MFR BLD: 5.7 % (ref 4.8–5.6)

## 2023-09-29 LAB
BACTERIA SPEC CULT: NORMAL
BACTERIA SPEC CULT: NORMAL
SERVICE CMNT-IMP: NORMAL

## 2023-10-04 ENCOUNTER — OFFICE VISIT (OUTPATIENT)
Dept: INTERNAL MEDICINE CLINIC | Facility: CLINIC | Age: 53
End: 2023-10-04
Payer: COMMERCIAL

## 2023-10-04 VITALS
BODY MASS INDEX: 30.28 KG/M2 | DIASTOLIC BLOOD PRESSURE: 100 MMHG | HEIGHT: 68 IN | WEIGHT: 199.8 LBS | SYSTOLIC BLOOD PRESSURE: 150 MMHG | HEART RATE: 87 BPM

## 2023-10-04 DIAGNOSIS — Z12.31 ENCOUNTER FOR SCREENING MAMMOGRAM FOR MALIGNANT NEOPLASM OF BREAST: ICD-10-CM

## 2023-10-04 DIAGNOSIS — N18.31 STAGE 3A CHRONIC KIDNEY DISEASE (HCC): ICD-10-CM

## 2023-10-04 DIAGNOSIS — K21.9 GASTROESOPHAGEAL REFLUX DISEASE WITHOUT ESOPHAGITIS: ICD-10-CM

## 2023-10-04 DIAGNOSIS — I10 PRIMARY HYPERTENSION: ICD-10-CM

## 2023-10-04 DIAGNOSIS — R25.2 CRAMPING OF HANDS: ICD-10-CM

## 2023-10-04 DIAGNOSIS — Z00.00 ANNUAL PHYSICAL EXAM: Primary | ICD-10-CM

## 2023-10-04 DIAGNOSIS — G89.29 CHRONIC PAIN OF LEFT KNEE: ICD-10-CM

## 2023-10-04 DIAGNOSIS — E66.9 OBESITY (BMI 30.0-34.9): ICD-10-CM

## 2023-10-04 DIAGNOSIS — I26.99 BILATERAL PULMONARY EMBOLISM (HCC): ICD-10-CM

## 2023-10-04 DIAGNOSIS — D64.9 CHRONIC ANEMIA: ICD-10-CM

## 2023-10-04 DIAGNOSIS — G89.29 CHRONIC BILATERAL LOW BACK PAIN WITH RIGHT-SIDED SCIATICA: ICD-10-CM

## 2023-10-04 DIAGNOSIS — Z23 FLU VACCINE NEED: ICD-10-CM

## 2023-10-04 DIAGNOSIS — M25.562 CHRONIC PAIN OF LEFT KNEE: ICD-10-CM

## 2023-10-04 DIAGNOSIS — E78.5 HYPERLIPIDEMIA, UNSPECIFIED HYPERLIPIDEMIA TYPE: ICD-10-CM

## 2023-10-04 DIAGNOSIS — M54.41 CHRONIC BILATERAL LOW BACK PAIN WITH RIGHT-SIDED SCIATICA: ICD-10-CM

## 2023-10-04 PROCEDURE — 90674 CCIIV4 VAC NO PRSV 0.5 ML IM: CPT | Performed by: FAMILY MEDICINE

## 2023-10-04 PROCEDURE — 90471 IMMUNIZATION ADMIN: CPT | Performed by: FAMILY MEDICINE

## 2023-10-04 PROCEDURE — 99396 PREV VISIT EST AGE 40-64: CPT | Performed by: FAMILY MEDICINE

## 2023-10-04 PROCEDURE — 3077F SYST BP >= 140 MM HG: CPT | Performed by: FAMILY MEDICINE

## 2023-10-04 PROCEDURE — 3080F DIAST BP >= 90 MM HG: CPT | Performed by: FAMILY MEDICINE

## 2023-10-04 RX ORDER — FLUOXETINE HYDROCHLORIDE 40 MG/1
40 CAPSULE ORAL DAILY
COMMUNITY
Start: 2023-09-25

## 2023-10-04 RX ORDER — DILTIAZEM HYDROCHLORIDE 240 MG/1
240 CAPSULE, EXTENDED RELEASE ORAL DAILY
Qty: 90 CAPSULE | Refills: 1 | Status: SHIPPED | OUTPATIENT
Start: 2023-10-04

## 2023-10-04 RX ORDER — TIZANIDINE 4 MG/1
4 TABLET ORAL EVERY 8 HOURS PRN
Qty: 30 TABLET | Refills: 2 | Status: SHIPPED | OUTPATIENT
Start: 2023-10-04

## 2023-11-06 DIAGNOSIS — F51.01 PRIMARY INSOMNIA: ICD-10-CM

## 2023-11-06 RX ORDER — ZOLPIDEM TARTRATE 10 MG/1
10 TABLET ORAL NIGHTLY PRN
Qty: 30 TABLET | Refills: 0 | Status: SHIPPED | OUTPATIENT
Start: 2023-11-06 | End: 2024-11-05

## 2023-11-06 RX ORDER — OMEPRAZOLE 40 MG/1
40 CAPSULE, DELAYED RELEASE ORAL DAILY
Qty: 30 CAPSULE | Refills: 0 | Status: SHIPPED | OUTPATIENT
Start: 2023-11-06 | End: 2024-01-05

## 2023-12-07 ENCOUNTER — OFFICE VISIT (OUTPATIENT)
Dept: INTERNAL MEDICINE CLINIC | Facility: CLINIC | Age: 53
End: 2023-12-07
Payer: COMMERCIAL

## 2023-12-07 VITALS
OXYGEN SATURATION: 99 % | HEIGHT: 68 IN | SYSTOLIC BLOOD PRESSURE: 130 MMHG | BODY MASS INDEX: 29.28 KG/M2 | DIASTOLIC BLOOD PRESSURE: 85 MMHG | WEIGHT: 193.2 LBS | HEART RATE: 90 BPM

## 2023-12-07 DIAGNOSIS — R73.09 ELEVATED HEMOGLOBIN A1C: ICD-10-CM

## 2023-12-07 DIAGNOSIS — F51.01 PRIMARY INSOMNIA: ICD-10-CM

## 2023-12-07 DIAGNOSIS — E66.9 OBESITY (BMI 30.0-34.9): ICD-10-CM

## 2023-12-07 DIAGNOSIS — N18.31 STAGE 3A CHRONIC KIDNEY DISEASE (HCC): ICD-10-CM

## 2023-12-07 DIAGNOSIS — I10 PRIMARY HYPERTENSION: Primary | ICD-10-CM

## 2023-12-07 DIAGNOSIS — E78.5 HYPERLIPIDEMIA, UNSPECIFIED HYPERLIPIDEMIA TYPE: ICD-10-CM

## 2023-12-07 DIAGNOSIS — K21.9 GASTROESOPHAGEAL REFLUX DISEASE WITHOUT ESOPHAGITIS: ICD-10-CM

## 2023-12-07 PROCEDURE — 3075F SYST BP GE 130 - 139MM HG: CPT | Performed by: FAMILY MEDICINE

## 2023-12-07 PROCEDURE — 3079F DIAST BP 80-89 MM HG: CPT | Performed by: FAMILY MEDICINE

## 2023-12-07 PROCEDURE — 99214 OFFICE O/P EST MOD 30 MIN: CPT | Performed by: FAMILY MEDICINE

## 2023-12-07 RX ORDER — ZOLPIDEM TARTRATE 10 MG/1
10 TABLET ORAL NIGHTLY PRN
Qty: 30 TABLET | Refills: 0 | Status: SHIPPED | OUTPATIENT
Start: 2023-12-07 | End: 2024-12-06

## 2023-12-07 RX ORDER — OMEPRAZOLE 40 MG/1
40 CAPSULE, DELAYED RELEASE ORAL DAILY
Qty: 90 CAPSULE | Refills: 1 | Status: SHIPPED | OUTPATIENT
Start: 2023-12-07 | End: 2024-06-04

## 2023-12-07 NOTE — PROGRESS NOTES
on any medications. Occasional flare. 4.  Hyperlipidemia. No medications. Needs recheck of lipids. 5.  GERD. Patient on omeprazole. Takes daily. Patient did not show up for his GI appointment. Will refer again. 6.  History of bipolar disorder/schizophrenia. Patient on Seroquel,  Xanax. Seeing psychiatrist.  7.  Patient on chronic narcotics. Also on alprazolam and dextroamphetamine. For L knee arthritis. Patient seen pain management but in [place]. Patient will try to see someone in [place]. New referral done. 8.  Insomnia patient on Ambien. Doing well. 9. BMI 30. Lost 6 more pounds. 10.  Impaired fasting glucose. Present hemoglobin A1c 5.7. Advised to decrease sugar and carb intake. Needs repeat hgba1c.   11.  Chronic anemia. Had normal colonoscopy. Denies abdominal pain. No menses. Never had egd. Will refer to gastroenterology for evaluation. 12.  Complains of pain cramping up. Will place on tizanidine  13. Lab work done 11/23/22 hepatitis C negative CMP normal HDL 79  CBC with anemia hemoglobin A1c 5.8 TSH normal   Lab work done 7/2/2023 WBC 6.3 hemoglobin 10.5 GFR normal LFT normal urinalysis normal  Lab work done 9/27/2023 iron studies normal hemoglobin A1c 5.7 cholesterol 225 HDL 90   GFR 49 LFT normal hemoglobin 11.2 urinalysis not Urine drug screen positive for benzodiazepines and amphetamines urine culture shows skin yasmeen        Review of Systems       Objective   Physical Exam  Constitutional:       Appearance: Normal appearance. HENT:      Head: Normocephalic. Cardiovascular:      Rate and Rhythm: Normal rate and regular rhythm. Pulmonary:      Effort: Pulmonary effort is normal.      Breath sounds: Normal breath sounds. Neurological:      General: No focal deficit present. Mental Status: She is alert. An electronic signature was used to authenticate this note.     --Monisha Heath MD

## 2024-01-08 DIAGNOSIS — F51.01 PRIMARY INSOMNIA: ICD-10-CM

## 2024-01-08 RX ORDER — ZOLPIDEM TARTRATE 10 MG/1
10 TABLET ORAL NIGHTLY PRN
Qty: 30 TABLET | Refills: 0 | Status: SHIPPED | OUTPATIENT
Start: 2024-01-08 | End: 2025-01-07

## 2024-01-08 NOTE — TELEPHONE ENCOUNTER
Patient requesting refill on     [facility]

## 2024-01-08 NOTE — TELEPHONE ENCOUNTER
I have reviewed the patient’s controlled substance prescription history, as maintained in the [place] prescription monitoring program, so that the prescription(s) for a  controlled substance can be given.

## 2024-01-31 ENCOUNTER — HOSPITAL ENCOUNTER (EMERGENCY)
Age: 54
Discharge: HOME | End: 2024-01-31
Payer: OTHER MISCELLANEOUS

## 2024-01-31 ENCOUNTER — APPOINTMENT (OUTPATIENT)
Dept: GENERAL RADIOLOGY | Age: 54
End: 2024-01-31
Payer: OTHER MISCELLANEOUS

## 2024-01-31 VITALS
RESPIRATION RATE: 16 BRPM | SYSTOLIC BLOOD PRESSURE: 166 MMHG | HEART RATE: 102 BPM | TEMPERATURE: 98.1 F | OXYGEN SATURATION: 99 % | DIASTOLIC BLOOD PRESSURE: 118 MMHG
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
## 2024-01-31 DIAGNOSIS — S39.012A STRAIN OF LUMBAR REGION, INITIAL ENCOUNTER: ICD-10-CM

## 2024-01-31 DIAGNOSIS — S16.1XXA STRAIN OF NECK MUSCLE, INITIAL ENCOUNTER: ICD-10-CM

## 2024-01-31 PROCEDURE — 99283 EMERGENCY DEPT VISIT LOW MDM: CPT

## 2024-01-31 PROCEDURE — 72100 X-RAY EXAM L-S SPINE 2/3 VWS: CPT

## 2024-01-31 PROCEDURE — 72040 X-RAY EXAM NECK SPINE 2-3 VW: CPT

## 2024-01-31 PROCEDURE — 6370000000 HC RX 637 (ALT 250 FOR IP): Performed by: STUDENT IN AN ORGANIZED HEALTH CARE EDUCATION/TRAINING PROGRAM

## 2024-01-31 RX ORDER — LIDOCAINE 50 MG/G
1 PATCH TOPICAL DAILY
Qty: 10 PATCH | Refills: 1 | Status: SHIPPED | OUTPATIENT
Start: 2024-01-31 | End: 2024-02-10

## 2024-01-31 RX ORDER — LORAZEPAM 1 MG/1
1 TABLET ORAL ONCE
Status: COMPLETED | OUTPATIENT
Start: 2024-01-31 | End: 2024-01-31

## 2024-01-31 RX ORDER — MELOXICAM 15 MG/1
15 TABLET ORAL DAILY
Qty: 30 TABLET | Refills: 0 | Status: SHIPPED | OUTPATIENT
Start: 2024-01-31 | End: 2024-03-01

## 2024-01-31 RX ORDER — CHLORZOXAZONE 500 MG/1
500 TABLET ORAL 3 TIMES DAILY PRN
Qty: 30 TABLET | Refills: 0 | Status: SHIPPED | OUTPATIENT
Start: 2024-01-31 | End: 2024-02-10

## 2024-01-31 RX ORDER — MELOXICAM 7.5 MG/1
15 TABLET ORAL ONCE
Status: COMPLETED | OUTPATIENT
Start: 2024-01-31 | End: 2024-01-31

## 2024-01-31 RX ADMIN — LORAZEPAM 1 MG: 1 TABLET ORAL at 11:52

## 2024-01-31 RX ADMIN — MELOXICAM 15 MG: 7.5 TABLET ORAL at 12:56

## 2024-01-31 ASSESSMENT — ENCOUNTER SYMPTOMS
PHOTOPHOBIA: 0
COUGH: 0
ABDOMINAL PAIN: 0
TROUBLE SWALLOWING: 0
FACIAL SWELLING: 0
BACK PAIN: 1
SHORTNESS OF BREATH: 0
VOMITING: 0

## 2024-01-31 ASSESSMENT — PAIN SCALES - GENERAL: PAINLEVEL_OUTOF10: 0

## 2024-01-31 ASSESSMENT — LIFESTYLE VARIABLES
HOW OFTEN DO YOU HAVE A DRINK CONTAINING ALCOHOL: 2-4 TIMES A MONTH
HOW MANY STANDARD DRINKS CONTAINING ALCOHOL DO YOU HAVE ON A TYPICAL DAY: 1 OR 2

## 2024-01-31 ASSESSMENT — VISUAL ACUITY: OU: 1

## 2024-01-31 NOTE — ED NOTES
I have reviewed discharge instructions with the patient.  The patient verbalized understanding.    Patient left ED via Discharge Method: ambulatory to Home with self.    Opportunity for questions and clarification provided.       Patient given 3 scripts.         To continue your aftercare when you leave the hospital, you may receive an automated call from our care team to check in on how you are doing.  This is a free service and part of our promise to provide the best care and service to meet your aftercare needs.” If you have questions, or wish to unsubscribe from this service please call [phone].  Thank you for Choosing our [facility] Emergency Department.        Huong Palacios LPN  01/31/24 [id number]
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
## 2024-01-31 NOTE — DISCHARGE INSTRUCTIONS
Your x-rays did not show any acute broken bones.  I suspect you have strained your muscles.  Return here for new or worsening symptoms.    As we discussed, I did not find a life threatening cause of your symptoms today. However, THAT DOES NOT MEAN IT COULD NOT DEVELOP. If you develop ANY new or worsening symptoms, it is critical that you return for re-evaluation. This includes any symptoms that are concerning to you, especially symptoms such as fevers, vomiting, numbness, chest pain, abdominal pain.  If you do not return for re-evaluation, you risk serious complications, including death.

## 2024-01-31 NOTE — ED NOTES
Pt's D/C BP of 166/118 reported to DASHA Key. No further orders.      Huong Palacios LPN  01/31/24 1300

## 2024-02-26 DIAGNOSIS — F51.01 PRIMARY INSOMNIA: ICD-10-CM

## 2024-02-26 NOTE — TELEPHONE ENCOUNTER
Pt requesting refill on     Rx-[facility]    Pharmacy-[facility] [address]

## 2024-02-27 NOTE — TELEPHONE ENCOUNTER
Patient was last seen in Dec. Next follow up is in April Medication was last filled in Jan for 30 days with 0 refills

## 2024-03-02 RX ORDER — ZOLPIDEM TARTRATE 10 MG/1
10 TABLET ORAL NIGHTLY PRN
Qty: 15 TABLET | Refills: 0 | Status: SHIPPED | OUTPATIENT
Start: 2024-03-02 | End: 2024-03-17

## 2024-04-29 DIAGNOSIS — F51.01 PRIMARY INSOMNIA: Primary | ICD-10-CM

## 2024-04-29 RX ORDER — ZOLPIDEM TARTRATE 10 MG/1
10 TABLET ORAL NIGHTLY PRN
Qty: 30 TABLET | Refills: 0 | Status: SHIPPED | OUTPATIENT
Start: 2024-04-29 | End: 2024-05-29

## 2024-04-29 NOTE — TELEPHONE ENCOUNTER
----- Message from Bharati Marroquin sent at 4/29/2024 11:22 AM EDT -----  Subject: Refill Request    QUESTIONS  Name of Medication? zolpidem (AMBIEN) 10 MG tablet  Patient-reported dosage and instructions? 10 mg take as needed for sleep  How many days do you have left? 0  Preferred Pharmacy? [facility] [place] [id number]  Pharmacy phone number (if available)? [phone]  ---------------------------------------------------------------------------  --------------  CALL BACK INFO  What is the best way for the office to contact you? OK to leave message on   voicemail  Preferred Call Back Phone Number? [phone]  ---------------------------------------------------------------------------  --------------  SCRIPT ANSWERS  Relationship to Patient? Self

## 2024-05-18 ENCOUNTER — APPOINTMENT (OUTPATIENT)
Dept: GENERAL RADIOLOGY | Age: 54
End: 2024-05-18
Payer: COMMERCIAL

## 2024-05-18 ENCOUNTER — APPOINTMENT (OUTPATIENT)
Dept: CT IMAGING | Age: 54
End: 2024-05-18
Payer: COMMERCIAL

## 2024-05-18 ENCOUNTER — HOSPITAL ENCOUNTER (EMERGENCY)
Age: 54
Discharge: HOME | End: 2024-05-18
Attending: EMERGENCY MEDICINE
Payer: COMMERCIAL

## 2024-05-18 VITALS
OXYGEN SATURATION: 100 % | RESPIRATION RATE: 17 BRPM | SYSTOLIC BLOOD PRESSURE: 181 MMHG | BODY MASS INDEX: 26.66 KG/M2 | HEART RATE: 75 BPM | HEIGHT: 69 IN | DIASTOLIC BLOOD PRESSURE: 103 MMHG | WEIGHT: 180 LBS | TEMPERATURE: 98.7 F

## 2024-05-18 DIAGNOSIS — N39.0 URINARY TRACT INFECTION WITHOUT HEMATURIA, SITE UNSPECIFIED: Primary | ICD-10-CM

## 2024-05-18 DIAGNOSIS — E87.6 HYPOKALEMIA: ICD-10-CM

## 2024-05-18 DIAGNOSIS — M54.12 CERVICAL RADICULOPATHY: ICD-10-CM

## 2024-05-18 DIAGNOSIS — M50.30 DDD (DEGENERATIVE DISC DISEASE), CERVICAL: ICD-10-CM

## 2024-05-18 DIAGNOSIS — M62.838 CERVICAL PARASPINAL MUSCLE SPASM: ICD-10-CM

## 2024-05-18 LAB
ALBUMIN SERPL-MCNC: 3.6 G/DL (ref 3.5–5)
ALBUMIN/GLOB SERPL: 1 (ref 1–1.9)
ALP SERPL-CCNC: 95 U/L (ref 35–104)
ALT SERPL-CCNC: 12 U/L (ref 12–65)
ANION GAP SERPL CALC-SCNC: 14 MMOL/L (ref 9–18)
APPEARANCE UR: ABNORMAL
AST SERPL-CCNC: 25 U/L (ref 15–37)
BACTERIA URNS QL MICRO: ABNORMAL /HPF
BASOPHILS # BLD: 0.1 K/UL (ref 0–0.2)
BASOPHILS NFR BLD: 1 % (ref 0–2)
BILIRUB SERPL-MCNC: <0.2 MG/DL (ref 0–1.2)
BILIRUB UR QL: NEGATIVE
BUN SERPL-MCNC: 10 MG/DL (ref 6–23)
CALCIUM SERPL-MCNC: 8.8 MG/DL (ref 8.8–10.2)
CASTS URNS QL MICRO: ABNORMAL /LPF
CHLORIDE SERPL-SCNC: 109 MMOL/L (ref 98–107)
CO2 SERPL-SCNC: 21 MMOL/L (ref 20–28)
COLOR UR: ABNORMAL
CREAT SERPL-MCNC: 0.77 MG/DL (ref 0.6–1.1)
DIFFERENTIAL METHOD BLD: ABNORMAL
EOSINOPHIL # BLD: 0.1 K/UL (ref 0–0.8)
EOSINOPHIL NFR BLD: 3 % (ref 0.5–7.8)
EPI CELLS #/AREA URNS HPF: ABNORMAL /HPF
ERYTHROCYTE [DISTWIDTH] IN BLOOD BY AUTOMATED COUNT: 15.4 % (ref 11.9–14.6)
GLOBULIN SER CALC-MCNC: 3.6 G/DL (ref 2.3–3.5)
GLUCOSE SERPL-MCNC: 100 MG/DL (ref 70–99)
GLUCOSE UR STRIP.AUTO-MCNC: NEGATIVE MG/DL
HCT VFR BLD AUTO: 37.1 % (ref 35.8–46.3)
HGB BLD-MCNC: 12.3 G/DL (ref 11.7–15.4)
HGB UR QL STRIP: NEGATIVE
IMM GRANULOCYTES # BLD AUTO: 0 K/UL (ref 0–0.5)
IMM GRANULOCYTES NFR BLD AUTO: 0 % (ref 0–5)
INR PPP: 1
KETONES UR QL STRIP.AUTO: ABNORMAL MG/DL
LEUKOCYTE ESTERASE UR QL STRIP.AUTO: NEGATIVE
LYMPHOCYTES # BLD: 1.4 K/UL (ref 0.5–4.6)
LYMPHOCYTES NFR BLD: 26 % (ref 13–44)
MCH RBC QN AUTO: 28.8 PG (ref 26.1–32.9)
MCHC RBC AUTO-ENTMCNC: 33.2 G/DL (ref 31.4–35)
MCV RBC AUTO: 86.9 FL (ref 82–102)
MONOCYTES # BLD: 0.3 K/UL (ref 0.1–1.3)
MONOCYTES NFR BLD: 5 % (ref 4–12)
MUCOUS THREADS URNS QL MICRO: ABNORMAL /LPF
NEUTS SEG # BLD: 3.5 K/UL (ref 1.7–8.2)
NEUTS SEG NFR BLD: 65 % (ref 43–78)
NITRITE UR QL STRIP.AUTO: NEGATIVE
NRBC # BLD: 0 K/UL (ref 0–0.2)
OTHER OBSERVATIONS: ABNORMAL
PH UR STRIP: 5.5 (ref 5–9)
PLATELET # BLD AUTO: 298 K/UL (ref 150–450)
PMV BLD AUTO: 9.8 FL (ref 9.4–12.3)
POTASSIUM SERPL-SCNC: 3.2 MMOL/L (ref 3.5–5.1)
PROT SERPL-MCNC: 7.2 G/DL (ref 6.3–8.2)
PROT UR STRIP-MCNC: 30 MG/DL
PROTHROMBIN TIME: 13.4 SEC (ref 11.3–14.9)
RBC # BLD AUTO: 4.27 M/UL (ref 4.05–5.2)
RBC #/AREA URNS HPF: ABNORMAL /HPF
SODIUM SERPL-SCNC: 144 MMOL/L (ref 136–145)
SP GR UR REFRACTOMETRY: >1.035 (ref 1–1.02)
TROPONIN T SERPL HS-MCNC: 10 NG/L (ref 0–14)
TROPONIN T SERPL HS-MCNC: 9 NG/L (ref 0–14)
UROBILINOGEN UR QL STRIP.AUTO: 1 EU/DL (ref 0.2–1)
WBC # BLD AUTO: 5.3 K/UL (ref 4.3–11.1)
WBC URNS QL MICRO: ABNORMAL /HPF

## 2024-05-18 PROCEDURE — 84484 ASSAY OF TROPONIN QUANT: CPT

## 2024-05-18 PROCEDURE — 96365 THER/PROPH/DIAG IV INF INIT: CPT

## 2024-05-18 PROCEDURE — 2580000003 HC RX 258

## 2024-05-18 PROCEDURE — 85025 COMPLETE CBC W/AUTO DIFF WBC: CPT

## 2024-05-18 PROCEDURE — 72125 CT NECK SPINE W/O DYE: CPT

## 2024-05-18 PROCEDURE — 70498 CT ANGIOGRAPHY NECK: CPT

## 2024-05-18 PROCEDURE — 6360000002 HC RX W HCPCS: Performed by: PHYSICIAN ASSISTANT

## 2024-05-18 PROCEDURE — 87086 URINE CULTURE/COLONY COUNT: CPT

## 2024-05-18 PROCEDURE — 70450 CT HEAD/BRAIN W/O DYE: CPT

## 2024-05-18 PROCEDURE — 2580000003 HC RX 258: Performed by: PHYSICIAN ASSISTANT

## 2024-05-18 PROCEDURE — 96361 HYDRATE IV INFUSION ADD-ON: CPT

## 2024-05-18 PROCEDURE — 6370000000 HC RX 637 (ALT 250 FOR IP): Performed by: PHYSICIAN ASSISTANT

## 2024-05-18 PROCEDURE — 85610 PROTHROMBIN TIME: CPT

## 2024-05-18 PROCEDURE — 73030 X-RAY EXAM OF SHOULDER: CPT

## 2024-05-18 PROCEDURE — 99285 EMERGENCY DEPT VISIT HI MDM: CPT

## 2024-05-18 PROCEDURE — 6360000004 HC RX CONTRAST MEDICATION: Performed by: PHYSICIAN ASSISTANT

## 2024-05-18 PROCEDURE — 80053 COMPREHEN METABOLIC PANEL: CPT

## 2024-05-18 PROCEDURE — 93005 ELECTROCARDIOGRAM TRACING: CPT | Performed by: PHYSICIAN ASSISTANT

## 2024-05-18 PROCEDURE — 81001 URINALYSIS AUTO W/SCOPE: CPT

## 2024-05-18 PROCEDURE — 96375 TX/PRO/DX INJ NEW DRUG ADDON: CPT

## 2024-05-18 RX ORDER — CEFDINIR 300 MG/1
300 CAPSULE ORAL 2 TIMES DAILY
Qty: 20 CAPSULE | Refills: 0 | Status: SHIPPED | OUTPATIENT
Start: 2024-05-18 | End: 2024-05-21 | Stop reason: ALTCHOICE

## 2024-05-18 RX ORDER — ONDANSETRON 2 MG/ML
4 INJECTION INTRAMUSCULAR; INTRAVENOUS
Status: COMPLETED | OUTPATIENT
Start: 2024-05-18 | End: 2024-05-18

## 2024-05-18 RX ORDER — LIDOCAINE 4 G/G
2 PATCH TOPICAL
Status: DISCONTINUED | OUTPATIENT
Start: 2024-05-18 | End: 2024-05-18 | Stop reason: HOSPADM

## 2024-05-18 RX ORDER — LIDOCAINE 50 MG/G
1 PATCH TOPICAL DAILY
Qty: 30 PATCH | Refills: 0 | Status: SHIPPED | OUTPATIENT
Start: 2024-05-18 | End: 2024-05-21

## 2024-05-18 RX ORDER — POTASSIUM CHLORIDE 7.45 MG/ML
10 INJECTION INTRAVENOUS ONCE
Status: COMPLETED | OUTPATIENT
Start: 2024-05-18 | End: 2024-05-18

## 2024-05-18 RX ORDER — DICLOFENAC POTASSIUM 50 MG/1
50 TABLET, FILM COATED ORAL 3 TIMES DAILY PRN
Qty: 30 TABLET | Refills: 0 | Status: SHIPPED | OUTPATIENT
Start: 2024-05-18

## 2024-05-18 RX ORDER — 0.9 % SODIUM CHLORIDE 0.9 %
500 INTRAVENOUS SOLUTION INTRAVENOUS ONCE
Status: COMPLETED | OUTPATIENT
Start: 2024-05-18 | End: 2024-05-18

## 2024-05-18 RX ORDER — METHOCARBAMOL 500 MG/1
500 TABLET, FILM COATED ORAL 3 TIMES DAILY PRN
Qty: 30 TABLET | Refills: 0 | Status: SHIPPED | OUTPATIENT
Start: 2024-05-18 | End: 2024-05-28

## 2024-05-18 RX ORDER — MORPHINE SULFATE 2 MG/ML
2 INJECTION, SOLUTION INTRAMUSCULAR; INTRAVENOUS
Status: COMPLETED | OUTPATIENT
Start: 2024-05-18 | End: 2024-05-18

## 2024-05-18 RX ADMIN — SODIUM CHLORIDE 500 ML: 9 INJECTION, SOLUTION INTRAVENOUS at 19:36

## 2024-05-18 RX ADMIN — IOPAMIDOL 100 ML: 755 INJECTION, SOLUTION INTRAVENOUS at 18:01

## 2024-05-18 RX ADMIN — MORPHINE SULFATE 2 MG: 2 INJECTION, SOLUTION INTRAMUSCULAR; INTRAVENOUS at 18:25

## 2024-05-18 RX ADMIN — POTASSIUM CHLORIDE 10 MEQ: 7.46 INJECTION, SOLUTION INTRAVENOUS at 18:31

## 2024-05-18 RX ADMIN — WATER 1000 MG: 1 INJECTION INTRAMUSCULAR; INTRAVENOUS; SUBCUTANEOUS at 18:27

## 2024-05-18 RX ADMIN — ONDANSETRON 4 MG: 2 INJECTION INTRAMUSCULAR; INTRAVENOUS at 18:24

## 2024-05-18 ASSESSMENT — LIFESTYLE VARIABLES
HOW MANY STANDARD DRINKS CONTAINING ALCOHOL DO YOU HAVE ON A TYPICAL DAY: 1 OR 2
HOW OFTEN DO YOU HAVE A DRINK CONTAINING ALCOHOL: 2-4 TIMES A MONTH

## 2024-05-18 ASSESSMENT — PAIN SCALES - GENERAL
PAINLEVEL_OUTOF10: 9
PAINLEVEL_OUTOF10: 8
PAINLEVEL_OUTOF10: 7

## 2024-05-18 ASSESSMENT — PAIN - FUNCTIONAL ASSESSMENT: PAIN_FUNCTIONAL_ASSESSMENT: 0-10

## 2024-05-18 NOTE — ED PROVIDER NOTES
Emergency Department Provider Note       PCP: Sharath Aaron MD   Age: 54 y.o.   Sex: female     DISPOSITION         ICD-10-CM    1. Urinary tract infection without hematuria, site unspecified  N39.0       2. Hypokalemia  E87.6       3. Cervical paraspinal muscle spasm  M62.838       4. Cervical radiculopathy  M54.12 [facility]      5. DDD (degenerative disc disease), cervical  M50.30           Medical Decision Making     Patient with palpable spasm and pain over the left paracervical area however blood pressure elevated, right-sided headache and NIH of 2 in triage due to sensation and possible slight drift of the left arm.  The symptoms have been going on for over a week now according to the patient.  She does have a primary care physician.  I will place a referral to orthopedics for what appears to be cervical radiculopathy and provide symptomatic treatment in the meantime.  Urine did show 3+ bacteria and infection today so it was sent for culture.  We will need to call the patient if it shows she does need to change antibiotics.  I will send her home with Omnicef in the meantime and give 1 dose of ceftriaxone here in the ED.  She should follow her blood pressures at home with the same monitor and check 2-3 times a week, keeping a record and take that with her to follow-up with her primary care physician.  It certainly could be elevated due to pain.  She is neurologically intact and there are no urgent or emergent signs or symptoms at this time to indicate an emergent need for further evaluation here in the ED.  6:33 PM EDT The patient's care will be transitioned to Dr. Guerrero.  At this time, the plan is awaiting images from CT head, CTA head/neck.  Please see that provider's documentation for further information.         1 or more acute illnesses that pose a threat to life or bodily function.   Prescription drug management performed.  Chronic medical problems impacting care  - 2.0 %    Immature Granulocytes % 0 0.0 - 5.0 %    Neutrophils Absolute 3.5 1.7 - 8.2 K/UL    Lymphocytes Absolute 1.4 0.5 - 4.6 K/UL    Monocytes Absolute 0.3 0.1 - 1.3 K/UL    Eosinophils Absolute 0.1 0.0 - 0.8 K/UL    Basophils Absolute 0.1 0.0 - 0.2 K/UL    Immature Granulocytes Absolute 0.0 0.0 - 0.5 K/UL   Comprehensive Metabolic Panel   Result Value Ref Range    Sodium 144 136 - 145 mmol/L    Potassium 3.2 (L) 3.5 - 5.1 mmol/L    Chloride 109 (H) 98 - 107 mmol/L    CO2 21 20 - 28 mmol/L    Anion Gap 14 9 - 18 mmol/L    Glucose 100 (H) 70 - 99 mg/dL    BUN 10 6 - 23 MG/DL    Creatinine 0.77 0.60 - 1.10 MG/DL    Est, Glom Filt Rate >90 >60 ml/min/1.73m2    Calcium 8.8 8.8 - 10.2 MG/DL    Total Bilirubin <0.2 0.0 - 1.2 MG/DL    ALT 12 12 - 65 U/L    AST 25 15 - 37 U/L    Alk Phosphatase 95 35 - 104 U/L    Total Protein 7.2 6.3 - 8.2 g/dL    Albumin 3.6 3.5 - 5.0 g/dL    Globulin 3.6 (H) 2.3 - 3.5 g/dL    Albumin/Globulin Ratio 1.0 1.0 - 1.9     Protime-INR   Result Value Ref Range    Protime 13.4 11.3 - 14.9 sec    INR 1.0     Urinalysis w rflx microscopic   Result Value Ref Range    Color, UA DARK YELLOW      Appearance CLOUDY      Specific Gravity, UA >1.035 (H) 1.001 - 1.023    pH, Urine 5.5 5.0 - 9.0      Protein, UA 30 (A) NEG mg/dL    Glucose, Ur Negative mg/dL    Ketones, Urine TRACE (A) NEG mg/dL    Bilirubin, Urine Negative NEG      Blood, Urine Negative NEG      Urobilinogen, Urine 1.0 0.2 - 1.0 EU/dL    Nitrite, Urine Negative NEG      Leukocyte Esterase, Urine Negative NEG      WBC, UA 0-3 0 /hpf    RBC, UA 0-3 0 /hpf    Epithelial Cells, UA 5-10 0 /hpf    BACTERIA, URINE 3+ (H) 0 /hpf    Casts HYALINE 0 /lpf    Mucus, UA 2+ (H) 0 /lpf    Other observations RESULTS VERIFIED MANUALLY     Troponin   Result Value Ref Range    Troponin T 10.0 0 - 14 ng/L   EKG 12 Lead   Result Value Ref Range    Ventricular Rate 72 BPM    Atrial Rate 72 BPM    P-R Interval 160 ms    QRS Duration 88 ms    Q-T Interval

## 2024-05-18 NOTE — ED TRIAGE NOTES
Pt to ED ambulatory to triage with c/o left arm pain with associated numbness and tingling for the past week. Pt states hx of a broken left wrist but was never able to have surgery.. Pt states pain is in the left side of her neck as well and radiates down her shoulder and arm. Pt states hx of elevated blood pressure. Pt is hypertensive in triage at 173/107. Pt has decreased sensation on the left upper extremity as well. NIH 2 Left arm drift and sensation decrease pt states weakness keeping arm up. Pt now endorsing right sided headache that began when the left arm s/sx began as well. Pt states an episode of blurry vision this AM.

## 2024-05-18 NOTE — DISCHARGE INSTRUCTIONS
Use warm heat, massage and stretching multiple times a day to your neck.  Use ice to your left shoulder and do gentle range of motion with that.  Take the medication as directed. The urine does show infection today. Please take the antibiotics as directed and finish all of them. The urine was sent for culture.  This could take 4 to 5 days to result and if it shows that we need to change your antibiotics, we will call you and let you know.  If you are worsening in any way prior to then, please return to the ED for further evaluation immediately.  Drink plenty of fluids and rest. Please follow up with PCP for recheck.  Follow-up with orthopedics for the neck, shoulder and arm.  Your blood pressure was elevated slightly here in the emergency department which could be coming from pain.  Please keep a check on it at home, checking it 2-3 times a week with the same blood pressure monitor, keep a record and take that with you to your primary care physician to follow-up so they can assess the levels.  Take an over-the-counter potassium supplement for about 5 days or drink a glass of orange juice or eat a banana daily for 5 days.  Your potassium was slightly low today at 3.2.

## 2024-05-19 LAB
BACTERIA SPEC CULT: NORMAL
EKG ATRIAL RATE: 72 BPM
EKG DIAGNOSIS: NORMAL
EKG P AXIS: 80 DEGREES
EKG P-R INTERVAL: 160 MS
EKG Q-T INTERVAL: 392 MS
EKG QRS DURATION: 88 MS
EKG QTC CALCULATION (BAZETT): 429 MS
EKG R AXIS: 51 DEGREES
EKG T AXIS: 61 DEGREES
EKG VENTRICULAR RATE: 72 BPM
SERVICE CMNT-IMP: NORMAL

## 2024-05-19 PROCEDURE — 93010 ELECTROCARDIOGRAM REPORT: CPT | Performed by: INTERNAL MEDICINE

## 2024-05-19 NOTE — ED PROVIDER NOTES
Emergency Department Provider Signout / Continuation of Care Note         DISPOSITION Decision To Discharge 05/18/2024 08:33:24 PM       ICD-10-CM    1. Urinary tract infection without hematuria, site unspecified  N39.0       2. Hypokalemia  E87.6       3. Cervical paraspinal muscle spasm  M62.838       4. Cervical radiculopathy  M54.12 [facility]      5. DDD (degenerative disc disease), cervical  M50.30           The patient's care was signed out to me at shift change.      Final Plan      Patient with left cervical radiculopathy.  Pain down the arm with some numbness and tingling.  No acute on CT or CTA.  Will discharge patient with orthopedic follow-up and medication outpatient for pain.       1 or more acute illnesses that pose a threat to life or bodily function.   Prescription drug management performed.  Patient was discharged risks and benefits of hospitalization were considered.  Shared medical decision making was utilized in creating the patients health plan today.                         Armando Guerrero III, MD  05/18/24 2034

## 2024-05-19 NOTE — ED NOTES
Patient mobility status  with no difficulty. Provider aware     I have reviewed discharge instructions with the patient.  The patient verbalized understanding.    Patient left ED via Discharge Method: ambulatory to Home with  self .    Opportunity for questions and clarification provided.     Patient given 4 scripts.           Felisa Andujar RN  05/18/24 [id number]

## 2024-05-21 ENCOUNTER — OFFICE VISIT (OUTPATIENT)
Dept: INTERNAL MEDICINE CLINIC | Facility: CLINIC | Age: 54
End: 2024-05-21
Payer: COMMERCIAL

## 2024-05-21 VITALS
OXYGEN SATURATION: 100 % | SYSTOLIC BLOOD PRESSURE: 159 MMHG | HEART RATE: 71 BPM | RESPIRATION RATE: 16 BRPM | HEIGHT: 69 IN | BODY MASS INDEX: 28.97 KG/M2 | DIASTOLIC BLOOD PRESSURE: 95 MMHG | TEMPERATURE: 98.4 F | WEIGHT: 195.6 LBS

## 2024-05-21 DIAGNOSIS — E78.5 HYPERLIPIDEMIA, UNSPECIFIED HYPERLIPIDEMIA TYPE: ICD-10-CM

## 2024-05-21 DIAGNOSIS — N18.31 STAGE 3A CHRONIC KIDNEY DISEASE (HCC): ICD-10-CM

## 2024-05-21 DIAGNOSIS — I10 PRIMARY HYPERTENSION: ICD-10-CM

## 2024-05-21 DIAGNOSIS — K21.9 GASTROESOPHAGEAL REFLUX DISEASE WITHOUT ESOPHAGITIS: ICD-10-CM

## 2024-05-21 DIAGNOSIS — M54.12 CERVICAL RADICULOPATHY: Primary | ICD-10-CM

## 2024-05-21 DIAGNOSIS — F51.01 PRIMARY INSOMNIA: ICD-10-CM

## 2024-05-21 DIAGNOSIS — M54.2 NECK PAIN: ICD-10-CM

## 2024-05-21 DIAGNOSIS — R73.09 ELEVATED HEMOGLOBIN A1C: ICD-10-CM

## 2024-05-21 LAB
BACTERIA SPEC CULT: NORMAL
BACTERIA SPEC CULT: NORMAL
SERVICE CMNT-IMP: NORMAL

## 2024-05-21 PROCEDURE — 99214 OFFICE O/P EST MOD 30 MIN: CPT | Performed by: FAMILY MEDICINE

## 2024-05-21 PROCEDURE — 3077F SYST BP >= 140 MM HG: CPT | Performed by: FAMILY MEDICINE

## 2024-05-21 PROCEDURE — 3080F DIAST BP >= 90 MM HG: CPT | Performed by: FAMILY MEDICINE

## 2024-05-21 RX ORDER — TIZANIDINE 4 MG/1
4 TABLET ORAL 3 TIMES DAILY PRN
Qty: 30 TABLET | Refills: 0 | Status: SHIPPED | OUTPATIENT
Start: 2024-05-21

## 2024-05-21 RX ORDER — PREDNISONE 20 MG/1
20 TABLET ORAL 2 TIMES DAILY
Qty: 10 TABLET | Refills: 0 | Status: SHIPPED | OUTPATIENT
Start: 2024-05-21 | End: 2024-05-26

## 2024-05-21 RX ORDER — HYDROCHLOROTHIAZIDE 25 MG/1
25 TABLET ORAL EVERY MORNING
Qty: 90 TABLET | Refills: 1 | Status: SHIPPED | OUTPATIENT
Start: 2024-05-21

## 2024-05-21 RX ORDER — OMEPRAZOLE 40 MG/1
40 CAPSULE, DELAYED RELEASE ORAL DAILY
Qty: 90 CAPSULE | Refills: 1 | Status: SHIPPED | OUTPATIENT
Start: 2024-05-21 | End: 2024-11-17

## 2024-05-21 RX ORDER — ZOLPIDEM TARTRATE 10 MG/1
10 TABLET ORAL NIGHTLY PRN
Qty: 30 TABLET | Refills: 0 | Status: SHIPPED | OUTPATIENT
Start: 2024-05-21 | End: 2024-06-20

## 2024-05-21 RX ORDER — DILTIAZEM HYDROCHLORIDE 240 MG/1
240 CAPSULE, EXTENDED RELEASE ORAL DAILY
Qty: 90 CAPSULE | Refills: 1 | Status: SHIPPED | OUTPATIENT
Start: 2024-05-21

## 2024-05-21 SDOH — ECONOMIC STABILITY: INCOME INSECURITY: HOW HARD IS IT FOR YOU TO PAY FOR THE VERY BASICS LIKE FOOD, HOUSING, MEDICAL CARE, AND HEATING?: NOT HARD AT ALL

## 2024-05-21 SDOH — ECONOMIC STABILITY: HOUSING INSECURITY
IN THE LAST 12 MONTHS, WAS THERE A TIME WHEN YOU DID NOT HAVE A STEADY PLACE TO SLEEP OR SLEPT IN A SHELTER (INCLUDING NOW)?: NO

## 2024-05-21 SDOH — ECONOMIC STABILITY: FOOD INSECURITY: WITHIN THE PAST 12 MONTHS, YOU WORRIED THAT YOUR FOOD WOULD RUN OUT BEFORE YOU GOT MONEY TO BUY MORE.: NEVER TRUE

## 2024-05-21 SDOH — ECONOMIC STABILITY: FOOD INSECURITY: WITHIN THE PAST 12 MONTHS, THE FOOD YOU BOUGHT JUST DIDN'T LAST AND YOU DIDN'T HAVE MONEY TO GET MORE.: NEVER TRUE

## 2024-05-21 ASSESSMENT — PATIENT HEALTH QUESTIONNAIRE - PHQ9
1. LITTLE INTEREST OR PLEASURE IN DOING THINGS: NEARLY EVERY DAY
SUM OF ALL RESPONSES TO PHQ QUESTIONS 1-9: 19
9. THOUGHTS THAT YOU WOULD BE BETTER OFF DEAD, OR OF HURTING YOURSELF: NOT AT ALL
5. POOR APPETITE OR OVEREATING: NEARLY EVERY DAY
7. TROUBLE CONCENTRATING ON THINGS, SUCH AS READING THE NEWSPAPER OR WATCHING TELEVISION: SEVERAL DAYS
4. FEELING TIRED OR HAVING LITTLE ENERGY: NEARLY EVERY DAY
SUM OF ALL RESPONSES TO PHQ QUESTIONS 1-9: 19
8. MOVING OR SPEAKING SO SLOWLY THAT OTHER PEOPLE COULD HAVE NOTICED. OR THE OPPOSITE, BEING SO FIGETY OR RESTLESS THAT YOU HAVE BEEN MOVING AROUND A LOT MORE THAN USUAL: MORE THAN HALF THE DAYS
3. TROUBLE FALLING OR STAYING ASLEEP: NEARLY EVERY DAY
2. FEELING DOWN, DEPRESSED OR HOPELESS: NEARLY EVERY DAY
10. IF YOU CHECKED OFF ANY PROBLEMS, HOW DIFFICULT HAVE THESE PROBLEMS MADE IT FOR YOU TO DO YOUR WORK, TAKE CARE OF THINGS AT HOME, OR GET ALONG WITH OTHER PEOPLE: SOMEWHAT DIFFICULT
SUM OF ALL RESPONSES TO PHQ9 QUESTIONS 1 & 2: 6
6. FEELING BAD ABOUT YOURSELF - OR THAT YOU ARE A FAILURE OR HAVE LET YOURSELF OR YOUR FAMILY DOWN: SEVERAL DAYS

## 2024-05-21 NOTE — PROGRESS NOTES
Franko Dalal (:  1970) is a 54 y.o. female,Established patient, here for evaluation of the following chief complaint(s):  Hypertension (ER follow- up on 2024 UTI  left arm tingling)      Assessment & Plan   1. Cervical radiculopathy  -     predniSONE (DELTASONE) 20 MG tablet; Take 1 tablet by mouth 2 times daily for 5 days, Disp-10 tablet, R-0Normal  2. Primary hypertension  -     dilTIAZem (TIAZAC) 240 MG extended release capsule; Take 1 capsule by mouth daily, Disp-90 capsule, R-1Normal  -     hydroCHLOROthiazide (HYDRODIURIL) 25 MG tablet; Take 1 tablet by mouth every morning, Disp-90 tablet, R-1Normal  3. Gastroesophageal reflux disease without esophagitis  -     omeprazole (PRILOSEC) 40 MG delayed release capsule; Take 1 capsule by mouth Daily, Disp-90 capsule, R-1Normal  4. Primary insomnia  -     zolpidem (AMBIEN) 10 MG tablet; Take 1 tablet by mouth nightly as needed for Sleep for up to 30 days. Max Daily Amount: 10 mg, Disp-30 tablet, R-0Normal  5. Neck pain  -     tiZANidine (ZANAFLEX) 4 MG tablet; Take 1 tablet by mouth 3 times daily as needed (for muscle spasm), Disp-30 tablet, R-0Normal  1.  Cervical radiculopathy.  Patient told to call [facility] for appointment.  They had left 2 voice messages in her mailbox.  Will place patient on prednisone and see if this helps with radiculopathy.  2.  Hypertension.  Blood pressure elevated probably secondary to pain.  Patient advised to monitor blood pressure at home.  Continue taking diltiazem and hydrochlorothiazide.  3.  GERD stable.  Taking omeprazole.  4.  Insomnia stable.  Ambien refilled.  5.  Neck pain.  Will place on tizanidine for now.  Return in about 6 weeks (around 2024) for ffup with labs prior to visit.       Subjective   54-year-old female patient comes in for follow-up from emergency room.  Patient had complained of left arm pain with numbness and tingling for the past week.  Patient also complains of pain

## 2024-07-09 DIAGNOSIS — M54.2 NECK PAIN: ICD-10-CM

## 2024-07-09 DIAGNOSIS — F51.01 PRIMARY INSOMNIA: ICD-10-CM

## 2024-07-12 RX ORDER — TIZANIDINE 4 MG/1
4 TABLET ORAL 3 TIMES DAILY PRN
Qty: 30 TABLET | Refills: 0 | Status: SHIPPED | OUTPATIENT
Start: 2024-07-12

## 2024-07-12 RX ORDER — ZOLPIDEM TARTRATE 10 MG/1
10 TABLET ORAL NIGHTLY PRN
Qty: 30 TABLET | Refills: 0 | Status: SHIPPED | OUTPATIENT
Start: 2024-07-12 | End: 2024-08-11

## 2024-07-12 NOTE — TELEPHONE ENCOUNTER
Refill request on:  Tizanidine  4 mg - Take 1 tablet by mouth 3 times daily as needed (for muscle spasm)     Zolpidem ( ambien ) 10 mg - Take 1 tablet by mouth nightly as needed for Sleep for up to 30 days. Max Daily Amount: 10 mg       LOV - 5/21/24 w/ Dr. Aaron   Next appt - 8/14/24 w/      RX pended   Thank you!

## 2024-10-06 ENCOUNTER — HOSPITAL ENCOUNTER (EMERGENCY)
Age: 54
Discharge: HOME | End: 2024-10-06
Payer: COMMERCIAL

## 2024-10-06 ENCOUNTER — APPOINTMENT (OUTPATIENT)
Dept: ULTRASOUND IMAGING | Age: 54
End: 2024-10-06
Payer: COMMERCIAL

## 2024-10-06 VITALS
WEIGHT: 210 LBS | BODY MASS INDEX: 31.1 KG/M2 | HEART RATE: 104 BPM | OXYGEN SATURATION: 96 % | DIASTOLIC BLOOD PRESSURE: 85 MMHG | TEMPERATURE: 98.3 F | SYSTOLIC BLOOD PRESSURE: 135 MMHG | RESPIRATION RATE: 18 BRPM | HEIGHT: 69 IN

## 2024-10-06 DIAGNOSIS — I82.441 ACUTE DEEP VEIN THROMBOSIS (DVT) OF RIGHT TIBIAL VEIN (HCC): ICD-10-CM

## 2024-10-06 DIAGNOSIS — I82.411 ACUTE DEEP VEIN THROMBOSIS (DVT) OF RIGHT FEMORAL VEIN (HCC): Primary | ICD-10-CM

## 2024-10-06 DIAGNOSIS — I82.403 RECURRENT ACUTE DEEP VEIN THROMBOSIS (DVT) OF BOTH LOWER EXTREMITIES (HCC): ICD-10-CM

## 2024-10-06 PROCEDURE — 99284 EMERGENCY DEPT VISIT MOD MDM: CPT

## 2024-10-06 PROCEDURE — 93971 EXTREMITY STUDY: CPT

## 2024-10-06 PROCEDURE — 6370000000 HC RX 637 (ALT 250 FOR IP)

## 2024-10-06 RX ORDER — HYDROCODONE BITARTRATE AND ACETAMINOPHEN 5; 325 MG/1; MG/1
1 TABLET ORAL EVERY 6 HOURS PRN
Qty: 10 TABLET | Refills: 0 | Status: SHIPPED | OUTPATIENT
Start: 2024-10-06 | End: 2024-10-09

## 2024-10-06 RX ORDER — HYDROCODONE BITARTRATE AND ACETAMINOPHEN 5; 325 MG/1; MG/1
1 TABLET ORAL
Status: COMPLETED | OUTPATIENT
Start: 2024-10-06 | End: 2024-10-06

## 2024-10-06 RX ADMIN — APIXABAN 10 MG: 5 TABLET, FILM COATED ORAL at 19:14

## 2024-10-06 RX ADMIN — HYDROCODONE BITARTRATE AND ACETAMINOPHEN 1 TABLET: 5; 325 TABLET ORAL at 16:54

## 2024-10-06 ASSESSMENT — PAIN DESCRIPTION - ORIENTATION
ORIENTATION: RIGHT

## 2024-10-06 ASSESSMENT — PAIN DESCRIPTION - LOCATION
LOCATION: LEG

## 2024-10-06 ASSESSMENT — PAIN SCALES - GENERAL
PAINLEVEL_OUTOF10: 9
PAINLEVEL_OUTOF10: 9
PAINLEVEL_OUTOF10: 10

## 2024-10-06 ASSESSMENT — PAIN DESCRIPTION - DESCRIPTORS: DESCRIPTORS: THROBBING;ACHING

## 2024-10-06 ASSESSMENT — PAIN - FUNCTIONAL ASSESSMENT: PAIN_FUNCTIONAL_ASSESSMENT: 0-10

## 2024-10-06 NOTE — ED TRIAGE NOTES
Pt ambulatory to triage c/o R calf swelling, pain, and inflammation (x 2 days). Pt denies SOB and chest pain. Pt reports the pain radiates up her calf into her thigh. Pt reports she is supposed to be taking eliquis but is non compliant w/ it. Pt reports hx of DVTs and PEs.

## 2024-10-06 NOTE — ED NOTES
Patient mobility status  with no difficulty. Provider aware     I have reviewed discharge instructions with the patient.  The patient verbalized understanding.    Patient left ED via Discharge Method: ambulatory to Home with Significant Other.    Opportunity for questions and clarification provided.     Patient given 2 e-scripts.            Sultana Avila RN  10/06/24 [id number]

## 2024-10-06 NOTE — DISCHARGE INSTRUCTIONS
Prescription for Eliquis sent to your pharmacy. Start this tomorrow. I have also placed a referral to hematology for follow-up. Please note the hydrocodone (pain medication) may make you feel drowsy and likely will impair your ability to drive. Do not take this medication with any previously prescribed benzodiazepines such as Xanax, muscle relaxers such as Zanaflex, or sleep aides such as Ambien. Please return with any new or worsening symptoms.    Visit [email or web address]    Or simply google 'Eliquis copay card' to see if you qualify for discount card.

## 2024-10-06 NOTE — ED PROVIDER NOTES
Emergency Department Provider Note       PCP: No primary care provider on file.   Age: 54 y.o.   Sex: female     DISPOSITION Decision To Discharge 10/06/2024 07:19:24 PM  Condition at Disposition: Data Unavailable       ICD-10-CM    1. Acute deep vein thrombosis (DVT) of right femoral vein (HCC)  I82.411 HYDROcodone-acetaminophen (NORCO) 5-325 MG per tablet      2. Acute deep vein thrombosis (DVT) of right tibial vein (HCC)  I82.441 HYDROcodone-acetaminophen (NORCO) 5-325 MG per tablet      3. Recurrent acute deep vein thrombosis (DVT) of both lower extremities ([facility])  I82.403 [facility]          Medical Decision Making     Patient arrives mildly tachycardic but otherwise with stable vital signs, ambulatory no acute distress.  Exam demonstrates approximately 2+ nonpitting edema unilaterally to the right lower extremity beyond the level of the popliteal fossa.  Concern for recurrence of DVT.  Nothing to suggest cellulitis or abscess.  Will plan for ultrasound.    Ultrasound demonstrates occlusive DVT to right distal femoral vein down to the level of the posterior tibial vein.  She has no signs or symptoms to suggest embolization.  Reviewed her case with interventional radiologist Dr. Monique who did not feel that she would require any procedural treatment.  I will plan to restart her on a DOAC with co-pay card and refer her to hematology for follow-up given recurrent nature of her DVTs.     1 acute complicated illness or injury.  Prescription drug management performed.  Patient was discharged risks and benefits of hospitalization were considered.  Shared medical decision making was utilized in creating the patients health plan today.  Diagnosis or care significantly limited by social determinants of health financial barrier.    I independently ordered and reviewed each unique test.       I interpreted the Ultrasound  occlusive DVT to right lower extremity.    The management of this

## 2024-10-11 ENCOUNTER — OFFICE VISIT (OUTPATIENT)
Dept: FAMILY MEDICINE CLINIC | Facility: CLINIC | Age: 54
End: 2024-10-11

## 2024-10-11 VITALS
OXYGEN SATURATION: 99 % | RESPIRATION RATE: 18 BRPM | TEMPERATURE: 98.3 F | DIASTOLIC BLOOD PRESSURE: 95 MMHG | HEART RATE: 79 BPM | BODY MASS INDEX: 31.84 KG/M2 | SYSTOLIC BLOOD PRESSURE: 132 MMHG | HEIGHT: 69 IN | WEIGHT: 215 LBS

## 2024-10-11 DIAGNOSIS — F51.01 PRIMARY INSOMNIA: ICD-10-CM

## 2024-10-11 DIAGNOSIS — Z23 NEED FOR SHINGLES VACCINE: ICD-10-CM

## 2024-10-11 DIAGNOSIS — K21.9 GASTROESOPHAGEAL REFLUX DISEASE WITHOUT ESOPHAGITIS: ICD-10-CM

## 2024-10-11 DIAGNOSIS — Z13.220 SCREENING FOR LIPID DISORDERS: ICD-10-CM

## 2024-10-11 DIAGNOSIS — M54.2 NECK PAIN: ICD-10-CM

## 2024-10-11 DIAGNOSIS — R73.09 ELEVATED HEMOGLOBIN A1C: ICD-10-CM

## 2024-10-11 DIAGNOSIS — I82.499 DEEP VEIN THROMBOSIS (DVT) OF OTHER VEIN OF LOWER EXTREMITY, UNSPECIFIED CHRONICITY, UNSPECIFIED LATERALITY (HCC): Primary | ICD-10-CM

## 2024-10-11 DIAGNOSIS — Z12.31 ENCOUNTER FOR SCREENING MAMMOGRAM FOR BREAST CANCER: ICD-10-CM

## 2024-10-11 DIAGNOSIS — I10 PRIMARY HYPERTENSION: ICD-10-CM

## 2024-10-11 RX ORDER — OMEPRAZOLE 40 MG/1
40 CAPSULE, DELAYED RELEASE ORAL DAILY
Qty: 90 CAPSULE | Refills: 1 | Status: SHIPPED | OUTPATIENT
Start: 2024-10-11 | End: 2025-04-09

## 2024-10-11 RX ORDER — DILTIAZEM HYDROCHLORIDE 240 MG/1
240 CAPSULE, EXTENDED RELEASE ORAL DAILY
Qty: 90 CAPSULE | Refills: 1 | Status: SHIPPED | OUTPATIENT
Start: 2024-10-11

## 2024-10-11 RX ORDER — AMITRIPTYLINE HYDROCHLORIDE 50 MG/1
50 TABLET ORAL NIGHTLY
COMMUNITY
Start: 2024-09-24

## 2024-10-11 RX ORDER — ZOLPIDEM TARTRATE 10 MG/1
10 TABLET ORAL NIGHTLY PRN
Qty: 30 TABLET | Refills: 0 | Status: SHIPPED | OUTPATIENT
Start: 2024-10-11 | End: 2024-11-10

## 2024-10-11 RX ORDER — HYDROCHLOROTHIAZIDE 25 MG/1
25 TABLET ORAL EVERY MORNING
Qty: 90 TABLET | Refills: 1 | Status: SHIPPED | OUTPATIENT
Start: 2024-10-11

## 2024-10-11 ASSESSMENT — PATIENT HEALTH QUESTIONNAIRE - PHQ9
7. TROUBLE CONCENTRATING ON THINGS, SUCH AS READING THE NEWSPAPER OR WATCHING TELEVISION: SEVERAL DAYS
6. FEELING BAD ABOUT YOURSELF - OR THAT YOU ARE A FAILURE OR HAVE LET YOURSELF OR YOUR FAMILY DOWN: MORE THAN HALF THE DAYS
SUM OF ALL RESPONSES TO PHQ QUESTIONS 1-9: 15
10. IF YOU CHECKED OFF ANY PROBLEMS, HOW DIFFICULT HAVE THESE PROBLEMS MADE IT FOR YOU TO DO YOUR WORK, TAKE CARE OF THINGS AT HOME, OR GET ALONG WITH OTHER PEOPLE: SOMEWHAT DIFFICULT
4. FEELING TIRED OR HAVING LITTLE ENERGY: NEARLY EVERY DAY
1. LITTLE INTEREST OR PLEASURE IN DOING THINGS: NEARLY EVERY DAY
SUM OF ALL RESPONSES TO PHQ QUESTIONS 1-9: 15
3. TROUBLE FALLING OR STAYING ASLEEP: NOT AT ALL
SUM OF ALL RESPONSES TO PHQ QUESTIONS 1-9: 15
5. POOR APPETITE OR OVEREATING: NOT AT ALL
2. FEELING DOWN, DEPRESSED OR HOPELESS: NEARLY EVERY DAY
9. THOUGHTS THAT YOU WOULD BE BETTER OFF DEAD, OR OF HURTING YOURSELF: NOT AT ALL
SUM OF ALL RESPONSES TO PHQ9 QUESTIONS 1 & 2: 6
8. MOVING OR SPEAKING SO SLOWLY THAT OTHER PEOPLE COULD HAVE NOTICED. OR THE OPPOSITE, BEING SO FIGETY OR RESTLESS THAT YOU HAVE BEEN MOVING AROUND A LOT MORE THAN USUAL: NEARLY EVERY DAY
SUM OF ALL RESPONSES TO PHQ QUESTIONS 1-9: 15

## 2024-10-11 ASSESSMENT — ANXIETY QUESTIONNAIRES
1. FEELING NERVOUS, ANXIOUS, OR ON EDGE: NEARLY EVERY DAY
2. NOT BEING ABLE TO STOP OR CONTROL WORRYING: MORE THAN HALF THE DAYS
5. BEING SO RESTLESS THAT IT IS HARD TO SIT STILL: NEARLY EVERY DAY
GAD7 TOTAL SCORE: 17
4. TROUBLE RELAXING: SEVERAL DAYS
7. FEELING AFRAID AS IF SOMETHING AWFUL MIGHT HAPPEN: MORE THAN HALF THE DAYS
IF YOU CHECKED OFF ANY PROBLEMS ON THIS QUESTIONNAIRE, HOW DIFFICULT HAVE THESE PROBLEMS MADE IT FOR YOU TO DO YOUR WORK, TAKE CARE OF THINGS AT HOME, OR GET ALONG WITH OTHER PEOPLE: VERY DIFFICULT
6. BECOMING EASILY ANNOYED OR IRRITABLE: NEARLY EVERY DAY
3. WORRYING TOO MUCH ABOUT DIFFERENT THINGS: NEARLY EVERY DAY

## 2024-10-11 ASSESSMENT — ENCOUNTER SYMPTOMS
SHORTNESS OF BREATH: 0
ABDOMINAL PAIN: 0

## 2024-10-11 NOTE — PROGRESS NOTES
Tympanic membrane and ear canal normal.      Nose: Nose normal.      Mouth/Throat:      Mouth: Mucous membranes are moist.      Pharynx: Oropharynx is clear. No oropharyngeal exudate.   Eyes:      Extraocular Movements: Extraocular movements intact.      Conjunctiva/sclera: Conjunctivae normal.      Pupils: Pupils are equal, round, and reactive to light.   Cardiovascular:      Rate and Rhythm: Normal rate and regular rhythm.      Heart sounds: No murmur heard.     No friction rub. No gallop.   Pulmonary:      Effort: Pulmonary effort is normal.      Breath sounds: No wheezing, rhonchi or rales.   Abdominal:      Palpations: Abdomen is soft.      Tenderness: There is no abdominal tenderness.   Musculoskeletal:      Cervical back: Normal range of motion.      Right upper leg: Swelling present.      Right lower leg: Swelling present.      Right ankle: No swelling. Normal pulse.   Skin:     General: Skin is warm and dry.      Capillary Refill: Capillary refill takes less than 2 seconds.   Neurological:      General: No focal deficit present.      Mental Status: She is alert and oriented to person, place, and time.      Sensory: No sensory deficit.      Motor: No weakness.      Gait: Gait normal.   Psychiatric:         Mood and Affect: Mood normal.         Thought Content: Thought content normal.         Judgment: Judgment normal.             Personalized Preventive Plan  Current Health Maintenance Status  Immunization History   Administered Date(s) Administered    COVID-19, MODERNA BLUE border, Primary or Immunocompromised, (age 12y+), IM, 100 mcg/0.5mL 03/02/2021, 05/21/2021    COVID-19, MODERNA Booster BLUE border, (age 18y+), IM, 50mcg/0.25mL 02/28/2021, 05/21/2021, 07/21/2021    DTP 05/04/1973, 06/15/1973    Influenza Virus Vaccine 10/05/2008    Influenza, AFLURIA, FLUZONE, (age2 y+), IM, Trivalent MDV, 0.5mL 11/01/2017    Influenza, FLUAD, (age 65 y+), IM, Quadv, 0.5mL 10/14/2020    Influenza, FLUARIX, FLULAVAL,

## 2024-10-16 ENCOUNTER — OFFICE VISIT (OUTPATIENT)
Dept: FAMILY MEDICINE CLINIC | Facility: CLINIC | Age: 54
End: 2024-10-16

## 2024-10-16 VITALS — TEMPERATURE: 97.9 F | RESPIRATION RATE: 18 BRPM | WEIGHT: 215 LBS | HEIGHT: 69 IN | BODY MASS INDEX: 31.84 KG/M2

## 2024-10-16 DIAGNOSIS — Z13.220 SCREENING FOR LIPID DISORDERS: ICD-10-CM

## 2024-10-16 DIAGNOSIS — I10 PRIMARY HYPERTENSION: ICD-10-CM

## 2024-10-16 DIAGNOSIS — I82.499 DEEP VEIN THROMBOSIS (DVT) OF OTHER VEIN OF LOWER EXTREMITY, UNSPECIFIED CHRONICITY, UNSPECIFIED LATERALITY (HCC): ICD-10-CM

## 2024-10-16 DIAGNOSIS — R73.09 ELEVATED HEMOGLOBIN A1C: ICD-10-CM

## 2024-10-16 LAB
ALBUMIN SERPL-MCNC: 3 G/DL (ref 3.5–5)
ALBUMIN/GLOB SERPL: 0.7 (ref 1–1.9)
ALP SERPL-CCNC: 144 U/L (ref 35–104)
ALT SERPL-CCNC: 6 U/L (ref 8–45)
ANION GAP SERPL CALC-SCNC: 12 MMOL/L (ref 9–18)
AST SERPL-CCNC: 18 U/L (ref 15–37)
BASOPHILS # BLD: 0.1 K/UL (ref 0–0.2)
BASOPHILS NFR BLD: 1 % (ref 0–2)
BILIRUB SERPL-MCNC: 0.2 MG/DL (ref 0–1.2)
BUN SERPL-MCNC: 18 MG/DL (ref 6–23)
CALCIUM SERPL-MCNC: 9.2 MG/DL (ref 8.8–10.2)
CHLORIDE SERPL-SCNC: 105 MMOL/L (ref 98–107)
CHOLEST SERPL-MCNC: 213 MG/DL (ref 0–200)
CO2 SERPL-SCNC: 24 MMOL/L (ref 20–28)
CREAT SERPL-MCNC: 0.92 MG/DL (ref 0.6–1.1)
DIFFERENTIAL METHOD BLD: ABNORMAL
EOSINOPHIL # BLD: 0.2 K/UL (ref 0–0.8)
EOSINOPHIL NFR BLD: 3 % (ref 0.5–7.8)
ERYTHROCYTE [DISTWIDTH] IN BLOOD BY AUTOMATED COUNT: 14.6 % (ref 11.9–14.6)
EST. AVERAGE GLUCOSE BLD GHB EST-MCNC: 127 MG/DL
GLOBULIN SER CALC-MCNC: 4.5 G/DL (ref 2.3–3.5)
GLUCOSE SERPL-MCNC: 89 MG/DL (ref 70–99)
HBA1C MFR BLD: 6.1 % (ref 0–5.6)
HCT VFR BLD AUTO: 34.1 % (ref 35.8–46.3)
HDLC SERPL-MCNC: 81 MG/DL (ref 40–60)
HDLC SERPL: 2.6 (ref 0–5)
HGB BLD-MCNC: 10.7 G/DL (ref 11.7–15.4)
IMM GRANULOCYTES # BLD AUTO: 0 K/UL (ref 0–0.5)
IMM GRANULOCYTES NFR BLD AUTO: 0 % (ref 0–5)
LDLC SERPL CALC-MCNC: 118 MG/DL (ref 0–100)
LYMPHOCYTES # BLD: 2.1 K/UL (ref 0.5–4.6)
LYMPHOCYTES NFR BLD: 31 % (ref 13–44)
MCH RBC QN AUTO: 27.9 PG (ref 26.1–32.9)
MCHC RBC AUTO-ENTMCNC: 31.4 G/DL (ref 31.4–35)
MCV RBC AUTO: 88.8 FL (ref 82–102)
MONOCYTES # BLD: 0.5 K/UL (ref 0.1–1.3)
MONOCYTES NFR BLD: 8 % (ref 4–12)
NEUTS SEG # BLD: 3.9 K/UL (ref 1.7–8.2)
NEUTS SEG NFR BLD: 57 % (ref 43–78)
NRBC # BLD: 0 K/UL (ref 0–0.2)
PLATELET # BLD AUTO: 527 K/UL (ref 150–450)
PMV BLD AUTO: 8.9 FL (ref 9.4–12.3)
POTASSIUM SERPL-SCNC: 4.2 MMOL/L (ref 3.5–5.1)
PROT SERPL-MCNC: 7.5 G/DL (ref 6.3–8.2)
RBC # BLD AUTO: 3.84 M/UL (ref 4.05–5.2)
SODIUM SERPL-SCNC: 141 MMOL/L (ref 136–145)
TRIGL SERPL-MCNC: 73 MG/DL (ref 0–150)
VLDLC SERPL CALC-MCNC: 15 MG/DL (ref 6–23)
WBC # BLD AUTO: 6.8 K/UL (ref 4.3–11.1)

## 2024-10-18 DIAGNOSIS — I82.499 DEEP VEIN THROMBOSIS (DVT) OF OTHER VEIN OF LOWER EXTREMITY, UNSPECIFIED CHRONICITY, UNSPECIFIED LATERALITY (HCC): Primary | ICD-10-CM

## 2024-10-21 NOTE — PROGRESS NOTES
NEW PATIENT INTAKE    Referral Diagnosis:  Recurrent acute deep vein thrombosis (DVT) of both lower extremities      Referring Provider:  Ronak Ware PA    Primary Care Provider: Brooke Kitchen PA    Family History of Cancer/ Hematology Disorders: No known family history    Presenting Symptoms: Recurrent acute deep vein thrombosis (DVT) of both lower extremities    Chronological History of Pertinent Events:     55yo black female    3/15/21 - CT OF THE CHEST WITH INTRAVENOUS CONTRAST (EPIC)  IMPRESSION:  1.  Bilateral pulmonary emboli with emboli extending from the distal left main  pulmonary artery into arteries of the left upper and lower lobes and additional  more distal emboli in the right upper and lower lobes.   2. Left upper lobe airspace changes and small dependent left pleural effusion  likely representing reactive changes from pulmonary emboli. Specifically,  airspace changes in the left upper lobe could represent a developing infarction.  3. Prominent left axillary lymph nodes. These are not clearly dysmorphic and  favored to be reactive. Specifically, this can be seen in the setting of a  recent vaccine.    3/16/21 - PROCEDURE: Pulmonary angiography and placement of thrombolysis infusion catheter with internal ultrasound wire (EPIC)    3/16/21 - BILATERAL LOWER EXTREMITY DOPPLER ULTRASOUND (EPIC)   IMPRESSION:  DVT in the left leg present as described.    3/17/21 - IR INF THROMB ART/KARIN SUBSQ DAY W CATH REMOVAL & CLOSURE  IMPRESSION:  Diagnostic venography demonstrates nonocclusive thrombus in the apex of the Cook Celect IVC filter.  One of the stabilizing struts appears to be fractured and is located at the level of L2.  Widely patent right common femoral, external iliac, and common iliac veins; widely patent left common femoral and external iliac veins; narrowed proximal left common iliac vein with collateral venous formation extending from the left internal iliac to the right internal

## 2024-10-22 ENCOUNTER — HOSPITAL ENCOUNTER (OUTPATIENT)
Dept: LAB | Age: 54
Discharge: HOME | End: 2024-10-22
Payer: COMMERCIAL

## 2024-10-22 ENCOUNTER — OFFICE VISIT (OUTPATIENT)
Dept: ONCOLOGY | Age: 54
End: 2024-10-22

## 2024-10-22 VITALS
SYSTOLIC BLOOD PRESSURE: 144 MMHG | RESPIRATION RATE: 18 BRPM | WEIGHT: 215.4 LBS | HEART RATE: 84 BPM | TEMPERATURE: 98 F | DIASTOLIC BLOOD PRESSURE: 88 MMHG | HEIGHT: 69 IN | OXYGEN SATURATION: 99 % | BODY MASS INDEX: 31.9 KG/M2

## 2024-10-22 DIAGNOSIS — I87.009 POSTTHROMBOTIC SYNDROME: ICD-10-CM

## 2024-10-22 DIAGNOSIS — I82.499 DEEP VEIN THROMBOSIS (DVT) OF OTHER VEIN OF LOWER EXTREMITY, UNSPECIFIED CHRONICITY, UNSPECIFIED LATERALITY (HCC): ICD-10-CM

## 2024-10-22 DIAGNOSIS — I82.409 RECURRENT DEEP VEIN THROMBOSIS (DVT) (HCC): Primary | ICD-10-CM

## 2024-10-22 LAB
ALBUMIN SERPL-MCNC: 3.2 G/DL (ref 3.5–5)
ALBUMIN/GLOB SERPL: 0.7 (ref 1–1.9)
ALP SERPL-CCNC: 151 U/L (ref 35–104)
ALT SERPL-CCNC: 10 U/L (ref 8–45)
ANION GAP SERPL CALC-SCNC: 11 MMOL/L (ref 9–18)
AST SERPL-CCNC: 19 U/L (ref 15–37)
BASOPHILS # BLD: 0.1 K/UL (ref 0–0.2)
BASOPHILS NFR BLD: 1 % (ref 0–2)
BILIRUB SERPL-MCNC: 0.2 MG/DL (ref 0–1.2)
BUN SERPL-MCNC: 17 MG/DL (ref 6–23)
CALCIUM SERPL-MCNC: 8.9 MG/DL (ref 8.8–10.2)
CHLORIDE SERPL-SCNC: 106 MMOL/L (ref 98–107)
CO2 SERPL-SCNC: 25 MMOL/L (ref 20–28)
CREAT SERPL-MCNC: 0.97 MG/DL (ref 0.6–1.1)
D DIMER PPP FEU-MCNC: 3.09 UG/ML(FEU)
DIFFERENTIAL METHOD BLD: ABNORMAL
EOSINOPHIL # BLD: 0.2 K/UL (ref 0–0.8)
EOSINOPHIL NFR BLD: 3 % (ref 0.5–7.8)
ERYTHROCYTE [DISTWIDTH] IN BLOOD BY AUTOMATED COUNT: 14.3 % (ref 11.9–14.6)
GLOBULIN SER CALC-MCNC: 4.8 G/DL (ref 2.3–3.5)
GLUCOSE SERPL-MCNC: 74 MG/DL (ref 70–99)
HCT VFR BLD AUTO: 36 % (ref 35.8–46.3)
HGB BLD-MCNC: 11.3 G/DL (ref 11.7–15.4)
IMM GRANULOCYTES # BLD AUTO: 0.1 K/UL (ref 0–0.5)
IMM GRANULOCYTES NFR BLD AUTO: 1 % (ref 0–5)
LYMPHOCYTES # BLD: 1.7 K/UL (ref 0.5–4.6)
LYMPHOCYTES NFR BLD: 30 % (ref 13–44)
MCH RBC QN AUTO: 27.6 PG (ref 26.1–32.9)
MCHC RBC AUTO-ENTMCNC: 31.4 G/DL (ref 31.4–35)
MCV RBC AUTO: 87.8 FL (ref 82–102)
MONOCYTES # BLD: 0.5 K/UL (ref 0.1–1.3)
MONOCYTES NFR BLD: 8 % (ref 4–12)
NEUTS SEG # BLD: 3.2 K/UL (ref 1.7–8.2)
NEUTS SEG NFR BLD: 57 % (ref 43–78)
NRBC # BLD: 0 K/UL (ref 0–0.2)
PLATELET # BLD AUTO: 479 K/UL (ref 150–450)
PMV BLD AUTO: 8.8 FL (ref 9.4–12.3)
POTASSIUM SERPL-SCNC: 3.9 MMOL/L (ref 3.5–5.1)
PROT SERPL-MCNC: 7.9 G/DL (ref 6.3–8.2)
RBC # BLD AUTO: 4.1 M/UL (ref 4.05–5.2)
SODIUM SERPL-SCNC: 142 MMOL/L (ref 136–145)
WBC # BLD AUTO: 5.7 K/UL (ref 4.3–11.1)

## 2024-10-22 PROCEDURE — 36415 COLL VENOUS BLD VENIPUNCTURE: CPT

## 2024-10-22 PROCEDURE — 85379 FIBRIN DEGRADATION QUANT: CPT

## 2024-10-22 PROCEDURE — 85025 COMPLETE CBC W/AUTO DIFF WBC: CPT

## 2024-10-22 PROCEDURE — 80053 COMPREHEN METABOLIC PANEL: CPT

## 2024-10-22 ASSESSMENT — PATIENT HEALTH QUESTIONNAIRE - PHQ9
SUM OF ALL RESPONSES TO PHQ QUESTIONS 1-9: 1
SUM OF ALL RESPONSES TO PHQ QUESTIONS 1-9: 1
1. LITTLE INTEREST OR PLEASURE IN DOING THINGS: NOT AT ALL
SUM OF ALL RESPONSES TO PHQ QUESTIONS 1-9: 1
2. FEELING DOWN, DEPRESSED OR HOPELESS: SEVERAL DAYS
SUM OF ALL RESPONSES TO PHQ9 QUESTIONS 1 & 2: 1
SUM OF ALL RESPONSES TO PHQ QUESTIONS 1-9: 1

## 2024-10-22 NOTE — PATIENT INSTRUCTIONS
Patient Information from Today's Visit    The members of your [facility] are listed below:    Physician Provider: Reece Arevalo Medical Oncologist  Advanced Practice Clinician: Carol New NP  Registered Nurse: Sandi MUNOZ   Navigator:   Medical Assistant: Cherelle VYAS MA  : Merry RUELAS   Supportive Care Services: Kelley IBANEZ LMSW    Diagnosis: hx dvt      Follow Up Instructions:   Reviewed labs  Reviewed meds  Due for colonoscopy   Wear compression socks  Continue eliquis   Treatment Summary has been discussed and given to patient:      Current Labs:   Hospital Outpatient Visit on 10/22/2024   Component Date Value Ref Range Status    WBC 10/22/2024 5.7  4.3 - 11.1 K/uL Final    RBC 10/22/2024 4.10  4.05 - 5.2 M/uL Final    Hemoglobin 10/22/2024 11.3 (L)  11.7 - 15.4 g/dL Final    Hematocrit 10/22/2024 36.0  35.8 - 46.3 % Final    MCV 10/22/2024 87.8  82.0 - 102.0 FL Final    MCH 10/22/2024 27.6  26.1 - 32.9 PG Final    MCHC 10/22/2024 31.4  31.4 - 35.0 g/dL Final    RDW 10/22/2024 14.3  11.9 - 14.6 % Final    Platelets 10/22/2024 479 (H)  150 - 450 K/uL Final    MPV 10/22/2024 8.8 (L)  9.4 - 12.3 FL Final    nRBC 10/22/2024 0.00  0.0 - 0.2 K/uL Final    **Note: Absolute NRBC parameter is now reported with Hemogram**    Neutrophils % 10/22/2024 57  43 - 78 % Final    Lymphocytes % 10/22/2024 30  13 - 44 % Final    Monocytes % 10/22/2024 8  4.0 - 12.0 % Final    Eosinophils % 10/22/2024 3  0.5 - 7.8 % Final    Basophils % 10/22/2024 1  0.0 - 2.0 % Final    Immature Granulocytes % 10/22/2024 1  0.0 - 5.0 % Final    Neutrophils Absolute 10/22/2024 3.2  1.7 - 8.2 K/UL Final    Lymphocytes Absolute 10/22/2024 1.7  0.5 - 4.6 K/UL Final    Monocytes Absolute 10/22/2024 0.5  0.1 - 1.3 K/UL Final    Eosinophils Absolute 10/22/2024 0.2  0.0 - 0.8 K/UL Final    Basophils Absolute 10/22/2024 0.1  0.0 - 0.2 K/UL Final    Immature Granulocytes Absolute 10/22/2024 0.1  0.0 - 0.5 K/UL Final    Differential Type

## 2024-10-22 NOTE — PROGRESS NOTES
[facility]: Office Visit New Patient H/P    Chief Complaint:    Recurrent DVT    History of Present Illness:  Referral Diagnosis:  Recurrent acute deep vein thrombosis (DVT) of both lower extremities      Referring Provider:  Ronak Ware PA     Primary Care Provider: Brooke Kitchen PA     Family History of Cancer/ Hematology Disorders: No known family history     Presenting Symptoms: Recurrent acute deep vein thrombosis (DVT) of both lower extremities      54 y.o. black female     3/15/21 - CT OF THE CHEST WITH INTRAVENOUS CONTRAST (EPIC)  IMPRESSION:  1.  Bilateral pulmonary emboli with emboli extending from the distal left main  pulmonary artery into arteries of the left upper and lower lobes and additional  more distal emboli in the right upper and lower lobes.   2. Left upper lobe airspace changes and small dependent left pleural effusion  likely representing reactive changes from pulmonary emboli. Specifically,  airspace changes in the left upper lobe could represent a developing infarction.  3. Prominent left axillary lymph nodes. These are not clearly dysmorphic and  favored to be reactive. Specifically, this can be seen in the setting of a  recent vaccine.     3/16/21 - PROCEDURE: Pulmonary angiography and placement of thrombolysis infusion catheter with internal ultrasound wire (EPIC)     3/16/21 - BILATERAL LOWER EXTREMITY DOPPLER ULTRASOUND (EPIC)   IMPRESSION:  DVT in the left leg present as described.     3/17/21 - IR INF THROMB ART/KARIN SUBSQ DAY W CATH REMOVAL & CLOSURE  IMPRESSION:  Diagnostic venography demonstrates nonocclusive thrombus in the apex of the Cook Celect IVC filter.  One of the stabilizing struts appears to be fractured and is located at the level of L2.  Widely patent right common femoral, external iliac, and common iliac veins; widely patent left common femoral and external iliac veins; narrowed proximal left common iliac vein with collateral

## 2024-11-18 DIAGNOSIS — F51.01 PRIMARY INSOMNIA: ICD-10-CM

## 2024-11-18 RX ORDER — ZOLPIDEM TARTRATE 10 MG/1
10 TABLET ORAL NIGHTLY PRN
Qty: 30 TABLET | Refills: 0 | Status: SHIPPED | OUTPATIENT
Start: 2024-11-18 | End: 2024-12-18

## 2024-11-18 NOTE — TELEPHONE ENCOUNTER
I sent the requested medication/product in. Please let patient know to check with their pharmacy  Thanks  Marion LOU

## 2024-11-18 NOTE — TELEPHONE ENCOUNTER
NEEDING:  - Zolpidem (AMBIEN) 10 mg 1 tab once at bedtime/RAN OUT/30 days supply/[facility] at [address]/[phone]    Next Appointment:  2/24/25

## 2025-04-08 ENCOUNTER — OFFICE VISIT (OUTPATIENT)
Dept: FAMILY MEDICINE CLINIC | Facility: CLINIC | Age: 55
End: 2025-04-08
Payer: COMMERCIAL

## 2025-04-08 VITALS
OXYGEN SATURATION: 99 % | SYSTOLIC BLOOD PRESSURE: 130 MMHG | TEMPERATURE: 97.4 F | WEIGHT: 216 LBS | DIASTOLIC BLOOD PRESSURE: 75 MMHG | HEIGHT: 69 IN | HEART RATE: 97 BPM | BODY MASS INDEX: 31.99 KG/M2

## 2025-04-08 DIAGNOSIS — G89.29 CHRONIC PAIN OF BOTH KNEES: ICD-10-CM

## 2025-04-08 DIAGNOSIS — F51.01 PRIMARY INSOMNIA: ICD-10-CM

## 2025-04-08 DIAGNOSIS — M25.562 CHRONIC PAIN OF BOTH KNEES: ICD-10-CM

## 2025-04-08 DIAGNOSIS — K21.9 GASTROESOPHAGEAL REFLUX DISEASE WITHOUT ESOPHAGITIS: ICD-10-CM

## 2025-04-08 DIAGNOSIS — I10 PRIMARY HYPERTENSION: ICD-10-CM

## 2025-04-08 DIAGNOSIS — M25.561 CHRONIC PAIN OF BOTH KNEES: ICD-10-CM

## 2025-04-08 DIAGNOSIS — I82.409 RECURRENT DEEP VEIN THROMBOSIS (DVT) (HCC): ICD-10-CM

## 2025-04-08 LAB
ALBUMIN SERPL-MCNC: 3.1 G/DL (ref 3.5–5)
ALBUMIN/GLOB SERPL: 0.8 (ref 1–1.9)
ALP SERPL-CCNC: 121 U/L (ref 35–104)
ALT SERPL-CCNC: 14 U/L (ref 8–45)
ANION GAP SERPL CALC-SCNC: 11 MMOL/L (ref 7–16)
AST SERPL-CCNC: 20 U/L (ref 15–37)
BASOPHILS # BLD: 0.04 K/UL (ref 0–0.2)
BASOPHILS NFR BLD: 0.7 % (ref 0–2)
BILIRUB SERPL-MCNC: 0.3 MG/DL (ref 0–1.2)
BUN SERPL-MCNC: 18 MG/DL (ref 6–23)
CALCIUM SERPL-MCNC: 9.3 MG/DL (ref 8.8–10.2)
CHLORIDE SERPL-SCNC: 106 MMOL/L (ref 98–107)
CHOLEST SERPL-MCNC: 247 MG/DL (ref 0–200)
CO2 SERPL-SCNC: 23 MMOL/L (ref 20–29)
CREAT SERPL-MCNC: 0.82 MG/DL (ref 0.6–1.1)
DIFFERENTIAL METHOD BLD: ABNORMAL
EOSINOPHIL # BLD: 0.15 K/UL (ref 0–0.8)
EOSINOPHIL NFR BLD: 2.5 % (ref 0.5–7.8)
ERYTHROCYTE [DISTWIDTH] IN BLOOD BY AUTOMATED COUNT: 15.2 % (ref 11.9–14.6)
EST. AVERAGE GLUCOSE BLD GHB EST-MCNC: 118 MG/DL
GLOBULIN SER CALC-MCNC: 3.8 G/DL (ref 2.3–3.5)
GLUCOSE SERPL-MCNC: 93 MG/DL (ref 70–99)
HBA1C MFR BLD: 5.7 % (ref 0–5.6)
HCT VFR BLD AUTO: 34.1 % (ref 35.8–46.3)
HDLC SERPL-MCNC: 114 MG/DL (ref 40–60)
HDLC SERPL: 2.2 (ref 0–5)
HGB BLD-MCNC: 11 G/DL (ref 11.7–15.4)
IMM GRANULOCYTES # BLD AUTO: 0.01 K/UL (ref 0–0.5)
IMM GRANULOCYTES NFR BLD AUTO: 0.2 % (ref 0–5)
LDLC SERPL CALC-MCNC: 109 MG/DL (ref 0–100)
LYMPHOCYTES # BLD: 1.83 K/UL (ref 0.5–4.6)
LYMPHOCYTES NFR BLD: 30.8 % (ref 13–44)
MCH RBC QN AUTO: 28 PG (ref 26.1–32.9)
MCHC RBC AUTO-ENTMCNC: 32.3 G/DL (ref 31.4–35)
MCV RBC AUTO: 86.8 FL (ref 82–102)
MONOCYTES # BLD: 0.32 K/UL (ref 0.1–1.3)
MONOCYTES NFR BLD: 5.4 % (ref 4–12)
NEUTS SEG # BLD: 3.59 K/UL (ref 1.7–8.2)
NEUTS SEG NFR BLD: 60.4 % (ref 43–78)
NRBC # BLD: 0 K/UL (ref 0–0.2)
PLATELET # BLD AUTO: 325 K/UL (ref 150–450)
PMV BLD AUTO: 9.9 FL (ref 9.4–12.3)
POTASSIUM SERPL-SCNC: 4 MMOL/L (ref 3.5–5.1)
PROT SERPL-MCNC: 6.9 G/DL (ref 6.3–8.2)
RBC # BLD AUTO: 3.93 M/UL (ref 4.05–5.2)
SODIUM SERPL-SCNC: 141 MMOL/L (ref 136–145)
TRIGL SERPL-MCNC: 120 MG/DL (ref 0–150)
VLDLC SERPL CALC-MCNC: 24 MG/DL (ref 6–23)
WBC # BLD AUTO: 5.9 K/UL (ref 4.3–11.1)

## 2025-04-08 PROCEDURE — 99214 OFFICE O/P EST MOD 30 MIN: CPT | Performed by: PHYSICIAN ASSISTANT

## 2025-04-08 PROCEDURE — 3075F SYST BP GE 130 - 139MM HG: CPT | Performed by: PHYSICIAN ASSISTANT

## 2025-04-08 PROCEDURE — 3078F DIAST BP <80 MM HG: CPT | Performed by: PHYSICIAN ASSISTANT

## 2025-04-08 RX ORDER — ZOLPIDEM TARTRATE 10 MG/1
10 TABLET ORAL NIGHTLY PRN
Qty: 30 TABLET | Refills: 0 | Status: CANCELLED | OUTPATIENT
Start: 2025-04-08 | End: 2025-05-08

## 2025-04-08 ASSESSMENT — PATIENT HEALTH QUESTIONNAIRE - PHQ9
10. IF YOU CHECKED OFF ANY PROBLEMS, HOW DIFFICULT HAVE THESE PROBLEMS MADE IT FOR YOU TO DO YOUR WORK, TAKE CARE OF THINGS AT HOME, OR GET ALONG WITH OTHER PEOPLE: SOMEWHAT DIFFICULT
2. FEELING DOWN, DEPRESSED OR HOPELESS: SEVERAL DAYS
7. TROUBLE CONCENTRATING ON THINGS, SUCH AS READING THE NEWSPAPER OR WATCHING TELEVISION: NOT AT ALL
SUM OF ALL RESPONSES TO PHQ QUESTIONS 1-9: 3
6. FEELING BAD ABOUT YOURSELF - OR THAT YOU ARE A FAILURE OR HAVE LET YOURSELF OR YOUR FAMILY DOWN: NOT AT ALL
SUM OF ALL RESPONSES TO PHQ QUESTIONS 1-9: 3
9. THOUGHTS THAT YOU WOULD BE BETTER OFF DEAD, OR OF HURTING YOURSELF: NOT AT ALL
8. MOVING OR SPEAKING SO SLOWLY THAT OTHER PEOPLE COULD HAVE NOTICED. OR THE OPPOSITE, BEING SO FIGETY OR RESTLESS THAT YOU HAVE BEEN MOVING AROUND A LOT MORE THAN USUAL: NOT AT ALL
1. LITTLE INTEREST OR PLEASURE IN DOING THINGS: SEVERAL DAYS
SUM OF ALL RESPONSES TO PHQ QUESTIONS 1-9: 3
3. TROUBLE FALLING OR STAYING ASLEEP: SEVERAL DAYS
SUM OF ALL RESPONSES TO PHQ QUESTIONS 1-9: 3
4. FEELING TIRED OR HAVING LITTLE ENERGY: NOT AT ALL
5. POOR APPETITE OR OVEREATING: NOT AT ALL

## 2025-04-08 NOTE — PROGRESS NOTES
General: She is not in acute distress.     Appearance: Normal appearance.   HENT:      Head: Normocephalic and atraumatic.      Nose: Nose normal.      Mouth/Throat:      Mouth: Mucous membranes are moist.      Pharynx: Oropharynx is clear. No oropharyngeal exudate.   Eyes:      Extraocular Movements: Extraocular movements intact.      Conjunctiva/sclera: Conjunctivae normal.      Pupils: Pupils are equal, round, and reactive to light.   Cardiovascular:      Rate and Rhythm: Normal rate and regular rhythm.      Heart sounds: No murmur heard.     No friction rub. No gallop.   Pulmonary:      Effort: Pulmonary effort is normal.      Breath sounds: No wheezing, rhonchi or rales.   Abdominal:      Palpations: Abdomen is soft.      Tenderness: There is no abdominal tenderness.   Musculoskeletal:      Cervical back: Normal range of motion.   Skin:     General: Skin is warm and dry.      Capillary Refill: Capillary refill takes less than 2 seconds.   Neurological:      General: No focal deficit present.      Mental Status: She is alert and oriented to person, place, and time.      Sensory: No sensory deficit.      Motor: No weakness.      Gait: Gait normal.   Psychiatric:         Mood and Affect: Mood normal.         Thought Content: Thought content normal.         Judgment: Judgment normal.                  An electronic signature was used to authenticate this note.    --[facility] has been out for a couple of months after losing insurance    Wants to see pain management

## 2025-04-11 ENCOUNTER — RESULTS FOLLOW-UP (OUTPATIENT)
Dept: FAMILY MEDICINE CLINIC | Facility: CLINIC | Age: 55
End: 2025-04-11

## 2025-04-11 ASSESSMENT — ENCOUNTER SYMPTOMS
ABDOMINAL PAIN: 0
SHORTNESS OF BREATH: 0

## 2025-04-15 ENCOUNTER — TELEPHONE (OUTPATIENT)
Dept: FAMILY MEDICINE CLINIC | Facility: CLINIC | Age: 55
End: 2025-04-15

## 2025-04-15 NOTE — TELEPHONE ENCOUNTER
Pt came in person and requested a refill for the following meds\"        zolpidem (AMBIEN) 10 MG tablet [[id number]]     Muscle relaxer     [id number] [place] [id number] - P [phone] - F [phone]

## 2025-04-16 DIAGNOSIS — I82.409 RECURRENT DEEP VEIN THROMBOSIS (DVT) (HCC): Primary | ICD-10-CM

## 2025-04-22 ENCOUNTER — TELEPHONE (OUTPATIENT)
Dept: FAMILY MEDICINE CLINIC | Facility: CLINIC | Age: 55
End: 2025-04-22

## 2025-04-22 NOTE — TELEPHONE ENCOUNTER
Pt came in person and requested a refill for the following meds\"           zolpidem (AMBIEN) 10 MG tablet [[id number]]      Muscle relaxer      [facility] #[id number] [place] [id number] - P [phone] - F [phone]

## 2025-04-23 DIAGNOSIS — M54.2 NECK PAIN: ICD-10-CM

## 2025-04-23 DIAGNOSIS — F51.01 PRIMARY INSOMNIA: Primary | ICD-10-CM

## 2025-04-23 RX ORDER — ZOLPIDEM TARTRATE 10 MG/1
10 TABLET ORAL NIGHTLY PRN
Qty: 30 TABLET | Refills: 0 | Status: SHIPPED | OUTPATIENT
Start: 2025-04-23 | End: 2025-05-23

## 2025-04-23 NOTE — PROGRESS NOTES
I have reviewed the patient’s controlled substance prescription history, as maintained in the [place] prescription monitoring program, so that the prescription(s) for a  controlled substance can be given.  - Patient is aware of addictive potential of medication.   - Patient is aware of respiratory suppression and is not experiencing any sedation with medication.   - Patient denies sedation or other side effects from medication  - Patient is instructed on compliance with dosing schedule and acknowledges that no early refill will be provided in the event of non-compliance, theft or loss of medication.   - Patient is aware that they may be subject to random drug testing and pill counts.

## 2025-05-30 ENCOUNTER — HOSPITAL ENCOUNTER (EMERGENCY)
Age: 55
Discharge: HOME | End: 2025-05-30
Attending: EMERGENCY MEDICINE
Payer: COMMERCIAL

## 2025-05-30 VITALS
OXYGEN SATURATION: 100 % | RESPIRATION RATE: 18 BRPM | WEIGHT: 204 LBS | HEART RATE: 88 BPM | DIASTOLIC BLOOD PRESSURE: 86 MMHG | SYSTOLIC BLOOD PRESSURE: 134 MMHG | HEIGHT: 69 IN | TEMPERATURE: 97.7 F | BODY MASS INDEX: 30.21 KG/M2

## 2025-05-30 DIAGNOSIS — H20.9 UVEITIS: Primary | ICD-10-CM

## 2025-05-30 PROCEDURE — 6370000000 HC RX 637 (ALT 250 FOR IP): Performed by: EMERGENCY MEDICINE

## 2025-05-30 PROCEDURE — 99283 EMERGENCY DEPT VISIT LOW MDM: CPT

## 2025-05-30 RX ORDER — MOXIFLOXACIN 5 MG/ML
1 SOLUTION/ DROPS OPHTHALMIC 4 TIMES DAILY
Qty: 3 ML | Refills: 0 | Status: SHIPPED | OUTPATIENT
Start: 2025-05-30 | End: 2025-06-06

## 2025-05-30 RX ORDER — HYDROCODONE BITARTRATE AND ACETAMINOPHEN 5; 325 MG/1; MG/1
1 TABLET ORAL
Refills: 0 | Status: COMPLETED | OUTPATIENT
Start: 2025-05-30 | End: 2025-05-30

## 2025-05-30 RX ORDER — VALACYCLOVIR HYDROCHLORIDE 1 G/1
1000 TABLET, FILM COATED ORAL 3 TIMES DAILY
Qty: 21 TABLET | Refills: 0 | Status: SHIPPED | OUTPATIENT
Start: 2025-05-30 | End: 2025-06-06

## 2025-05-30 RX ORDER — HYDROCODONE BITARTRATE AND ACETAMINOPHEN 7.5; 325 MG/1; MG/1
1 TABLET ORAL EVERY 6 HOURS PRN
Qty: 12 TABLET | Refills: 0 | Status: SHIPPED | OUTPATIENT
Start: 2025-05-30 | End: 2025-06-02

## 2025-05-30 RX ORDER — CYCLOPENTOLATE HYDROCHLORIDE 10 MG/ML
1 SOLUTION/ DROPS OPHTHALMIC 2 TIMES DAILY
Qty: 0.7 ML | Refills: 0 | Status: SHIPPED | OUTPATIENT
Start: 2025-05-30 | End: 2025-06-06

## 2025-05-30 RX ORDER — TETRACAINE HYDROCHLORIDE 5 MG/ML
2 SOLUTION OPHTHALMIC
Status: COMPLETED | OUTPATIENT
Start: 2025-05-30 | End: 2025-05-30

## 2025-05-30 RX ADMIN — HYDROCODONE BITARTRATE AND ACETAMINOPHEN 1 TABLET: 5; 325 TABLET ORAL at 19:48

## 2025-05-30 RX ADMIN — FLUORESCEIN SODIUM 1 MG: 1 STRIP OPHTHALMIC at 19:49

## 2025-05-30 RX ADMIN — TETRACAINE HYDROCHLORIDE 2 DROP: 5 SOLUTION OPHTHALMIC at 19:49

## 2025-05-30 ASSESSMENT — PAIN SCALES - GENERAL
PAINLEVEL_OUTOF10: 10
PAINLEVEL_OUTOF10: 10
PAINLEVEL_OUTOF10: 8

## 2025-05-30 ASSESSMENT — ENCOUNTER SYMPTOMS
EYE REDNESS: 1
SINUS PRESSURE: 0
EYE PAIN: 1
PHOTOPHOBIA: 1
EYE DISCHARGE: 0
EYE ITCHING: 0

## 2025-05-30 ASSESSMENT — PAIN DESCRIPTION - LOCATION
LOCATION: EYE

## 2025-05-30 ASSESSMENT — PAIN DESCRIPTION - ORIENTATION
ORIENTATION: LEFT

## 2025-05-30 ASSESSMENT — PAIN DESCRIPTION - DESCRIPTORS: DESCRIPTORS: SORE

## 2025-05-30 ASSESSMENT — PAIN - FUNCTIONAL ASSESSMENT: PAIN_FUNCTIONAL_ASSESSMENT: 0-10

## 2025-05-30 NOTE — ED PROVIDER NOTES
[facility] Provider Note                   PCP:                Brooke Kitchen PA               Age: 55 y.o.      Sex: female     MEDICAL DECISION MAKING  Complexity of Problems Addressed:   1 or more acute illness/injury that poses a threat to life or bodily function    Data Reviewed and Analyzed:  Category 1:    I have reviewed outside records from an external source for any pertinent PMH, ED visits, primary care visits, specialist visits, labs, EKG, and/or radiologic studies.  I reviewed external records: provider visit note from PCP.     Category 2:     I considered ordering labs but did not because I did not feel it would  of this patient.     I considered ordering radiologic studies but did not because I did not feel it would  of this patient.     Prescription drug management performed.  Patient was discharged risks and benefits of hospitalization were considered.  Discussion with external consultants.        Category 3:     Discussion of management or test interpretation:    MDM  Number of Diagnoses or Management Options  Uveitis  Diagnosis management comments: Patient presented for evaluation of left eye pain, redness, and photophobia.  There was no recent injury to the eye.  Fluorescein staining was negative for uptake.  Patient was given Norco and tetracaine drops.  She was to light sensitive to obtain visual acuity or intraocular pressure.  I did consult with ophthalmologist who feels this is likely uveitis.  He recommended prescribing cyclopentolate drops, moxifloxacin drops, and Valtrex orally.  He is going to see him tomorrow in his office.          Franko Dalal is a 55 y.o. female who presents to the Emergency Department with chief complaint of    Chief Complaint   Patient presents with    Eye Pain      Patient states that yesterday she developed some mild pain to her left eye.  She states today the pain became severe and now she is sensitive  the patient and/or their family/caregiver (if present).  The patient/caregiver verbalized understanding and compliance with the treatment plan.  Strict emergency department return precautions were given.  All questions and concerns were answered.      ICD-10-CM    1. Uveitis  H20.9 HYDROcodone-acetaminophen (NORCO) 7.5-325 MG per tablet          DISPOSITION Decision To Discharge 05/30/2025 08:53:59 PM   DISPOSITION CONDITION Stable            Voice dictation software was used during the making of this note.  This software is not perfect and grammatical and other typographical errors may be present.  This note has not been completely proofread for errors.     Tim Aranda MD  05/30/25 [id number]

## 2025-05-30 NOTE — ED NOTES
Unable to obtain visual acuity screening due to light sensitivity, provider ELZBIETA Aranda notified.      Rylee Caba, CHARISSE  05/30/25 1957

## 2025-05-30 NOTE — ED TRIAGE NOTES
Patient arrived with a complaint of pain behind left eye pian. Sensitive to light and red. Started yesterday

## 2025-06-19 ENCOUNTER — OFFICE VISIT (OUTPATIENT)
Dept: FAMILY MEDICINE CLINIC | Facility: CLINIC | Age: 55
End: 2025-06-19
Payer: COMMERCIAL

## 2025-06-19 VITALS
TEMPERATURE: 98.1 F | DIASTOLIC BLOOD PRESSURE: 98 MMHG | HEART RATE: 89 BPM | SYSTOLIC BLOOD PRESSURE: 154 MMHG | WEIGHT: 219.4 LBS | BODY MASS INDEX: 32.4 KG/M2 | OXYGEN SATURATION: 98 %

## 2025-06-19 DIAGNOSIS — G89.29 CHRONIC PAIN OF LEFT KNEE: Primary | ICD-10-CM

## 2025-06-19 DIAGNOSIS — M54.50 ACUTE BILATERAL LOW BACK PAIN WITHOUT SCIATICA: ICD-10-CM

## 2025-06-19 DIAGNOSIS — M54.6 ACUTE BILATERAL THORACIC BACK PAIN: ICD-10-CM

## 2025-06-19 DIAGNOSIS — M25.562 CHRONIC PAIN OF LEFT KNEE: Primary | ICD-10-CM

## 2025-06-19 DIAGNOSIS — F51.01 PRIMARY INSOMNIA: ICD-10-CM

## 2025-06-19 LAB
BILIRUBIN, URINE, POC: ABNORMAL
BLOOD URINE, POC: NEGATIVE
GLUCOSE URINE, POC: ABNORMAL
KETONES, URINE, POC: ABNORMAL
LEUKOCYTE ESTERASE, URINE, POC: ABNORMAL
NITRITE, URINE, POC: NEGATIVE
PH, URINE, POC: 6 (ref 4.6–8)
PROTEIN,URINE, POC: ABNORMAL
SPECIFIC GRAVITY, URINE, POC: 1.02 (ref 1–1.03)
URINALYSIS CLARITY, POC: CLEAR
URINALYSIS COLOR, POC: YELLOW
UROBILINOGEN, POC: NORMAL

## 2025-06-19 PROCEDURE — 99214 OFFICE O/P EST MOD 30 MIN: CPT | Performed by: PHYSICIAN ASSISTANT

## 2025-06-19 PROCEDURE — 81003 URINALYSIS AUTO W/O SCOPE: CPT | Performed by: PHYSICIAN ASSISTANT

## 2025-06-19 PROCEDURE — 3077F SYST BP >= 140 MM HG: CPT | Performed by: PHYSICIAN ASSISTANT

## 2025-06-19 PROCEDURE — 3080F DIAST BP >= 90 MM HG: CPT | Performed by: PHYSICIAN ASSISTANT

## 2025-06-19 RX ORDER — ZOLPIDEM TARTRATE 10 MG/1
10 TABLET ORAL NIGHTLY PRN
Qty: 30 TABLET | Refills: 0 | Status: SHIPPED | OUTPATIENT
Start: 2025-06-19 | End: 2025-07-19

## 2025-06-19 ASSESSMENT — PATIENT HEALTH QUESTIONNAIRE - PHQ9
1. LITTLE INTEREST OR PLEASURE IN DOING THINGS: NOT AT ALL
SUM OF ALL RESPONSES TO PHQ QUESTIONS 1-9: 0
2. FEELING DOWN, DEPRESSED OR HOPELESS: NOT AT ALL
SUM OF ALL RESPONSES TO PHQ QUESTIONS 1-9: 0

## 2025-06-19 NOTE — PROGRESS NOTES
(DVT) of lower extremity (HCC)    HTN (hypertension)    Morbid obesity (HCC)    SAMSON (obstructive sleep apnea)    Bilateral pulmonary embolism (HCC)    Schizophrenia (HCC)    Chest pain    History of venous thromboembolism    Obesity (BMI 30.0-34.9)    Chronic pain of both knees    Elevated hemoglobin A1c    Primary insomnia       Social History:   Social History     Tobacco Use    Smoking status: Former     Current packs/day: 0.00     Types: Cigarettes     Quit date: 6/3/2018     Years since quittin.0     Passive exposure: Past    Smokeless tobacco: Never    Tobacco comments:     Quit smoking: current social smoker (noted 16) Per Patient smoked on and off  started smoking in her 20's   Substance Use Topics    Alcohol use: Yes     Comment: once a month       Family History:   Family History   Problem Relation Age of Onset    Cancer Father     Breast Cancer Neg Hx        Surgical History:  Past Surgical History:   Procedure Laterality Date    BREAST REDUCTION SURGERY      COLONOSCOPY N/A 3/7/2018    COLONOSCOPY  BMI 39 performed by Santi Estevez MD at [facility] ENDOSCOPY    GASTRECTOMY  3/4/13    Sleeve    HYSTERECTOMY (CERVIX STATUS UNKNOWN)      IR GUIDED IVC FILTER RETRIEVAL  2021    IR GUIDED IVC FILTER RETRIEVAL  2021    IR IVC FILTER RETRIEVAL 2021 [facility] RADIOLOGY SPECIALS    IR INF THROMB ART/KARIN SUBSQ DAY W CATH REMOVAL & CLOSURE  3/17/2021    IR TRANSCATH INFUSION THROMB NONCORONARY  3/16/2021    IVC FILTER REMOVAL  2022    OVARY REMOVAL      TUBAL LIGATION      VASCULAR SURGERY      IVF PLACED       ROS  Review of Systems   Constitutional:  Negative for fever.   Eyes:  Negative for visual disturbance.   Respiratory:  Negative for cough and shortness of breath.    Cardiovascular:  Negative for chest pain.   Neurological:  Negative for syncope.       BP (!) 154/98   Pulse 89   Temp 98.1 °F (36.7 °C)   Wt 99.5 kg (219 lb 6.4 oz)   SpO2 98%   BMI 32.40 kg/m²   Body mass index is

## 2025-06-20 ENCOUNTER — TELEPHONE (OUTPATIENT)
Dept: FAMILY MEDICINE CLINIC | Facility: CLINIC | Age: 55
End: 2025-06-20

## 2025-06-20 DIAGNOSIS — M54.50 ACUTE BILATERAL LOW BACK PAIN WITHOUT SCIATICA: Primary | ICD-10-CM

## 2025-06-20 DIAGNOSIS — M54.6 ACUTE BILATERAL THORACIC BACK PAIN: ICD-10-CM

## 2025-06-20 DIAGNOSIS — M25.562 CHRONIC PAIN OF LEFT KNEE: ICD-10-CM

## 2025-06-20 DIAGNOSIS — G89.29 CHRONIC PAIN OF LEFT KNEE: ICD-10-CM

## 2025-06-20 NOTE — TELEPHONE ENCOUNTER
Pt called and stated that she went to the pharmacy and were able to  2 medications but didn't received her pain medication.    Please advised.

## 2025-06-23 NOTE — TELEPHONE ENCOUNTER
Patient had zanaflex sent in but if it was not received I can send it in again and offered referral to pain management if she feels that is not helping enough with the pain

## 2025-06-29 RX ORDER — TRAMADOL HYDROCHLORIDE 50 MG/1
50 TABLET ORAL EVERY 12 HOURS PRN
Qty: 28 TABLET | Refills: 0 | Status: SHIPPED | OUTPATIENT
Start: 2025-06-29 | End: 2025-07-13

## 2025-07-07 DIAGNOSIS — I10 PRIMARY HYPERTENSION: ICD-10-CM

## 2025-07-07 RX ORDER — DILTIAZEM HYDROCHLORIDE 240 MG/1
240 CAPSULE, EXTENDED RELEASE ORAL DAILY
Qty: 90 CAPSULE | Refills: 1 | Status: SHIPPED | OUTPATIENT
Start: 2025-07-07

## 2025-07-08 ENCOUNTER — TELEPHONE (OUTPATIENT)
Dept: FAMILY MEDICINE CLINIC | Facility: CLINIC | Age: 55
End: 2025-07-08

## 2025-07-08 NOTE — TELEPHONE ENCOUNTER
[facility] is stating is delayed   omeprazole (PRILOSEC) 40 MG delayed release capsule   Because it needs approval       dilTIAZem (TIAZAC) 240 MG extended release capsule   States it is in progress but only partial pills are available        [id number] [place], [place] - [address]       Pt states she would like to know she can be prescribed   traMADol (ULTRAM) 50 MG tablet     And   tiZANidine (ZANAFLEX) 4 MG tablet

## 2025-07-09 DIAGNOSIS — K21.9 GASTROESOPHAGEAL REFLUX DISEASE WITHOUT ESOPHAGITIS: ICD-10-CM

## 2025-07-09 DIAGNOSIS — M25.562 CHRONIC PAIN OF LEFT KNEE: ICD-10-CM

## 2025-07-09 DIAGNOSIS — G89.29 CHRONIC PAIN OF LEFT KNEE: ICD-10-CM

## 2025-07-09 DIAGNOSIS — M54.50 ACUTE BILATERAL LOW BACK PAIN WITHOUT SCIATICA: Primary | ICD-10-CM

## 2025-07-09 RX ORDER — OMEPRAZOLE 40 MG/1
40 CAPSULE, DELAYED RELEASE ORAL DAILY
Qty: 90 CAPSULE | Refills: 1 | Status: SHIPPED | OUTPATIENT
Start: 2025-07-09 | End: 2026-01-05

## 2025-07-21 DIAGNOSIS — F51.01 PRIMARY INSOMNIA: ICD-10-CM

## 2025-07-21 RX ORDER — ZOLPIDEM TARTRATE 10 MG/1
TABLET ORAL
Qty: 30 TABLET | Refills: 0 | Status: SHIPPED | OUTPATIENT
Start: 2025-07-21 | End: 2025-08-20

## 2025-08-13 ENCOUNTER — OFFICE VISIT (OUTPATIENT)
Age: 55
End: 2025-08-13
Payer: COMMERCIAL

## 2025-08-13 DIAGNOSIS — M54.2 CHRONIC NECK PAIN: ICD-10-CM

## 2025-08-13 DIAGNOSIS — M54.50 CHRONIC BILATERAL LOW BACK PAIN WITHOUT SCIATICA: ICD-10-CM

## 2025-08-13 DIAGNOSIS — G89.29 CHRONIC PAIN OF LEFT KNEE: Primary | ICD-10-CM

## 2025-08-13 DIAGNOSIS — G89.29 CHRONIC NECK PAIN: ICD-10-CM

## 2025-08-13 DIAGNOSIS — G89.29 CHRONIC BILATERAL LOW BACK PAIN WITHOUT SCIATICA: ICD-10-CM

## 2025-08-13 DIAGNOSIS — M25.562 CHRONIC PAIN OF LEFT KNEE: Primary | ICD-10-CM

## 2025-08-13 PROCEDURE — 99204 OFFICE O/P NEW MOD 45 MIN: CPT | Performed by: ANESTHESIOLOGY

## 2025-08-13 RX ORDER — CELECOXIB 100 MG/1
100 CAPSULE ORAL 2 TIMES DAILY
Qty: 60 CAPSULE | Refills: 1 | Status: SHIPPED | OUTPATIENT
Start: 2025-08-13 | End: 2025-10-12

## 2025-08-13 ASSESSMENT — ENCOUNTER SYMPTOMS
BACK PAIN: 1
ABDOMINAL PAIN: 0
SHORTNESS OF BREATH: 0

## (undated) DEVICE — T AND A ORAL: Brand: MEDLINE INDUSTRIES, INC.

## (undated) DEVICE — SYR 5ML 1/5 GRAD LL NSAF LF --

## (undated) DEVICE — 2000CC GUARDIAN II: Brand: GUARDIAN

## (undated) DEVICE — 1200 GUARD II KIT W/5MM TUBE W/O VAC TUBE: Brand: GUARDIAN

## (undated) DEVICE — ELECTROSURGICAL SUCTION COAGULATOR 10FR

## (undated) DEVICE — VINYL URETHRAL CATHETER: Brand: DOVER

## (undated) DEVICE — SOLUTION IV 1000ML 0.9% SOD CHL

## (undated) DEVICE — SOL ANTI-FOG 6ML MEDC -- MEDICHOICE - CONVERT TO 358427

## (undated) DEVICE — INSULATED BLADE ELECTRODE: Brand: EDGE

## (undated) DEVICE — BLOCK BITE AD 60FR W/ VELC STRP ADDRESSES MOST PT AND

## (undated) DEVICE — BUTTON SWITCH PENCIL BLADE ELECTRODE, HOLSTER: Brand: EDGE

## (undated) DEVICE — CONNECTOR TBNG OD5-7MM O2 END DISP

## (undated) DEVICE — REM POLYHESIVE ADULT PATIENT RETURN ELECTRODE: Brand: VALLEYLAB

## (undated) DEVICE — MEDI-VAC YANKAUER SUCTION HANDLE W/BULBOUS TIP: Brand: CARDINAL HEALTH

## (undated) DEVICE — CANNULA NSL ORAL AD FOR CAPNOFLEX CO2 O2 AIRLFE

## (undated) DEVICE — SYR 3ML LL TIP 1/10ML GRAD --

## (undated) DEVICE — CONTAINER PREFIL FRMLN 40ML --

## (undated) DEVICE — SYR 50ML SLIP TIP NSAF LF STRL --

## (undated) DEVICE — KENDALL RADIOLUCENT FOAM MONITORING ELECTRODE RECTANGULAR SHAPE: Brand: KENDALL

## (undated) DEVICE — NDL PRT INJ NSAF BLNT 18GX1.5 --

## (undated) DEVICE — SNARE POLYP SM W13MMXL240CM SHTH DIA2.4MM OVL FLX DISP

## (undated) DEVICE — SPONGE: SPECIALTY TONSIL XR MED 100/CS: Brand: MEDICAL ACTION INDUSTRIES